# Patient Record
Sex: FEMALE | Race: OTHER | HISPANIC OR LATINO | Employment: UNEMPLOYED | URBAN - METROPOLITAN AREA
[De-identification: names, ages, dates, MRNs, and addresses within clinical notes are randomized per-mention and may not be internally consistent; named-entity substitution may affect disease eponyms.]

---

## 2017-03-29 ENCOUNTER — ALLSCRIPTS OFFICE VISIT (OUTPATIENT)
Dept: OTHER | Facility: OTHER | Age: 22
End: 2017-03-29

## 2017-03-29 DIAGNOSIS — N91.2 AMENORRHEA: ICD-10-CM

## 2017-03-29 LAB — HCG, QUALITATIVE (HISTORICAL): NEGATIVE

## 2017-09-06 ENCOUNTER — ALLSCRIPTS OFFICE VISIT (OUTPATIENT)
Dept: OTHER | Facility: OTHER | Age: 22
End: 2017-09-06

## 2017-09-06 DIAGNOSIS — R10.12 LEFT UPPER QUADRANT PAIN: ICD-10-CM

## 2017-09-06 DIAGNOSIS — R42 DIZZINESS AND GIDDINESS: ICD-10-CM

## 2017-09-22 ENCOUNTER — APPOINTMENT (OUTPATIENT)
Dept: LAB | Facility: CLINIC | Age: 22
End: 2017-09-22

## 2017-09-22 DIAGNOSIS — R42 DIZZINESS AND GIDDINESS: ICD-10-CM

## 2017-09-22 DIAGNOSIS — R10.12 LEFT UPPER QUADRANT PAIN: ICD-10-CM

## 2017-09-22 LAB
ALBUMIN SERPL BCP-MCNC: 3.8 G/DL (ref 3.5–5)
ALP SERPL-CCNC: 104 U/L (ref 46–116)
ALT SERPL W P-5'-P-CCNC: 21 U/L (ref 12–78)
ANION GAP SERPL CALCULATED.3IONS-SCNC: 6 MMOL/L (ref 4–13)
AST SERPL W P-5'-P-CCNC: 17 U/L (ref 5–45)
BILIRUB SERPL-MCNC: 0.42 MG/DL (ref 0.2–1)
BUN SERPL-MCNC: 12 MG/DL (ref 5–25)
CALCIUM SERPL-MCNC: 8.9 MG/DL (ref 8.3–10.1)
CHLORIDE SERPL-SCNC: 105 MMOL/L (ref 100–108)
CO2 SERPL-SCNC: 28 MMOL/L (ref 21–32)
CREAT SERPL-MCNC: 0.63 MG/DL (ref 0.6–1.3)
ERYTHROCYTE [DISTWIDTH] IN BLOOD BY AUTOMATED COUNT: 13.7 % (ref 11.6–15.1)
GFR SERPL CREATININE-BSD FRML MDRD: 129 ML/MIN/1.73SQ M
GLUCOSE P FAST SERPL-MCNC: 81 MG/DL (ref 65–99)
HCT VFR BLD AUTO: 37.1 % (ref 34.8–46.1)
HGB BLD-MCNC: 12.8 G/DL (ref 11.5–15.4)
LIPASE SERPL-CCNC: 124 U/L (ref 73–393)
MCH RBC QN AUTO: 28.4 PG (ref 26.8–34.3)
MCHC RBC AUTO-ENTMCNC: 34.5 G/DL (ref 31.4–37.4)
MCV RBC AUTO: 82 FL (ref 82–98)
PLATELET # BLD AUTO: 381 THOUSANDS/UL (ref 149–390)
PMV BLD AUTO: 9.5 FL (ref 8.9–12.7)
POTASSIUM SERPL-SCNC: 4.1 MMOL/L (ref 3.5–5.3)
PROT SERPL-MCNC: 8 G/DL (ref 6.4–8.2)
RBC # BLD AUTO: 4.51 MILLION/UL (ref 3.81–5.12)
SODIUM SERPL-SCNC: 139 MMOL/L (ref 136–145)
WBC # BLD AUTO: 7.87 THOUSAND/UL (ref 4.31–10.16)

## 2017-09-22 PROCEDURE — 36415 COLL VENOUS BLD VENIPUNCTURE: CPT

## 2017-09-22 PROCEDURE — 85027 COMPLETE CBC AUTOMATED: CPT

## 2017-09-22 PROCEDURE — 80053 COMPREHEN METABOLIC PANEL: CPT

## 2017-09-22 PROCEDURE — 83690 ASSAY OF LIPASE: CPT

## 2017-09-25 ENCOUNTER — GENERIC CONVERSION - ENCOUNTER (OUTPATIENT)
Dept: OTHER | Facility: OTHER | Age: 22
End: 2017-09-25

## 2018-01-13 VITALS
DIASTOLIC BLOOD PRESSURE: 62 MMHG | WEIGHT: 108.47 LBS | HEIGHT: 55 IN | HEART RATE: 58 BPM | SYSTOLIC BLOOD PRESSURE: 82 MMHG | TEMPERATURE: 98.2 F | BODY MASS INDEX: 25.1 KG/M2

## 2018-01-13 VITALS
BODY MASS INDEX: 22.05 KG/M2 | SYSTOLIC BLOOD PRESSURE: 104 MMHG | HEIGHT: 61 IN | DIASTOLIC BLOOD PRESSURE: 70 MMHG | WEIGHT: 116.8 LBS

## 2018-01-13 NOTE — MISCELLANEOUS
Reason For Visit  Reason For Visit Free Text Note Form: SW returned call to pt via  ID # [id number] re concern she has over medical cost  SW has explained the [address]   pt is interested in same  SW has referred pt to [facility] our Financial Counselor to f/u and assist pt with same  Active Problems    1  Abdominal pain, acute, left upper quadrant (789 02,338 19) (R10 12)   2  Decreased vision (369 9) (H54 7)   3  Dizziness (780 4) (R42)    Current Meds   1  No Reported Medications Recorded    Allergies    1  No Known Drug Allergies    2  No Known Environmental Allergies   3   No Known Food Allergies    Signatures   Electronically signed by : Madyson Jaeger LCSW; Sep 28 2017  5:26PM EST                       (Author)

## 2018-06-30 ENCOUNTER — APPOINTMENT (EMERGENCY)
Dept: CT IMAGING | Facility: HOSPITAL | Age: 23
End: 2018-06-30

## 2018-06-30 ENCOUNTER — HOSPITAL ENCOUNTER (EMERGENCY)
Facility: HOSPITAL | Age: 23
Discharge: HOME | End: 2018-06-30
Attending: EMERGENCY MEDICINE | Admitting: EMERGENCY MEDICINE

## 2018-06-30 VITALS
DIASTOLIC BLOOD PRESSURE: 81 MMHG | WEIGHT: 108.47 LBS | SYSTOLIC BLOOD PRESSURE: 136 MMHG | BODY MASS INDEX: 25.21 KG/M2 | TEMPERATURE: 98.9 F | RESPIRATION RATE: 18 BRPM | HEART RATE: 59 BPM | OXYGEN SATURATION: 97 %

## 2018-06-30 DIAGNOSIS — N94.6 DYSMENORRHEA: Primary | ICD-10-CM

## 2018-06-30 LAB
ALBUMIN SERPL BCP-MCNC: 4.1 G/DL (ref 3.5–5)
ALP SERPL-CCNC: 93 U/L (ref 46–116)
ALT SERPL W P-5'-P-CCNC: 24 U/L (ref 12–78)
ANION GAP SERPL CALCULATED.3IONS-SCNC: 9 MMOL/L (ref 4–13)
AST SERPL W P-5'-P-CCNC: 18 U/L (ref 5–45)
BACTERIA UR QL AUTO: ABNORMAL /HPF
BASOPHILS # BLD AUTO: 0.08 THOUSANDS/ΜL (ref 0–0.1)
BASOPHILS NFR BLD AUTO: 1 % (ref 0–1)
BILIRUB SERPL-MCNC: 0.4 MG/DL (ref 0.2–1)
BILIRUB UR QL STRIP: NEGATIVE
BUN SERPL-MCNC: 13 MG/DL (ref 5–25)
CALCIUM SERPL-MCNC: 9 MG/DL (ref 8.3–10.1)
CHLORIDE SERPL-SCNC: 101 MMOL/L (ref 100–108)
CLARITY UR: CLEAR
CO2 SERPL-SCNC: 28 MMOL/L (ref 21–32)
COLOR UR: YELLOW
CREAT SERPL-MCNC: 0.74 MG/DL (ref 0.6–1.3)
EOSINOPHIL # BLD AUTO: 0.12 THOUSAND/ΜL (ref 0–0.61)
EOSINOPHIL NFR BLD AUTO: 1 % (ref 0–6)
ERYTHROCYTE [DISTWIDTH] IN BLOOD BY AUTOMATED COUNT: 13 % (ref 11.6–15.1)
EXT PREG TEST URINE: NEGATIVE
GFR SERPL CREATININE-BSD FRML MDRD: 115 ML/MIN/1.73SQ M
GLUCOSE SERPL-MCNC: 88 MG/DL (ref 65–140)
GLUCOSE UR STRIP-MCNC: NEGATIVE MG/DL
HCT VFR BLD AUTO: 38.2 % (ref 34.8–46.1)
HGB BLD-MCNC: 13 G/DL (ref 11.5–15.4)
HGB UR QL STRIP.AUTO: NEGATIVE
KETONES UR STRIP-MCNC: ABNORMAL MG/DL
LEUKOCYTE ESTERASE UR QL STRIP: ABNORMAL
LIPASE SERPL-CCNC: 76 U/L (ref 73–393)
LYMPHOCYTES # BLD AUTO: 4.51 THOUSANDS/ΜL (ref 0.6–4.47)
LYMPHOCYTES NFR BLD AUTO: 38 % (ref 14–44)
MCH RBC QN AUTO: 28.4 PG (ref 26.8–34.3)
MCHC RBC AUTO-ENTMCNC: 34 G/DL (ref 31.4–37.4)
MCV RBC AUTO: 83 FL (ref 82–98)
MONOCYTES # BLD AUTO: 0.61 THOUSAND/ΜL (ref 0.17–1.22)
MONOCYTES NFR BLD AUTO: 5 % (ref 4–12)
MUCOUS THREADS UR QL AUTO: ABNORMAL
NEUTROPHILS # BLD AUTO: 6.44 THOUSANDS/ΜL (ref 1.85–7.62)
NEUTS SEG NFR BLD AUTO: 55 % (ref 43–75)
NITRITE UR QL STRIP: NEGATIVE
NON-SQ EPI CELLS URNS QL MICRO: ABNORMAL /HPF
PH UR STRIP.AUTO: 6 [PH] (ref 4.5–8)
PLATELET # BLD AUTO: 360 THOUSANDS/UL (ref 149–390)
PMV BLD AUTO: 8.8 FL (ref 8.9–12.7)
POTASSIUM SERPL-SCNC: 3.6 MMOL/L (ref 3.5–5.3)
PROT SERPL-MCNC: 8.4 G/DL (ref 6.4–8.2)
PROT UR STRIP-MCNC: ABNORMAL MG/DL
RBC # BLD AUTO: 4.58 MILLION/UL (ref 3.81–5.12)
RBC #/AREA URNS AUTO: ABNORMAL /HPF
SODIUM SERPL-SCNC: 138 MMOL/L (ref 136–145)
SP GR UR STRIP.AUTO: >=1.03 (ref 1–1.03)
UROBILINOGEN UR QL STRIP.AUTO: 0.2 E.U./DL
WBC # BLD AUTO: 11.76 THOUSAND/UL (ref 4.31–10.16)
WBC #/AREA URNS AUTO: ABNORMAL /HPF

## 2018-06-30 PROCEDURE — 83690 ASSAY OF LIPASE: CPT | Performed by: PHYSICIAN ASSISTANT

## 2018-06-30 PROCEDURE — 81001 URINALYSIS AUTO W/SCOPE: CPT

## 2018-06-30 PROCEDURE — 85025 COMPLETE CBC W/AUTO DIFF WBC: CPT | Performed by: PHYSICIAN ASSISTANT

## 2018-06-30 PROCEDURE — 36415 COLL VENOUS BLD VENIPUNCTURE: CPT | Performed by: PHYSICIAN ASSISTANT

## 2018-06-30 PROCEDURE — 99284 EMERGENCY DEPT VISIT MOD MDM: CPT

## 2018-06-30 PROCEDURE — 80053 COMPREHEN METABOLIC PANEL: CPT | Performed by: PHYSICIAN ASSISTANT

## 2018-06-30 PROCEDURE — 74177 CT ABD & PELVIS W/CONTRAST: CPT

## 2018-06-30 PROCEDURE — 81025 URINE PREGNANCY TEST: CPT | Performed by: PHYSICIAN ASSISTANT

## 2018-06-30 PROCEDURE — 96374 THER/PROPH/DIAG INJ IV PUSH: CPT

## 2018-06-30 RX ORDER — ONDANSETRON 4 MG/1
4 TABLET, ORALLY DISINTEGRATING ORAL EVERY 8 HOURS PRN
Qty: 15 TABLET | Refills: 0 | Status: SHIPPED | OUTPATIENT
Start: 2018-06-30 | End: 2018-10-12 | Stop reason: ALTCHOICE

## 2018-06-30 RX ORDER — IBUPROFEN 600 MG/1
600 TABLET ORAL EVERY 6 HOURS PRN
Qty: 30 TABLET | Refills: 0 | Status: SHIPPED | OUTPATIENT
Start: 2018-06-30 | End: 2018-10-12

## 2018-06-30 RX ORDER — KETOROLAC TROMETHAMINE 30 MG/ML
15 INJECTION, SOLUTION INTRAMUSCULAR; INTRAVENOUS ONCE
Status: COMPLETED | OUTPATIENT
Start: 2018-06-30 | End: 2018-06-30

## 2018-06-30 RX ADMIN — KETOROLAC TROMETHAMINE 15 MG: 30 INJECTION, SOLUTION INTRAMUSCULAR at 20:48

## 2018-06-30 RX ADMIN — IOHEXOL 100 ML: 350 INJECTION, SOLUTION INTRAVENOUS at 21:40

## 2018-07-01 NOTE — ED NOTES
Nursing student assessments and treatments reviewed and approved by myself       Marichuy Valera RN  07/01/18 5347

## 2018-07-01 NOTE — DISCHARGE INSTRUCTIONS
Dismenorrea   LO QUE NECESITA SABER:   Kristin Livers son cólicos menstruales dolorosos que ocurren al momento de marshall período menstrual o alrededor de éste  INSTRUCCIONES SOBRE EL [facility]:   Medicamentos:  Es posible que usted necesite alguno de los siguientes:  · AINEs (Analgésicos antiinflamatorios no esteroides)  ayudan a disminuir la inflamación, el dolor y la fiebre  Janet medicamento esta disponible con o sin arnold receta médica  Los AINEs pueden causar sangrado estomacal o problemas renales en ciertas personas  Si usted tomas un medicamento anticoagulante, siempre pregúntele a marshall médico si los MARELY son seguros para usted  Siempre dulce la etiqueta de janet medicamento y [place] instrucciones  · Los medicamentos anticonceptivos  podrían ayudar a disminuir marshall dolor  Estos medicamentos podrían ser píldoras anticonceptivas o un dispositivo intrauterino (DIU) que no sea de cobre  · [place] cyrus medicamentos aristeo se le haya indicado  Consulte con marshall médico si usted janet que marshall medicamento no le está ayudando o si presenta efectos secundarios  Infórmele si es alérgico a cualquier medicamento  Mantenga arnold lista actualizada de los [place], las vitaminas y los productos herbales que tomas  Incluya los siguientes datos de los medicamentos: cantidad, frecuencia y motivo de administración  Traiga con usted la lista o los envases de la píldoras a cyrus citas de seguimiento  Lleve la lista de los medicamentos con usted en flakita de arnold emergencia  Consuma alimentos bajos en grasa:  Aumente la cantidad de vegetales y semillas crudas que consume  Pregunte a marshall médico si usted debería teri vitamina B o suplementos de magnesio  Estos le ayudarán a disminuir marshall dolor  No consuma productos lácteos o huevos  Aplique calor:  Aplique calor sobre la parte inferior de marshall abdomen por 20 a 30 minutos cada 2 horas por los [employer] indiquen  El calor ayuda a disminuir el dolor y los espasmos musculares    Controle marshall estrés: El estrés puede empeorar colette síntomas  Intente realizar ejercicios de relajamiento, aristeo respiración profunda  Gemini Ar[place] regularmente:  Pregunte a hurt médico acerca del mejor plan de ejercicio para usted  El ejercicio puede disminuir el dolor  Mantenga un registro de hurt dolor:  Escriba cuándo comienzan y terminan el dolor y los períodos [place]  Sukumar Papa registro con usted a colette citas de seguimiento  No fume:  Evite estar con otras personas que fumen  Si usted fuma, nunca es demasiado tarde para dejar de hacerlo  El fumar puede aumentar hurt riesgo de dismenorrea  Solicite información a hurt médico si usted necesita ayuda para dejar de fumar  Programe arnold tiarra con hurt médico o ginecólogo aristeo se le indique:  Anote colette preguntas para que se acuerde de hacerlas delores colette visitas  Comuníquese con hurt médico o tocoginecólogo si:   · Usted tiene ansiedad o se siente deprimido  · Colette períodos son antes o después de tiempo o más dolorosos que de costumbre  · Usted tiene preguntas o inquietudes acerca de hurt condición o cuidado  Regrese a la kiah de emergencias si:   · Usted tiene dolor intenso  · Usted tiene sangrado vaginal abundante y siente que se desmaya  · Usted tiene dolor en el pecho o dificultad para respirar de manera repentina  © 2017 [address] Information is for End User's use only and may not be sold, redistributed or otherwise used for commercial purposes  All illustrations and images included in CareNotes® are the copyrighted property of A D A M , Inc  or Rosas Stover  Esta información es sólo para uso en educación  Hurt intención no es darle un consejo médico sobre enfermedades o tratamientos  Colsulte con hurt Parshall Ken[place] farmacéutico antes de seguir cualquier régimen médico para saber si es seguro y efectivo para usted

## 2018-07-01 NOTE — ED PROVIDER NOTES
History  Chief Complaint   Patient presents with    Abdominal Pain     intermittent abdominal pain x 6 months that has become more frequents  reports it as lower mid abdominal pain  also reports HA and dizziness  [facility]  80-year-old female presents to the emergency department with complaints of lower abdominal cramping pain  States she has had symptoms over the past 2 days  Notes that she has had intermittent symptoms over the past several months as well  States that symptoms seem to be worse when she gets her menstrual cycle  She started the mentions cycle approximately 2 days ago  No fevers or chills  Does complain of some nausea vomiting  No diarrhea  States the bleeding has not been any heavier than usual         History provided by:  Patient and friend   used: No    Abdominal Pain   Pain location:  Suprapubic, RLQ and LLQ  Pain quality: cramping    Pain radiates to:  Does not radiate  Pain severity:  Moderate  Onset quality:  Gradual  Duration:  2 days  Timing:  Intermittent  Progression:  Waxing and waning  Chronicity:  New  Relieved by:  Nothing  Ineffective treatments:  None tried  Associated symptoms: nausea, vaginal bleeding and vomiting    Associated symptoms: no chest pain, no chills, no cough, no dysuria, no fever, no hematuria, no shortness of breath and no sore throat        None       History reviewed  No pertinent past medical history  History reviewed  No pertinent surgical history  History reviewed  No pertinent family history  I have reviewed and agree with the history as documented  Social History   Substance Use Topics    Smoking status: Never Smoker    Smokeless tobacco: Never Used    Alcohol use No        Review of Systems   Constitutional: Negative for activity change, chills and fever  HENT: Negative for congestion, ear pain and sore throat  Eyes: Negative for pain     Respiratory: Negative for cough, chest tightness, shortness of breath and wheezing  Cardiovascular: Negative for chest pain  Gastrointestinal: Positive for abdominal pain, nausea and vomiting  Endocrine: Negative for cold intolerance and heat intolerance  Genitourinary: Positive for vaginal bleeding  Negative for difficulty urinating, dysuria, flank pain, hematuria and urgency  Musculoskeletal: Negative for back pain and myalgias  Skin: Negative for color change and rash  Allergic/Immunologic: Negative for food allergies  Neurological: Negative for dizziness, syncope, weakness, numbness and headaches  Psychiatric/Behavioral: Negative for agitation and behavioral problems  All other systems reviewed and are negative  Physical Exam  Physical Exam   Constitutional: She is oriented to person, place, and time  She appears well-developed and well-nourished  No distress  HENT:   Head: Normocephalic and atraumatic  Right Ear: External ear normal    Left Ear: External ear normal    Mouth/Throat: Oropharynx is clear and moist  No oropharyngeal exudate  Eyes: Conjunctivae are normal    Neck: No JVD present  No tracheal deviation present  Cardiovascular: Normal rate, regular rhythm and normal heart sounds  Exam reveals no gallop and no friction rub  No murmur heard  Pulmonary/Chest: Effort normal and breath sounds normal  No respiratory distress  She has no wheezes  She has no rales  She exhibits no tenderness  Abdominal: Soft  Bowel sounds are normal  She exhibits no distension  There is tenderness in the right lower quadrant, suprapubic area and left lower quadrant  There is no guarding and no CVA tenderness  Musculoskeletal: Normal range of motion  She exhibits no edema, tenderness or deformity  Lymphadenopathy:     She has no cervical adenopathy  Neurological: She is alert and oriented to person, place, and time  Skin: Skin is warm and dry  No rash noted  She is not diaphoretic  No erythema     Psychiatric: She has a normal mood and affect  Her behavior is normal    Nursing note and vitals reviewed        Vital Signs  ED Triage Vitals [06/30/18 1921]   Temperature Pulse Respirations Blood Pressure SpO2   98 9 °F (37 2 °C) 59 18 136/81 97 %      Temp Source Heart Rate Source Patient Position - Orthostatic VS BP Location FiO2 (%)   Oral Monitor Sitting Right arm --      Pain Score       Worst Possible Pain           Vitals:    06/30/18 1921   BP: 136/81   Pulse: 59   Patient Position - Orthostatic VS: Sitting       Visual Acuity      ED Medications  Medications   ketorolac (TORADOL) injection 15 mg (15 mg Intravenous Given 6/30/18 2048)   iohexol (OMNIPAQUE) 350 MG/ML injection (MULTI-DOSE) 100 mL (100 mL Intravenous Given 6/30/18 2140)       Diagnostic Studies  Results Reviewed     Procedure Component Value Units Date/Time    Urine Microscopic [[id number]]  (Abnormal) Collected:  06/30/18 2038    Lab Status:  Final result Specimen:  Urine Updated:  06/30/18 2219     RBC, UA 0-1 (A) /hpf      WBC, UA 1-2 (A) /hpf      Epithelial Cells Occasional /hpf      Bacteria, UA Occasional /hpf      MUCOUS THREADS Moderate (A)    Lipase [[id number]]  (Normal) Collected:  06/30/18 2029    Lab Status:  Final result Specimen:  Blood from Arm, Right Updated:  06/30/18 2104     Lipase 76 u/L     Comprehensive metabolic panel [[id number]]  (Abnormal) Collected:  06/30/18 2029    Lab Status:  Final result Specimen:  Blood from Arm, Right Updated:  06/30/18 2054     Sodium 138 mmol/L      Potassium 3 6 mmol/L      Chloride 101 mmol/L      CO2 28 mmol/L      Anion Gap 9 mmol/L      BUN 13 mg/dL      Creatinine 0 74 mg/dL      Glucose 88 mg/dL      Calcium 9 0 mg/dL      AST 18 U/L      ALT 24 U/L      Alkaline Phosphatase 93 U/L      Total Protein 8 4 (H) g/dL      Albumin 4 1 g/dL      Total Bilirubin 0 40 mg/dL      eGFR 115 ml/min/1 73sq m     Narrative:         National Kidney Disease Education Program recommendations are as follows:  GFR calculation is accurate only with a steady state creatinine  Chronic Kidney disease less than 60 ml/min/1 73 sq  meters  Kidney failure less than 15 ml/min/1 73 sq  meters      CBC and differential [[id number]]  (Abnormal) Collected:  06/30/18 2029    Lab Status:  Final result Specimen:  Blood from Arm, Right Updated:  06/30/18 2037     WBC 11 76 (H) Thousand/uL      RBC 4 58 Million/uL      Hemoglobin 13 0 g/dL      Hematocrit 38 2 %      MCV 83 fL      MCH 28 4 pg      MCHC 34 0 g/dL      RDW 13 0 %      MPV 8 8 (L) fL      Platelets 314 Thousands/uL      Neutrophils Relative 55 %      Lymphocytes Relative 38 %      Monocytes Relative 5 %      Eosinophils Relative 1 %      Basophils Relative 1 %      Neutrophils Absolute 6 44 Thousands/µL      Lymphocytes Absolute 4 51 (H) Thousands/µL      Monocytes Absolute 0 61 Thousand/µL      Eosinophils Absolute 0 12 Thousand/µL      Basophils Absolute 0 08 Thousands/µL     POCT pregnancy, urine [[id number]]  (Normal) Resulted:  06/30/18 2032    Lab Status:  Final result Updated:  06/30/18 2032     EXT PREG TEST UR (Ref: Negative) negative    ED Urine Macroscopic [[id number]]  (Abnormal) Collected:  06/30/18 2038    Lab Status:  Final result Specimen:  Urine Updated:  06/30/18 2032     Color, UA Yellow     Clarity, UA Clear     pH, UA 6 0     Leukocytes, UA Trace (A)     Nitrite, UA Negative     Protein, UA Trace (A) mg/dl      Glucose, UA Negative mg/dl      Ketones, UA 15 (1+) (A) mg/dl      Urobilinogen, UA 0 2 E U /dl      Bilirubin, UA Negative     Blood, UA Negative     Specific Gravity, UA >=1 030    Narrative:       CLINITEK RESULT                 CT abdomen pelvis with contrast   Final Result by Darwin Truong MD (06/30 2202)         Unremarkable CT of the abdomen and pelvis with IV without oral contrast          Workstation performed: [id number]                    Procedures  Procedures       Phone Contacts  ED Phone Contact    ED Course                               MDM  Number of Diagnoses or Management Options  Dysmenorrhea:   Diagnosis management comments: Differential diagnosis includes but not limited to:  Dysmenorrhea, ovarian cyst, fibroid  Amount and/or Complexity of Data Reviewed  Clinical lab tests: ordered and reviewed  Tests in the radiology section of CPT®: ordered and reviewed      CritCare Time    Disposition  Final diagnoses:   Dysmenorrhea     Time reflects when diagnosis was documented in both MDM as applicable and the Disposition within this note     Time User Action Codes Description Comment    6/30/2018 10:19 PM Chris Davidson Add [N94 6] Dysmenorrhea       ED Disposition     ED Disposition Condition Comment    Discharge  [facility] discharge to home/self care  Condition at discharge: Stable        Follow-up Information     Follow up With Specialties Details Why [id number] Faria martina, DO Internal Medicine Schedule an appointment as soon as possible for a visit  [address]ina  [phone]            Discharge Medication List as of 6/30/2018 10:20 PM      START taking these medications    Details   ibuprofen (MOTRIN) 600 mg tablet Take 1 tablet (600 mg total) by mouth every 6 (six) hours as needed for mild pain, Starting Sat 6/30/2018, Normal      ondansetron (ZOFRAN ODT) 4 mg disintegrating tablet Take 1 tablet (4 mg total) by mouth every 8 (eight) hours as needed for nausea or vomiting for up to 15 doses, Starting Sat 6/30/2018, Normal           No discharge procedures on file      ED Provider  Electronically Signed by           Daylin Parker PA-C  07/01/18 6725

## 2018-07-01 NOTE — ED NOTES
Nursing student assessments and treatments reviewed and approved by myself        Mendel Cox, RN  06/30/18 2035

## 2018-10-12 ENCOUNTER — OFFICE VISIT (OUTPATIENT)
Dept: OBGYN CLINIC | Facility: CLINIC | Age: 23
End: 2018-10-12

## 2018-10-12 VITALS
BODY MASS INDEX: 25.33 KG/M2 | HEART RATE: 69 BPM | DIASTOLIC BLOOD PRESSURE: 72 MMHG | WEIGHT: 112.6 LBS | SYSTOLIC BLOOD PRESSURE: 111 MMHG | HEIGHT: 56 IN

## 2018-10-12 DIAGNOSIS — Z01.419 WOMEN'S ANNUAL ROUTINE GYNECOLOGICAL EXAMINATION: ICD-10-CM

## 2018-10-12 DIAGNOSIS — N91.2 AMENORRHEA: Primary | ICD-10-CM

## 2018-10-12 LAB — SL AMB POCT URINE HCG: POSITIVE

## 2018-10-12 PROCEDURE — 81025 URINE PREGNANCY TEST: CPT | Performed by: OBSTETRICS & GYNECOLOGY

## 2018-10-12 PROCEDURE — 99213 OFFICE O/P EST LOW 20 MIN: CPT | Performed by: OBSTETRICS & GYNECOLOGY

## 2018-10-12 NOTE — PROGRESS NOTES
Assessment/Plan:  Fantasma Walls is a 21 y o  female , here for evaluation of amenorrhea  LMP Late 2018, POCT urine preg test positive in office today  Viability U/S showed intrauterine sac ~1 5cm, fetus not visualized  Return to clinic in two weeks for:   -Repeat TVUS  -PAP   -GC/Chlamydia  -Flu      E[place]ian interpretor Veronica Dill: [id number]   Subjective   Fantasma Walls is a 21 y o  female  (has one daughter in [place] born via  at full term, no complications)  She is here for evaluation of amenorrhea  Last MP was "2018  Patient sexually active with boyfriend and does not use contraception  She denies any history of STD's  Is asymptomatic now, denies fevers, chills, vaginal discharge, vaginal bleeding, abdominal/pelvic pain  Does report occasional nausea, but able to tolerate PO well  Patient Active Problem List   Diagnosis    Amenorrhea     Gynecologic History  LMP "2018  Contraception: None    Past Medical History:   Diagnosis Date    Patient denies medical problems      Past Surgical History:   Procedure Laterality Date    NO PAST SURGERIES       Family History   Problem Relation Age of Onset    No Known Problems Mother     No Known Problems Father     No Known Problems Brother     No Known Problems Daughter     No Known Problems Brother     No Known Problems Brother      Social History     Social History    Marital status: /Civil Union     Spouse name: N/A    Number of children: N/A    Years of education: N/A     Occupational History    Not on file       Social History Main Topics    Smoking status: Never Smoker    Smokeless tobacco: Never Used    Alcohol use Yes      Comment: socially    Drug use: No    Sexual activity: Yes     Partners: Male     Birth control/ protection: None     Other Topics Concern    Not on file     Social History Narrative    No narrative on file     Patient has no known allergies  No current outpatient prescriptions on file  Review of Systems  See HPI  Denies fevers/chills, nausea, vomiting, abnormal vaginal bleeding, dysuria, urinary frequency, discharge, pelvic pain, diarrhea  Objective   /72   Pulse 69   Ht 4' 8" (1 422 m)   Wt 51 1 kg (112 lb 9 6 oz)   LMP 06/01/2018   Breastfeeding? No   BMI 25 24 kg/m²   GEN: The patient was alert and oriented x3, pleasant well-appearing female in no acute distress  RESP:  Normal respirations  ABD:  Soft, nontender, non-distended  PELVIC:  Normal appearing external female genitalia  See TVUS findings above

## 2018-10-17 ENCOUNTER — PATIENT OUTREACH (OUTPATIENT)
Dept: OBGYN CLINIC | Facility: HOSPITAL | Age: 23
End: 2018-10-17

## 2018-10-17 DIAGNOSIS — Z78.9 NEED FOR FOLLOW-UP BY SOCIAL WORKER: Primary | ICD-10-CM

## 2018-10-17 NOTE — PROGRESS NOTES
PC to [facility] from [facility]  Apparently pt had called Tarun Yo, Financial Counselor to ask about termination of pregnancy and to report that she was having abdominal pain  Bird He, unable to reach [facility] and not aware that pt lives in [place], called Aric Ivan for assistance  Pt with c/o severe abdominal pain  Asking for information about termination of pregnancy  SW questioned stage of pregnancy and whether pt had scheduled [facility] appointment  Pt is Guamanian-speaking only  Onelia called pt back and provided [facility] with requested information  [facility] initially told Onelia to have pt go to ED and told her that referral to [facility] for TOP at this point may be ill-advised  [facility] again recommended that Onelia  (or Bird He) advise pt to go to ED  [facility] phoned Aric Ivan again -- she had told Bird He to have pt go to ED if pain is severe  She then told me that Bird He had provided pt with name of [facility] for TOP  Pt told Bird He that she didn't think she could get to ED today and "might" go tomorrow  Pt does have appointment with [facility] in [place] next week and hopefully can be followed at that time

## 2019-03-26 ENCOUNTER — HOSPITAL ENCOUNTER (EMERGENCY)
Facility: HOSPITAL | Age: 24
Discharge: HOME | End: 2019-03-26
Attending: EMERGENCY MEDICINE | Admitting: EMERGENCY MEDICINE

## 2019-03-26 VITALS
HEART RATE: 65 BPM | SYSTOLIC BLOOD PRESSURE: 119 MMHG | BODY MASS INDEX: 28.07 KG/M2 | WEIGHT: 125.22 LBS | OXYGEN SATURATION: 99 % | RESPIRATION RATE: 18 BRPM | DIASTOLIC BLOOD PRESSURE: 82 MMHG | TEMPERATURE: 98.4 F

## 2019-03-26 DIAGNOSIS — A60.00 GENITAL HERPES: ICD-10-CM

## 2019-03-26 DIAGNOSIS — N39.0 UTI (URINARY TRACT INFECTION): Primary | ICD-10-CM

## 2019-03-26 LAB
AMORPH PHOS CRY URNS QL MICRO: ABNORMAL /HPF
BACTERIA UR QL AUTO: ABNORMAL /HPF
BILIRUB UR QL STRIP: NEGATIVE
CLARITY UR: ABNORMAL
COLOR UR: YELLOW
EXT PREG TEST URINE: NEGATIVE
GLUCOSE UR STRIP-MCNC: NEGATIVE MG/DL
HGB UR QL STRIP.AUTO: ABNORMAL
KETONES UR STRIP-MCNC: NEGATIVE MG/DL
LEUKOCYTE ESTERASE UR QL STRIP: ABNORMAL
NITRITE UR QL STRIP: NEGATIVE
NON-SQ EPI CELLS URNS QL MICRO: ABNORMAL /HPF
PH UR STRIP.AUTO: 7.5 [PH]
PROT UR STRIP-MCNC: NEGATIVE MG/DL
RBC #/AREA URNS AUTO: ABNORMAL /HPF
SP GR UR STRIP.AUTO: 1.01 (ref 1–1.03)
UROBILINOGEN UR QL STRIP.AUTO: 0.2 E.U./DL
WBC #/AREA URNS AUTO: ABNORMAL /HPF

## 2019-03-26 PROCEDURE — 81001 URINALYSIS AUTO W/SCOPE: CPT | Performed by: EMERGENCY MEDICINE

## 2019-03-26 PROCEDURE — 96372 THER/PROPH/DIAG INJ SC/IM: CPT

## 2019-03-26 PROCEDURE — 99283 EMERGENCY DEPT VISIT LOW MDM: CPT

## 2019-03-26 PROCEDURE — 81025 URINE PREGNANCY TEST: CPT | Performed by: EMERGENCY MEDICINE

## 2019-03-26 PROCEDURE — 87491 CHLMYD TRACH DNA AMP PROBE: CPT | Performed by: EMERGENCY MEDICINE

## 2019-03-26 PROCEDURE — 87591 N.GONORRHOEAE DNA AMP PROB: CPT | Performed by: EMERGENCY MEDICINE

## 2019-03-26 RX ORDER — METRONIDAZOLE 500 MG/1
2000 TABLET ORAL ONCE
Status: COMPLETED | OUTPATIENT
Start: 2019-03-26 | End: 2019-03-26

## 2019-03-26 RX ORDER — AZITHROMYCIN 250 MG/1
1000 TABLET, FILM COATED ORAL ONCE
Status: COMPLETED | OUTPATIENT
Start: 2019-03-26 | End: 2019-03-26

## 2019-03-26 RX ORDER — ACYCLOVIR 400 MG/1
400 TABLET ORAL 3 TIMES DAILY
Qty: 30 TABLET | Refills: 0 | Status: SHIPPED | OUTPATIENT
Start: 2019-03-26 | End: 2021-07-22 | Stop reason: ALTCHOICE

## 2019-03-26 RX ORDER — ACYCLOVIR 200 MG/1
400 CAPSULE ORAL ONCE
Status: COMPLETED | OUTPATIENT
Start: 2019-03-26 | End: 2019-03-26

## 2019-03-26 RX ORDER — CEPHALEXIN 250 MG/1
500 CAPSULE ORAL ONCE
Status: COMPLETED | OUTPATIENT
Start: 2019-03-26 | End: 2019-03-26

## 2019-03-26 RX ORDER — CEPHALEXIN 250 MG/1
500 CAPSULE ORAL 2 TIMES DAILY
Qty: 28 CAPSULE | Refills: 0 | Status: SHIPPED | OUTPATIENT
Start: 2019-03-26 | End: 2019-04-02

## 2019-03-26 RX ADMIN — METRONIDAZOLE 2000 MG: 500 TABLET, FILM COATED ORAL at 22:02

## 2019-03-26 RX ADMIN — ACYCLOVIR 400 MG: 200 CAPSULE ORAL at 22:03

## 2019-03-26 RX ADMIN — AZITHROMYCIN 1000 MG: 250 TABLET, FILM COATED ORAL at 22:03

## 2019-03-26 RX ADMIN — CEPHALEXIN 500 MG: 250 CAPSULE ORAL at 23:01

## 2019-03-26 RX ADMIN — LIDOCAINE HYDROCHLORIDE 250 MG: 10 INJECTION, SOLUTION EPIDURAL; INFILTRATION; INTRACAUDAL; PERINEURAL at 22:04

## 2019-03-27 LAB
C TRACH DNA SPEC QL NAA+PROBE: NEGATIVE
N GONORRHOEA DNA SPEC QL NAA+PROBE: NEGATIVE

## 2019-03-27 NOTE — ED PROVIDER NOTES
History  Chief Complaint   Patient presents with    Vaginal Pain     pt c o vaginal pain, discharge, and burning with urination  HPI     77-year-old previously healthy female presenting for evaluation of vaginal pain, white vaginal discharge, and burning with urination for the last 5 days  Also endorses some mild suprapubic abdominal pain  States that she noticed a rash on her labia 3 days ago  She has never had anything like this before  No prior history of STIs  She is sexually active with her  only, intermittently uses protection  She has had 1 prior pregnancy that ended in miscarriage last year  Last menstrual period was 1 week ago, described as normal   No history of prior gyn conditions  Denies nausea, vomiting, abdominal pain, fevers, or chills  No blood in her urine  None       Past Medical History:   Diagnosis Date    Patient denies medical problems        Past Surgical History:   Procedure Laterality Date    NO PAST SURGERIES         Family History   Problem Relation Age of Onset    No Known Problems Mother     No Known Problems Father     No Known Problems Brother     No Known Problems Daughter     No Known Problems Brother     No Known Problems Brother      I have reviewed and agree with the history as documented  Social History     Tobacco Use    Smoking status: Never Smoker    Smokeless tobacco: Never Used   Substance Use Topics    Alcohol use: Yes     Comment: socially    Drug use: No        Review of Systems   Constitutional: Negative for chills and fever  HENT: Negative for congestion  Eyes: Negative for visual disturbance  Respiratory: Negative for cough and shortness of breath  Cardiovascular: Negative for chest pain and leg swelling  Gastrointestinal: Positive for abdominal pain (suprapubic)  Negative for constipation, diarrhea, nausea and vomiting  Genitourinary: Positive for dysuria, vaginal discharge and vaginal pain   Negative for difficulty urinating, frequency and menstrual problem  Musculoskeletal: Negative for arthralgias, back pain, neck pain and neck stiffness  Skin: Negative for rash  Neurological: Negative for weakness, numbness and headaches  Psychiatric/Behavioral: Negative for agitation, behavioral problems and confusion  Physical Exam  Physical Exam   Constitutional: She is oriented to person, place, and time  She appears well-developed and well-nourished  No distress  HENT:   Head: Normocephalic and atraumatic  Right Ear: External ear normal    Left Ear: External ear normal    Nose: Nose normal    Mouth/Throat: Oropharynx is clear and moist    Eyes: Conjunctivae are normal    Neck: Normal range of motion  Neck supple  Cardiovascular: Normal rate, regular rhythm and normal heart sounds  Exam reveals no gallop and no friction rub  No murmur heard  Pulmonary/Chest: Effort normal and breath sounds normal  No respiratory distress  She has no wheezes  She has no rales  Abdominal: Soft  Bowel sounds are normal  She exhibits no distension  There is no tenderness  There is no guarding  Genitourinary: Uterus normal  There is rash (vesicular herpetic lesions) on the right labia  There is rash (vesicular herpetic lesions) on the left labia  Uterus is not enlarged and not tender  Cervix exhibits no motion tenderness, no discharge and no friability  Right adnexum displays no mass, no tenderness and no fullness  Left adnexum displays no mass, no tenderness and no fullness  There is bleeding (scant dark red blood) in the vagina  Musculoskeletal: Normal range of motion  She exhibits no edema or deformity  Neurological: She is alert and oriented to person, place, and time  She exhibits normal muscle tone  Skin: Skin is warm and dry  She is not diaphoretic         Vital Signs  ED Triage Vitals   Temperature Pulse Respirations Blood Pressure SpO2   03/26/19 2029 03/26/19 2029 03/26/19 2029 03/26/19 2029 03/26/19 2029 98 4 °F (36 9 °C) 58 20 133/78 98 %      Temp Source Heart Rate Source Patient Position - Orthostatic VS BP Location FiO2 (%)   03/26/19 2029 03/26/19 2029 03/26/19 2302 03/26/19 2302 --   Oral Monitor Sitting Right arm       Pain Score       03/26/19 2029       Worst Possible Pain           Vitals:    03/26/19 2029 03/26/19 2302   BP: 133/78 119/82   Pulse: 58 65   Patient Position - Orthostatic VS:  Sitting         Visual Acuity      ED Medications  Medications   metroNIDAZOLE (FLAGYL) tablet 2,000 mg (2,000 mg Oral Given 3/26/19 2202)   cefTRIAXone (ROCEPHIN) 250 mg in lidocaine (PF) (XYLOCAINE-MPF) 1 % IM only syringe (250 mg Intramuscular Given 3/26/19 2204)   azithromycin (ZITHROMAX) tablet 1,000 mg (1,000 mg Oral Given 3/26/19 2203)   acyclovir (ZOVIRAX) capsule 400 mg (400 mg Oral Given 3/26/19 2203)   cephalexin (KEFLEX) capsule 500 mg (500 mg Oral Given 3/26/19 2301)       Diagnostic Studies  Results Reviewed     Procedure Component Value Units Date/Time    Urinalysis with microscopic [[id number]]  (Abnormal) Collected:  03/26/19 2148    Lab Status:  Final result Specimen:  Urine, Other Updated:  03/26/19 2219     Clarity, UA Cloudy     Color, UA Yellow     Specific Gravity, UA 1 015     pH, UA 7 5     Glucose, UA Negative mg/dl      Ketones, UA Negative mg/dl      Blood, UA Moderate     Protein, UA Negative mg/dl      Nitrite, UA Negative     Bilirubin, UA Negative     Urobilinogen, UA 0 2 E U /dl      Leukocytes, UA Small     WBC, UA 4-10 /hpf      RBC, UA 4-10 /hpf      Bacteria, UA Innumerable /hpf      AMORPH PHOSPATES Moderate /hpf      Epithelial Cells Occasional /hpf     POCT pregnancy, urine [[id number]]  (Normal) Resulted:  03/26/19 2147    Lab Status:  Final result Updated:  03/26/19 2200     EXT PREG TEST UR (Ref: Negative) Negative    Chlamydia/GC amplified DNA by PCR [[id number]] Collected:  03/26/19 2148    Lab Status:   In process Specimen:  Urine, Other Updated:  03/26/19 2152 No orders to display              Procedures  Procedures       Phone Contacts  ED Phone Contact    ED Course                               MDM  Number of Diagnoses or Management Options  Genital herpes:   UTI (urinary tract infection): new and requires workup  Diagnosis management comments: Generally well appearing  Nontoxic  Afebrile and hemodynamically stable  Patient on pelvic exam has some scant dark red blood in the vaginal vault consistent with a light menstrual period  No visible discharge  No cervical motion tenderness or adnexal tenderness  Abdominal exam benign  She has herpetic lesions visible to the labia majora and labia minora  Cerocom Yakut  used to discuss my concern for general herpes with the patient  Will test for GC chlamydia, but also empirically treat for gonorrhea, chlamydia, and Trichomonas  Will also start on 10 day course of acyclovir  Patient has seen at the [facility] in [place] with her prior pregnancy  Discussed that she will need maintenance therapy going forward, and recommending that she follow up with [facility] in [place] to accomplish this  I have also told her that she may want to consider testing for syphilis and HIV as well as hepatitis  We discussed that her sexual partners including her  should be tested, she should refrain from sexual intercourse during an active outbreak, and use condoms with all sexual activity  No evidence of PID or systemic infection  Patient discharged in good condition         Amount and/or Complexity of Data Reviewed  Clinical lab tests: ordered and reviewed    Patient Progress  Patient progress: stable           Disposition  Final diagnoses:   UTI (urinary tract infection)   Genital herpes     Time reflects when diagnosis was documented in both MDM as applicable and the Disposition within this note     Time User Action Codes Description Comment    3/26/2019 10:51 PM Hellen Blanton Add [N39 0] UTI (urinary tract infection)     3/26/2019 10:52 PM Dacia Rishabh Add [A60 00] Genital herpes       ED Disposition     ED Disposition Condition Date/Time Comment    Discharge Stable Tue Mar 26, 2019 10:51 PM Fany Pascual discharge to home/self care  Follow-up Information     Follow up With Specialties Details Why Contact Info    Carla Villafuerte MD Reproductive Endocrinology and Infertility, Obstetrics and Gynecology, Obstetrics, Gynecology In 1 week For further management of your genital herpes  [address] [facility]  [phone]            Discharge Medication List as of 3/26/2019 10:56 PM      START taking these medications    Details   acyclovir (ZOVIRAX) 400 MG tablet Take 1 tablet (400 mg total) by mouth 3 (three) times a day for 10 days, Starting Tue 3/26/2019, Until Fri 4/5/2019, Print      cephalexin (KEFLEX) 250 mg capsule Take 2 capsules (500 mg total) by mouth 2 (two) times a day for 7 days, Starting Tue 3/26/2019, Until Tue 4/2/2019, Print           No discharge procedures on file      ED Provider  Electronically Signed by           Jeremías Pino MD  03/27/19 0425

## 2021-02-24 ENCOUNTER — TELEPHONE (OUTPATIENT)
Dept: OBGYN CLINIC | Facility: CLINIC | Age: 26
End: 2021-02-24

## 2021-02-25 ENCOUNTER — OFFICE VISIT (OUTPATIENT)
Dept: OBGYN CLINIC | Facility: CLINIC | Age: 26
End: 2021-02-25

## 2021-02-25 VITALS
DIASTOLIC BLOOD PRESSURE: 74 MMHG | BODY MASS INDEX: 25.56 KG/M2 | WEIGHT: 114 LBS | SYSTOLIC BLOOD PRESSURE: 115 MMHG | HEART RATE: 56 BPM

## 2021-02-25 DIAGNOSIS — Z34.90 EARLY STAGE OF PREGNANCY: Primary | ICD-10-CM

## 2021-02-25 PROCEDURE — 99213 OFFICE O/P EST LOW 20 MIN: CPT | Performed by: OBSTETRICS & GYNECOLOGY

## 2021-02-25 RX ORDER — FAMOTIDINE 20 MG
1 TABLET ORAL DAILY
Qty: 30 EACH | Refills: 10 | Status: SHIPPED | OUTPATIENT
Start: 2021-02-25 | End: 2021-08-23 | Stop reason: HOSPADM

## 2021-02-25 RX ORDER — PYRIDOXINE HCL (VITAMIN B6) 25 MG
25 TABLET ORAL DAILY
Qty: 30 TABLET | Refills: 3 | Status: SHIPPED | OUTPATIENT
Start: 2021-02-25 | End: 2021-08-23 | Stop reason: HOSPADM

## 2021-02-25 NOTE — PROGRESS NOTES
Assessment  22 y o  Kayode Jacobsen at Unknown gestational age presenting for amenorrhea  Pregnancy confirmed  MALIA 10/13/21  Plan  Problem List Items Addressed This Visit     Amenorrhea       Visit Diagnoses     Early stage of pregnancy    -  Primary    Relevant Medications    Prenatal Vit-Fe Fumarate-FA (Prenatal Complete) 14-0 4 MG TABS    doxylamine (UNISON) 25 MG tablet    pyridoxine (B-6) 25 MG tablet        - Prenatal vitamin  - Prenatal panel (slips given today)  - Prenatal Nursing Intake in 2 weeks  - Prenatal H&P in 4 weeks   - B6 & Unisom prescribed for nausea     ____________________________________________________________      Subjective   Fany Bonilla is a 22 y o  [id number] with an unknown LMP who presents for amenorrhea  She notes she took a home pregnancy test and it was positive  She is having nausea and vomiting  Patient notes that this pregnancy was unplanned  She was not using contraception at the time  She reports she is uncertain of her LMP and that she has regular menses  She has has no vaginal bleeding since her LMP  Patient's prior pregnancies were uncomplicated        Objective  /74 (BP Location: Left arm, Patient Position: Sitting, Cuff Size: Adult)   Pulse 56   Wt 51 7 kg (114 lb)   BMI 25 56 kg/m²     Physical Exam:  Physical Exam    Transvaginal Pelvic Ultrasound  Lea IUP  CRL 1 07, consistent with EGA 7w1d   +yolk sac  +cardiac activity    No adnexal masses appreciated

## 2021-03-04 ENCOUNTER — TELEPHONE (OUTPATIENT)
Dept: OBGYN CLINIC | Facility: CLINIC | Age: 26
End: 2021-03-04

## 2021-03-05 ENCOUNTER — INITIAL PRENATAL (OUTPATIENT)
Dept: OBGYN CLINIC | Facility: CLINIC | Age: 26
End: 2021-03-05

## 2021-03-05 VITALS
HEIGHT: 58 IN | DIASTOLIC BLOOD PRESSURE: 61 MMHG | WEIGHT: 115.4 LBS | HEART RATE: 72 BPM | SYSTOLIC BLOOD PRESSURE: 104 MMHG | BODY MASS INDEX: 24.22 KG/M2

## 2021-03-05 DIAGNOSIS — Z23 NEED FOR INFLUENZA VACCINATION: ICD-10-CM

## 2021-03-05 DIAGNOSIS — Z3A.08 8 WEEKS GESTATION OF PREGNANCY: Primary | ICD-10-CM

## 2021-03-05 PROCEDURE — 90686 IIV4 VACC NO PRSV 0.5 ML IM: CPT

## 2021-03-05 PROCEDURE — 90471 IMMUNIZATION ADMIN: CPT

## 2021-03-05 PROCEDURE — 99211 OFF/OP EST MAY X REQ PHY/QHP: CPT

## 2021-03-05 NOTE — LETTER
Dentist Letter    Fany Thomason  1995  [address] [address]          03/05/21    We have had several requests from local dentist requesting permission to perform procedures on our patients who are pregnant  We wish to respond with this letter regarding some of the more routine procedures that we have been asked about  The following procedures may be performed on our obstetric patients:   1  Administration of local anesthesia   2  Administration of antibiotics such as PCN, Ampicillin, and Erythromycin  3  Administration of pain medications such as Tylenol, Tylenol with codeine, and if needed Percocet  4  Shielded X-rays    Should you have any questions, please do not hesitate to contact at [phone]          Sincerely,    [facility]   [phone]

## 2021-03-05 NOTE — PATIENT INSTRUCTIONS
[place] 1923 ¡Felicitaciones por Minnie Cook! Nos complace que nos haya elegido para ser hurt proveedor de cyrus servicios de izabela médica delores hurt Sameul Andrew  Hurt izabela, la izabela de hurt hijo por nacer (niños) y hurt shira son importante para nosotros  Ahora, más que [place], hurt izabela será lo más importante para usted  El crecimiento y el progreso de hurt bebé pueden verse afectados por la forma en que usted se cuide  Es [facility] buena idea planificar con anticipación  Es un hecho conocido que las mujeres que reciben atención mèdica desde temprano en  [place] y delores todo el embarazo tienen bebés más saludables  Se programarán visitas para usted delores todo Qian Pratt  Es hurt deber cumplir con cyrus citas y seguir las instrucciones para hurt cuidado  El [place] de visitas será decidido por hurt médico  No tengas miedo de hacer preguntas  Hable con cyrus enfermeras y proveedores sobre cyrus planes de parto y cualquier inquietud que pueda tener  Aaron Nahid de Verificación  ; Medicina Materno Fetal (MFM) al [phone] para programar hurt ruma   o Ruma de 1 hora, solo se permite 1 persona de apoyo, NO niños    ; Análisis de moses: complete antes de hurt ruma de H&P   NO tiene que ayunar para ningún análisis de moses a menos que se lo indiquen  - CBC, Tipo de [place] y [address] [place] de anticuerpos, Análisis de Josef Ellis de glucosa al red, VIH, sífilis, hepatitis B    ; Pj Obrien y física: ruma de H&P   examen físico   Examen pélvico: Aurther Mariner y Clamidia   Si es necesario: Papanicolaou    Plan:  ; Visitas prenatales de rutina cada 4 semanas hasta las 28 semanas   En cyrus visitas prenatales:  - Monitoreo de los el latidos del corazón del bebé y medir hurt guanako en crecimiento, Recolecte arnold Radha Deshpande, y se tomara hurt peso y presión arterial      Señales de [place] en el embarazo: Malick Starks  [phone]    1  Sangrado vaginal  2   Dolor abdominal keara que no desaparece  3  Fiebre (más de 100 4 y no se shell con Tylenol)  4  Vómitos persistentes que calderon más de 24 horas  5  dolor de pecho  6  Dolor o ardor al orinar  7  Dolor de jigna severo que no se resuelve con Tylenol  8  Visión borrosa o neelam puntos en marshall visión  9  Hinchazón repentina de marshall omer o andrés  10  Enrojecimiento, hinchazón o dolor en arnold pierna  11  Un aumento de peso repentino en pocos días  12  Contar los movimientos fetales del bebé  (después de 28 semanas o el sexto mes de embarazo)  15  Arnold pérdida de líquido acuoso de la vagina: puede ser un chorro, un goteo o arnold humedad continua  14  Después de 20 semanas de embarazo, calambres rítmicos (más de 4 por hora) o menstruales aristeo dolor bajo / pélvico      Manténgase saludable delores marshall embarazo:   · Consuma alimentos saludables y variados  Alimentos saludables incluyen frutas, verduras, panes de wanda integral, alimentos lácteos bajos en grasa, frijoles, donnie magras y pescado  [place] líquidos aristeo se le haya indicado  Pregunte cuánto líquido debe teri cada día y cuáles líquidos son los más adecuados para usted  o Cafeína: no está sharan cómo la cafeína afecta el embarazo  Limite marshall consumo de cafeína para evitar posibles problemas de izabela   - Limite la cafeína a menos de 200 miligramos por día  - La cafeína se puede encontrar en el café, el té, la cola, las bebidas deportivas y el chocolate  o  Alimentos que contienen ryne: el ryne se encuentra naturalmente en viet todos los tipos de pescados y mariscos  Algunos tipos de peces absorben niveles más altos de ryne que pueden ser dañinos para un bebé ashlee  Coma solo pescado y mariscos bajos en ryne  - Limite marshall consumo de pescado a 2 porciones por semana    - Elija pescado con bajo contenido de ryne, aristeo atún sharan enlatado, camarones, cangrejo, salmón, bacalao o tilapia   o No coma pescado con alto contenido de [employer] pez alyson, el pez Smith peraza, la caballa real y el tiburón  - Cada semana, puede comer hasta 12 onzas de pescado o mariscos que tienen bajos niveles de [employer]  Estos incluyen camarones, atún sharan enlatado, salmón, abadejo y [place]  o Coma solo 6 onzas de atún carrion (carrion) por semana  El atún carrion tiene más ryne que el atún [place]  · [address]  Marshall necesidad de ciertas vitaminas y [facility], aristeo el ácido fólico, aumenta delores el [facility]  Las vitaminas prenatales proporcionan algunas de las vitaminas y minerales adicionales que usted necesita  Las vitaminas prenatales también podrían ayudar a disminuir el riesgo de ciertos defectos de nacimiento  · Pregunte cuánto peso usted debe aumentar delores marshall embarazo  Demasiado aumento de peso o muy poco puede ser poco saludable para usted y marshall bebé  · Ejercicio: hable con marshall proveedor de [employer] ejercicio  El ejercicio moderado puede ayudarlo a mantenerse en forma  Marshall proveedor de [employer] ayudará a planificar un programa de ejercicios que sea seguro para usted delores el [facility]  · Brianna muchos líquidos mientras hace ejercicio para mantenerse hidratado  Tenga cuidado para evitar el sobrecalentamiento  o EVITE los ejercicios que pueden hacer que pierda el equilibrio   o Actividades que pueden poner a marshall bebé en riesgo, es decir, montar a marisa, bucear, esquiar o hacer snowboard  o Después de marshall primer trimestre, evite los ejercicios que requieren que se acueste boca arriba   o EVITE exceder arnold frecuencia cardíaca mayor de 140 latidos por minuto  Siempre que pueda mantener arnold conversación mientras hace ejercicio, es probable que marshall ritmo cardíaco sea aceptable   ; Siempre use el cinturón de seguridad   El cinturón de hombro debe estar sobre el pecho (entre los senos) y lejos del ky     Asegure el cinturón de regazo debajo de marshall vientre para que se ajuste cómodamente en cyrus caderas y Alex pélvico   ; Realizar el ejercicio de [place]   Imagínese deteniendo el flujo de orina, contrayendo los músculos de marshall piso pélvico  Mantenga patric contracción delores 10 segundos y suelte   Se pueden repetir 10-20 veces por día  · No fume  Si usted fuma, nunca es demasiado tarde para dejar de hacerlo  Fumar aumenta el riesgo de aborto espontáneo y otros problemas de izabela delores marshall Jilda Daily  Fumar puede causar que marshall bebé nazca antes de tiempo o que pese menos al nacer  Solicite información a marshall médico si usted necesita ayuda para dejar de fumar  · No consuma alcohol  El alcohol pasa de marshall cuerpo al bebé a través de la placenta  Puede afectar el desarrollo del cerebro de marshall bebé y provocar el síndrome de alcoholismo fetal (SAF)  SAF es un nils de condiciones que causan [address], de comportamiento y de crecimiento  · Consulte con marshall médico antes de teri cualquier medicamento  Muchos medicamentos pueden perjudicar a marshall bebé si usted los tomas [address]  No tome ningún medicamento, vitaminas, hierbas o suplementos sin kina consultar con marshall médico    · Nunca  use drogas ilegales o de la sandhu (aristeo marihuana o cocaína) mientras está embarazada  Consejos de seguridad:   · Evite jacuzzis y saunas  No use un jacuzzi o un sauna mientras usted está embarazada, especialmente delores el primer trimestre  Los [facility] [place] y los saunas aumentan la temperatura de marshall bebé y el riesgo de defectos de nacimiento  · Evite la toxoplasmosis  [place] es arnold infección causada por comer carne cruda o estar cerca del excremento de un soraya infectado  [place] puede causar malformaciones congénitas, aborto espontáneo y Sanjeev Schein  Lávese las andrés después de tocar carne cruda  Asegúrese de que la carne esté gema cocida antes de comerla  Evite los huevos crudos y la Lonni Hoist  Use guantes o pida que alguien la ayude a limpiar la caja de arena del soraya mientras amanda [place]     · Consulte con marshall médico acerca de viajar  El [address] cómodo para viajar es delores el sara trimestre  Pregunte a marshall médico si usted puede viajar después de las 36 semanas  Es posible que no pueda viajar en avión después de las [address]  También le puede recomendar que evite largos viajes por carretera  Programe un control con marshall médico u obstetra según lo indicado:  Vaya a todas colette citas prenatales delores marshall embarazo  Anote colette preguntas para que se acuerde de hacerlas delores colette visitas  [place] y vómito en el embarazo       CUIDADO AMBULATORIO:   La náusea y el vómito en el embarazo  pueden suceder a cualquier hora del día  Estos síntomas usualmente comienzan antes de la semana 9 del embarazo y terminan para la semana 14 (sara trimestre)  Algunas mujeres pueden tener náusea o vómito por un tiempo prolongado  Estos síntomas pueden afectar a algunas mujeres delores el embarazo  La náusea y el vómito no dañan a marshall bebé  Estos síntomas pueden dificultarle colette actividades diarias  Pregúntele a marshall Babita Slate vitaminas y minerales son adecuados para usted  · Usted vomita más de 4 veces en 1 día  · Usted no ha podido retener líquidos en el estómago por más de 1 día  · Usted pierde más de 2 libras  · Usted tiene fiebre  · Colette náuseas y vómito continúan por más de 14 semanas  · Usted tiene preguntas o inquietudes acerca de marshall condición o cuidado  El tratamiento  para la náusea y el vómito en el embarazo generalmente no es necesario  Usted puede [employer] alimentos que come y en colette actividades para ayudar a controlar colette síntomas  Es posible que usted necesite probar varias cosas para determinar qué funciona mejor para usted  Hable con marshall médico si colette síntomas no mejoran con los cambios que se recomiendan a continuación  Es posible que usted necesite vitamina B6 y medicamento si estos cambios no ayudan o si colette síntomas se vuelven graves     Cambios de nutrición que usted puede realizar para controlar la náusea y el vómito:   · Coma porciones pequeñas delores el día en vez de 3 comidas con porciones grandes  Es más probable que usted tenga náusea y vómito cuando marshall estómago está vacío  Consuma alimentos bajos en grasa y ricos en proteínas  Ejemplos son Maurie Earing, frijoles, pavo y moy sin keyshawn Mayo, aristeo galletas saladas, cereal seco o un sandwich chico antes de [place]  · Coma galletas saladas o pan carlos a antes de levantarse de marshall cama por la mañana  Levántese de la cama lentamente  Los movimientos repentinos podrían provocarle mareos y [place]  · Consuma alimentos blandos cuando se sienta con náuseas  Ejemplos de alimentos blandos son el pan carlos a, cereal seco, pasta sin Daphene Borer y pan  Otros alimentos blandos incluyen a las [employer], plátanos, gelatina y pretzels  Evite los alimentos condimentados, grasosos y fritos  Evite otros alimentos que le provoquen náuseas  · [place] líquidos que contengan gengibre  [place] refresco de gengibre hecho con gengibre real o té de gengibre hecho con gengibre fresco rallado  Las [employer] o dulces de gengibre también podrían ayudar a aliviar la náusea y el vómito  · [place] líquidos entre alimentos en vez de tomarlos con los alimentos  [place]spe[place] al menos 30 minutos después de comer para teri líquidos  [place] cantidades pequeñas de líquidos con frecuencia delores el día para evitar la deshidratación  Consulte cuál es la cantidad de líquido que usted debería consumir al día  Otros cambios que usted puede realizar para controlar la náusea y el vómito:   · Evite los olores que la [place]  Los olores travis podrían provocar que [employer] náuseas y el vómito, o podrían empeorarlo  Camine un poco, prenda un ventilador o trate de dormir con la ventana abierta para respirar aire fresco  Cuando esté cocinando, mukesh las ventanas para eliminar el olor que podría provocarle náuseas    · No se cepille cyrus dientes inmediatamente después de comer  si eso le provoca náuseas  · Descanse cuando lo necesite  Comience arnold actividad lentamente y vuelva a marshall rutina normal conforme se empiece a sentir mejor  · Hable con marshall médico acerca de las vitaminas prenatales  Las vitaminas prenatales pueden provocar náuseas a algunas mujeres  Trate de tomárselas por la noche o con un bocadillo  Si hemalatha cambio no le [place], marshall médico podría recomendarle un tipo de vitamina diferente  · No use ningún medicamento, vitamina o suplemento para controlar cyrus síntomas sin antes consultarlo con marshall médico   Varios medicamentos pueden dañar a marshall bebé que no ha nacido  · El ejercicio de ligero a moderado  podría ayudar a aliviar cyrus síntomas  También podría ayudarla a dormir mejor por la noche  Pregunte a marshall médico acerca del mejor plan de ejercicio para usted  Beneficios de la lactancia materna   son menos propensos a desarrollar alergias   Tienen arnold mayor protección contra la meningitis bacteriana   Tienen menos infecciones del oído medio   Tienen menos dificultades de aprendizaje    Tienen menos dificultades de comportamiento   Tienen un coeficiente intelectual más alto   Tienen tasas más bajas de enfermedades respiratorias   Tienen stanley obesidad    Son menos propensos a desarrollar diabetes juvenil y algunos cánceres infantiles   Tienen menos probabilidades de morir por Síndrome de erte [facility]   Un bebé más saludable significa menos visitas al médico y a la farmacia   La lactancia materna es ecológica  No hay nada que tirar   La lactancia materna es gratis; La fórmula puede costar más de $ 1000 delores el primer año de katherine   Hay menos sangrado vaginal inmediatamente después del parto y un retorno más rápido a marshall tamaño anterior al Rick Seip  Las [place] que amamantan delores un total de 2 años tienen  ;  Arnold disminución del 40% en el riesgo de cáncer de seno  ; Disminución del riesgo de cáncer de ovario   ; Tasas más bajas de osteoporosis, diabetes y enfermedades del corazón  Importancia de la lactancia materna exclusiva   Al proporcionar el alimento ideal para el crecimiento y desarrollo saludable de los bebés, solo se les alimenta con [place], esto es amamantamiento exclusivo   La lactancia materna [facility] primeros 6 meses es muy importante para mejorar la izabela de un bebé y reducir las enfermedades infantiles  Lactancia materna exclusiva delores los primeros 6 meses  [facility] Estadounid[facility] de Pediatría recomienda la lactancia materna exclusiva delores los primeros seis meses y la lactancia materna continua con suplementos de sólidos delores los próximos 6 meses  Inicio temprano de la lactancia materna   Las mujeres que alimentan a cyrus bebés dentro de la primera hora de nacimiento se conocen aristeo inicio temprano de la lactancia materna y aseguran que el recién nacido reciba calostro   El calostro es rico en anticuerpos y nutrientes esenciales  Compartiendo la habitación   Puede estabilizar la respiración y la frecuencia cardíaca del recién nacido   Reduce el llanto   Mejorar la capacidad del recién nacido para mantener la temperatura y los niveles de azúcar en la moses   Estimulación temprana del sistema inmunitario del bebé   efectos calmantes   Enlace más fácil y rápido   El compartir la habitación promueve conocer a marshall recién nacido   El alojamiento conjunto facilita la lactancia materna   Las mujeres que se alojan con cyrus recién nacidos producen [place] y producen un buen suministro de [place]   Se hace más fácil reconocer las señales de alimentación del bebé   Los bebés tienen sesiones de lactancia más largas   Las mujeres que comparten habitación con cyrus recién nacidos tienen más probabilidades de amamantar exclusivamente en comparación con las mujeres que están separadas de cyrus recién nacidos    Piel con piel   El contacto piel con piel debe ocurrir independientemente de la preferencia de alimentación de arnold radha o el modo de [place]   Después del parto de marshall bebé, es importante que arnold madre freddy y marshall bebé tengan un contacto ininterrumpido de piel a piel   El contacto piel con piel debe ocurrir inmediatamente después del nacimiento delores al menos arnold o dos horas y [place] después de la primera alimentación para las madres que [place]   El contacto piel con piel significa colocar recién nacidos secos y sin ropa sobre el pecho desnudo de marshall [place], con mantas calientes cubriendo la espalda del bebé   Todos los procedimientos de rutina, aristeo las evaluaciones de [place] y recién nacidos, pueden realizarse delores el contacto piel con piel o pueden retrasarse hasta después del período sensible inmediatamente después del nacimiento   Estamos orgullosos de ofrecer amplios servicios educativos y de apoyo para alentar a las madres a amamantar   Janet servicio ofrece información, educación y [de-identified] para mujeres que use amamantar  Las [place] pueden dejar un mensaje o arnold pregunta sobre la lactancia en línea en cualquier momento del día  Las enfermeras responderán dentro de las 72 horas   La línea de respuesta de lactancia materna es [phone] ([phone])  NO deje mensajes urgentes o emergentes en esta línea de mensaje  Comuníquese con marshall médico Petty Pong si tiene alguna pregunta o inquietud urgente   En flakita de sospecha de arnold afección médica de emergencia, [address] [facility]      Attaching your baby at the Breast (English): [email or web address] [email or web address]-[email or web address]-your-baby-[email or web address]-the-[email or web address]    Attaching your baby at the Breast (Senegalese):  [email or web address] [email or web address]-items/[email or web address]      Coronavirus (COVID-19) pregnancy FAQs: Medical experts answer your questions  From [employer] com cy  [id number]  bc (courtesy of [facility])  As of 3/18/20  By Asha Montoya 39  Medically reviewed by Society for Maternal-Fetal Medicine       We asked parents-to-be to send us their most pressing questions about coronavirus (COVID-19)  Among them: Is it still safe to deliver in a hospital? What if my ob-gyn has the virus? We sent those questions to the top docs at the Society for Maternal-Fetal Medicine  Here are their answers  The coronavirus (COVID-19) pandemic has been declared a national emergency in the [facility] by [employer]  Moms-to-be like you are concerned about everything from getting medicines to managing disruptions at work  But above and beyond any worry about lifestyle changes is a focus on your health and the impact of COVID-19 on your pregnancy  We asked obstetrics doctors who handle the most complicated pregnancy issues to answer your questions about the coronavirus  Here are their responses, provided by Dr Erin Belcher and her colleagues at the Society for [place] 250  Am I at more risk for COVID-19 if I'm pregnant? We don't know  Pregnancy does change your immune system in ways that might make you more susceptible to viral respiratory infections like COVID-19  If you become infected, you might also be at higher risk for more severe illness compared to the general population  Although this does not appear to be the case with COVID-19, based on limited data so far, a higher risk has been true for pregnant women with other coronavirus infections (SARS-CoV and MERS-CoV) and other viral respiratory infections, such as flu  It's important to take preventive actions to avoid infection, such as washing your hands often and avoiding people who are sick  How might coronavirus affect my pregnancy? Again, we don't know   Women with other coronavirus infections (such as SARS-CoV) did not have miscarriage or stillbirth at higher rates than the general population  We know that having other respiratory viral infections during pregnancy, such as flu, has been associated with problems like low birth weight and  birth  Also, having a high fever early in pregnancy may increase the risk of certain birth defects  Could I transmit coronavirus to my baby during pregnancy or delivery? We don't know whether you could transmit COVID-19 to your baby before or during delivery  However, among the few case studies of infants born to mothers with COVID-23 published in peer-reviewed literature, none of the infants tested positive for the virus  Also, there was no virus detected in samples of amniotic fluid or breast milk  There have been a few reports of newborns as young as a few days old with infection, suggesting that a mother can transmit the infection to her infant through close contact after delivery  There have been no reports of mother-to-baby transmission for other coronaviruses (MERS-CoV and SARS-CoV), although only limited information is available  Is it safe for me to deliver at a hospital where there have been COVID-19 cases? Yes  We know that COVID-19 is a very scary virus  The good news is that hospitals are taking great precautions to keep patients and healthcare providers safe  When a patient is even suspected to have COVID-19, they are placed in a negative pressure room  (Think of these rooms as vacuums that suck and filter the air so it's safe for the other people in the hospital)  This makes it possible for you to deliver at the hospital without putting you or your baby at risk  Hospitals are also implementing stricter visiting policies to keep patients safe  It's worth calling your hospital to check if there are any new regulations to be aware of      What plans should I make now in case the hospital system is overwhelmed when it's time for me to deliver? This is a great question to talk with your doctor or midwife about when you're 34 to 36 weeks pregnant  Every hospital is making different plans for dealing with this scenario  I work in healthcare  Should I ask my doctor to excuse me from work until the baby is born? What if I work in a school, the travel [employer], or some other high-risk setting? Healthcare facilities should take care to limit the exposure of pregnant employees to patients with confirmed or suspected COVID-19, just as they would with other infectious cases  If you continue working, be sure to follow the CDC's risk assessment and infection control guidelines  If you work in a school, travel [employer], or other high-risk setting, talk with your employer about what it's doing to protect employees and minimize infection risks  Wash your hands often  What if my OB gets COVID-19? If your doctor or midwife tests positive for COVID-19, they will need to be quarantined until they recover and are no longer at risk of transmitting the virus  In this case, you'll be assigned to another OB in your doctor's practice (or you may choose another practitioner yourself)  Ask your new OB or your doctor's office if you should self-quarantine or be tested for the virus  (It will depend on when you last saw your provider and when that person tested positive )    Should we hold off on trying to conceive because of COVID-19? At this time, there's no reason to hold off on trying to get pregnant, but the data we have is really limited  For example, we don't think the virus causes birth defects or increases your risk of miscarriage  But we don't know whether you could transmit COVID-19 to your baby before or during delivery  We also don't know if the virus lives in semen or can be sexually transmitted  We have a babymoon scheduled in the next few months - should we cancel?   If you're planning to travel internationally or on a cruise, you should strongly consider canceling  At this time, the virus has reached more than 140 countries, and there are travel bans to [place], most of [place], and [place]  Places where large numbers of people gather are at highest risk, especially airports and cruise ships  If you're planning travel in the [place] , note that any travel setting increases your risk of exposure, and there may be travel bans even in [place] by the time you're scheduled to go  Even if you're state is not blocked, you'll definitely want to avoid traveling to communities where the virus is spreading  To find out where these places are, check The [place] Times map based on CDC data  For the most current advice to help you avoid exposure, check the CDC's COVID-19 travel page  Will the hospital separate me from my  and keep the baby in quarantine? If you test positive for COVID-19 or have been exposed but have no symptoms, the CDC, [employer] of Obstetricians and Gynecologists, and the Society for [place] [employer] all recommend that you be  from your baby to decrease the risk of transmission to the baby  This would last until you are no longer at risk of transmitting the virus  If you do not have COVID-19 and have not been exposed to the virus, the hospital will not separate you from your   My hospital is restricting visitors and only allowing one support person  If my support person leaves after the delivery, will they be allowed to come back? Every hospital has different policies  Contact your hospital or labor and delivery unit a week or so before delivery to get the most up-to-date restrictions  In general, if your support person needs to leave, they would be allowed back unless they knew they were exposed to COVID-19 after leaving your company  [facility] understands that the coronavirus (COVID-19) pandemic is an evolving story and that your questions will change over time   We'll continue asking moms and dads in our Community what they want to know, and we'll get the answers from experts to keep them - and you - informed and supported  My mom was planning to fly here to help me care for my new baby after delivery  Should I tell her not to come? Yes  If your mom is over 61 or has any serious chronic medical conditions (such as heart disease, lung disease, or diabetes), she is at higher risk of serious illness from COVID-19 and should avoid air travel  And remember that any travel setting increases a person's risk of exposure  So, it may be risky to have her around the baby after she has been traveling  For the most current advice on traveling, check the [facility]'s COVID-19 travel page  [facility] understands that the coronavirus pandemic is an evolving story and that your questions will change over time  We'll continue asking moms and dads in our Community what they want to know, and we'll get the answers from experts to keep them - and you - informed and supported

## 2021-03-05 NOTE — LETTER
Proof of Pregnancy Letter    Fany Vermaun  1995  [address] [place] [address]        03/05/21      Fany Chang is a patient at our facility  Fany Loredo Estimated Date of Delivery: 10/13/2021  Any questions or concerns, please feel free to contact our office      Sincerely,    [facility]    [address]   [place], [address] [place] [address]   [phone]

## 2021-03-05 NOTE — PROGRESS NOTES
OB INTAKE INTERVIEW  Pt presents for OB intake via # [id number]    OB History    Para Term  AB Living   3 1 1     1   SAB TAB Ectopic Multiple Live Births           1      # Outcome Date GA Lbr Neal/2nd Weight Sex Delivery Anes PTL Lv   3 Current            2 Term 11 40w0d   F Vag-Spont None  KATH   1               Hx of  delivery prior to 36 weeks 6 days:  No   If yes, place a referral for cervical surveillance at 16 weeks  Last Menstrual Period:    No LMP recorded (lmp unknown)  Patient is pregnant  Ultrasound date: 2021  7 weeks 1 days     Estimated Date of Delivery: None noted  By US  H&P visit scheduled  3/12/2021 @ 1330       Last pap smear: unknown date      Current Issues:  Constipation :   No  Headaches :   No  Cramping:  No  Spotting :   No  PICA cravings :  No  FOB Involved:   Yes  Planned pregnancy:  Yes  I have these concerns about this prenatal patient:   Per pt history of Herpes - please discuss/assess at H&P  No vaccines on file - ordered Varicella titer  No records from previous deliveries in [place] - ordered hemoglobin electrophoresis  Please discuss and order quad screen at H&P       Interview education   [facility] Pregnancy Essentials reviewed and discussed   [facility] Handout   [facility] Handout   Discussed genetic testing   Prenatal lab work: Scripts printed and given to pt   Influenza vaccine given today: Yes   Discussed Tdap vaccine  Immunizations: There is no immunization history on file for this patient  Depression Screening Follow-up Plan: Patient's depression screening was negative with an [place] score of  1  Clinically patient does not have depression  No treatment is required     Nurse/Family Partnership- referral placed:  N/A   If yes, place referral for nurse family partnership  BMI Counseling: Body mass index is 24 54 kg/m²     Tobacco Cessation Counseling: non-smoker  Infection Screening: Does the pt have a hx of MRSA? No  If yes- please follow MRSA protocol and obtain a nasal swab for MRSA culture  The patient was oriented to our practice and all questions were answered    Interviewed by: Beatrice Powell RN 03/05/21

## 2021-03-05 NOTE — LETTER
Dentist Letter    Fany James  1995  [address] [address]          03/05/21    We have had several requests from local dentist requesting permission to perform procedures on our patients who are pregnant  We wish to respond with this letter regarding some of the more routine procedures that we have been asked about  The following procedures may be performed on our obstetric patients:   1  Administration of local anesthesia   2  Administration of antibiotics such as PCN, Ampicillin, and Erythromycin  3  Administration of pain medications such as Tylenol, Tylenol with codeine, and if needed Percocet  4  Shielded X-rays    Should you have any questions, please do not hesitate to contact at [phone]          Sincerely,    [facility]   [phone]

## 2021-03-05 NOTE — LETTER
[facility] Letter    Fany Loredo  1995  [address] [facility] [address]       03/05/21          Fany Miranda is a patient and under our care in our office  Primitivo Skiff Ajtun's Estimated Date of Delivery:10/13/2021  Any questions or concerns feel free to contact our office       Thank you,  [address]  [id number] [facility]/Mavis I[address]  [place]  [facility]  [facility]/[address]  [phone]

## 2021-03-09 ENCOUNTER — PATIENT OUTREACH (OUTPATIENT)
Dept: OBGYN CLINIC | Facility: CLINIC | Age: 26
End: 2021-03-09

## 2021-03-09 DIAGNOSIS — Z59.89 UNINSURED: Primary | ICD-10-CM

## 2021-03-09 SDOH — ECONOMIC STABILITY - INCOME SECURITY: OTHER PROBLEMS RELATED TO HOUSING AND ECONOMIC CIRCUMSTANCES: Z59.89

## 2021-03-09 NOTE — PROGRESS NOTES
ROSLYN received a referral in regard to a new pre douglas pt  I contacted pt using [employer] services,  # [id number]  BETTYCM explained role and asked if pt would complete prenatal psychosocial assessment via phone today, pt agreed  Pt reports this is her third pregnancy, she has two other children 6and 8years old that reside [place] with family  Pt currently resides with her brother, but is considering moving with her  who is FOB who lives in the same area  Pt and  FOB are both happy about the pregnancy  Pts mother reports no HX of MH conditions, and no depressions during this pregnancy  She reports feeling tired, but states her mood has been good  Pt is currently employed on the weekends cleaning houses  Pt walks to her doctors appointments  Pt reports she would like assistance with supplies for the baby  She will know more about what she needs as the birth gets closer  I spoke with her about [facility] and [facility] the [facility] [place] [facility] so she can schedule an intake  She is happy for the help with formula  I also referred pt to the [facility] counselors because pt is undocumented and uninsured and would like assistance for the  financial program and medicaid for the hospital    Pt is supported by her brother and FOB and describes the relationship with the FOB as a great relationship  Pt reports she does not smoke, drink alcohol, or use drugs and did not previous to pregnancy  Pt reports no domestic violence and no criminal HX  Pt will schedule with WIC and work with financial counselor in regard to insurance concerns  ROSLYN will continue to follow to assist pt

## 2021-03-12 ENCOUNTER — ROUTINE PRENATAL (OUTPATIENT)
Dept: OBGYN CLINIC | Facility: CLINIC | Age: 26
End: 2021-03-12

## 2021-03-12 VITALS
HEIGHT: 58 IN | DIASTOLIC BLOOD PRESSURE: 68 MMHG | SYSTOLIC BLOOD PRESSURE: 106 MMHG | HEART RATE: 71 BPM | WEIGHT: 113 LBS | BODY MASS INDEX: 23.72 KG/M2

## 2021-03-12 DIAGNOSIS — Z11.3 ROUTINE SCREENING FOR STI (SEXUALLY TRANSMITTED INFECTION): ICD-10-CM

## 2021-03-12 DIAGNOSIS — N89.8 VAGINAL DISCHARGE: ICD-10-CM

## 2021-03-12 DIAGNOSIS — O23.599 BACTERIAL VAGINOSIS IN PREGNANCY: ICD-10-CM

## 2021-03-12 DIAGNOSIS — Z3A.09 9 WEEKS GESTATION OF PREGNANCY: Primary | ICD-10-CM

## 2021-03-12 DIAGNOSIS — B96.89 BACTERIAL VAGINOSIS IN PREGNANCY: ICD-10-CM

## 2021-03-12 LAB
BV WHIFF TEST VAG QL: ABNORMAL
CLUE CELLS SPEC QL WET PREP: POSITIVE
PH SMN: 5 [PH]
SL AMB POCT WET MOUNT: POSITIVE
T VAGINALIS VAG QL WET PREP: NEGATIVE
YEAST VAG QL WET PREP: NEGATIVE

## 2021-03-12 PROCEDURE — 87491 CHLMYD TRACH DNA AMP PROBE: CPT | Performed by: FAMILY MEDICINE

## 2021-03-12 PROCEDURE — G0145 SCR C/V CYTO,THINLAYER,RESCR: HCPCS | Performed by: FAMILY MEDICINE

## 2021-03-12 PROCEDURE — 87591 N.GONORRHOEAE DNA AMP PROB: CPT | Performed by: FAMILY MEDICINE

## 2021-03-12 PROCEDURE — 99213 OFFICE O/P EST LOW 20 MIN: CPT | Performed by: OBSTETRICS & GYNECOLOGY

## 2021-03-12 PROCEDURE — 87210 SMEAR WET MOUNT SALINE/INK: CPT | Performed by: OBSTETRICS & GYNECOLOGY

## 2021-03-12 RX ORDER — METRONIDAZOLE 500 MG/1
500 TABLET ORAL EVERY 12 HOURS SCHEDULED
Qty: 14 TABLET | Refills: 0 | Status: SHIPPED | OUTPATIENT
Start: 2021-03-12 | End: 2021-03-19

## 2021-03-13 LAB
C TRACH DNA SPEC QL NAA+PROBE: NEGATIVE
N GONORRHOEA DNA SPEC QL NAA+PROBE: NEGATIVE

## 2021-03-18 ENCOUNTER — TELEPHONE (OUTPATIENT)
Dept: OBGYN CLINIC | Facility: CLINIC | Age: 26
End: 2021-03-18

## 2021-03-18 LAB
LAB AP GYN PRIMARY INTERPRETATION: NORMAL
Lab: NORMAL
PATH INTERP SPEC-IMP: NORMAL

## 2021-03-25 ENCOUNTER — APPOINTMENT (OUTPATIENT)
Dept: LAB | Facility: CLINIC | Age: 26
End: 2021-03-25

## 2021-03-25 DIAGNOSIS — Z3A.08 8 WEEKS GESTATION OF PREGNANCY: Primary | ICD-10-CM

## 2021-03-25 LAB
ABO GROUP BLD: NORMAL
BACTERIA UR QL AUTO: NORMAL /HPF
BASOPHILS # BLD AUTO: 0.05 THOUSANDS/ΜL (ref 0–0.1)
BASOPHILS NFR BLD AUTO: 0 % (ref 0–1)
BILIRUB UR QL STRIP: NEGATIVE
BLD GP AB SCN SERPL QL: NEGATIVE
CLARITY UR: CLEAR
COLOR UR: YELLOW
EOSINOPHIL # BLD AUTO: 0.12 THOUSAND/ΜL (ref 0–0.61)
EOSINOPHIL NFR BLD AUTO: 1 % (ref 0–6)
ERYTHROCYTE [DISTWIDTH] IN BLOOD BY AUTOMATED COUNT: 14.6 % (ref 11.6–15.1)
GLUCOSE UR STRIP-MCNC: NEGATIVE MG/DL
HBV SURFACE AG SER QL: NORMAL
HCT VFR BLD AUTO: 36.9 % (ref 34.8–46.1)
HGB BLD-MCNC: 12 G/DL (ref 11.5–15.4)
HGB UR QL STRIP.AUTO: NEGATIVE
IMM GRANULOCYTES # BLD AUTO: 0.13 THOUSAND/UL (ref 0–0.2)
IMM GRANULOCYTES NFR BLD AUTO: 1 % (ref 0–2)
KETONES UR STRIP-MCNC: NEGATIVE MG/DL
LEUKOCYTE ESTERASE UR QL STRIP: ABNORMAL
LYMPHOCYTES # BLD AUTO: 2.46 THOUSANDS/ΜL (ref 0.6–4.47)
LYMPHOCYTES NFR BLD AUTO: 22 % (ref 14–44)
MCH RBC QN AUTO: 28.4 PG (ref 26.8–34.3)
MCHC RBC AUTO-ENTMCNC: 32.5 G/DL (ref 31.4–37.4)
MCV RBC AUTO: 87 FL (ref 82–98)
MONOCYTES # BLD AUTO: 0.75 THOUSAND/ΜL (ref 0.17–1.22)
MONOCYTES NFR BLD AUTO: 7 % (ref 4–12)
NEUTROPHILS # BLD AUTO: 7.68 THOUSANDS/ΜL (ref 1.85–7.62)
NEUTS SEG NFR BLD AUTO: 69 % (ref 43–75)
NITRITE UR QL STRIP: NEGATIVE
NON-SQ EPI CELLS URNS QL MICRO: NORMAL /HPF
NRBC BLD AUTO-RTO: 0 /100 WBCS
PH UR STRIP.AUTO: 7 [PH]
PLATELET # BLD AUTO: 316 THOUSANDS/UL (ref 149–390)
PMV BLD AUTO: 8.9 FL (ref 8.9–12.7)
PROT UR STRIP-MCNC: NEGATIVE MG/DL
RBC # BLD AUTO: 4.23 MILLION/UL (ref 3.81–5.12)
RBC #/AREA URNS AUTO: NORMAL /HPF
RH BLD: POSITIVE
RUBV IGG SERPL IA-ACNC: 28.6 IU/ML
SP GR UR STRIP.AUTO: 1.01 (ref 1–1.03)
SPECIMEN EXPIRATION DATE: NORMAL
UROBILINOGEN UR QL STRIP.AUTO: 0.2 E.U./DL
WBC # BLD AUTO: 11.19 THOUSAND/UL (ref 4.31–10.16)
WBC #/AREA URNS AUTO: NORMAL /HPF

## 2021-03-25 PROCEDURE — 80081 OBSTETRIC PANEL INC HIV TSTG: CPT

## 2021-03-25 PROCEDURE — 86787 VARICELLA-ZOSTER ANTIBODY: CPT

## 2021-03-25 PROCEDURE — 83020 HEMOGLOBIN ELECTROPHORESIS: CPT

## 2021-03-25 PROCEDURE — 36415 COLL VENOUS BLD VENIPUNCTURE: CPT

## 2021-03-25 PROCEDURE — 87086 URINE CULTURE/COLONY COUNT: CPT

## 2021-03-25 PROCEDURE — 81001 URINALYSIS AUTO W/SCOPE: CPT

## 2021-03-26 LAB
BACTERIA UR CULT: NORMAL
HIV 1+2 AB+HIV1 P24 AG SERPL QL IA: NORMAL
RPR SER QL: NORMAL

## 2021-03-30 LAB
HGB A MFR BLD: 2.2 % (ref 1.8–3.2)
HGB A MFR BLD: 97.8 % (ref 96.4–98.8)
HGB F MFR BLD: 0 % (ref 0–2)
HGB FRACT BLD-IMP: NORMAL
HGB S MFR BLD: 0 %
VZV IGG SER IA-ACNC: NORMAL

## 2021-04-06 NOTE — PROGRESS NOTES
Fany Hernandez presents today for routine OB visit at [address] [facility] [address]  JP=191/67  Wt=50 8 kg (112 lb); Body mass index is 23 82 kg/m² ; TWG=Not found  Fetal Heart Rate: 152  Abdomen: soft, non-tender  She was treated for BV at last appointment but still has symptoms of odor and discharge  Wet mt/KOH  Numerous yeast hyphae,  Negative for BV, negative for trich    will treat with miconazole 2% cream 1 applicator in the vagina x7 nights  Review to please call if symptoms not resolve within 1 week,  Should see improvement in a few days  Denies vaginal bleeding or leaking of fluid  Pap/GC/CT  Negative for chlamydia gonorrhea,   Her Pap 03/12/2021 was negative but showed yeast and  Suggest BV   labs done 03/25/2021 her hemoglobin was 12 0, her blood type is O-positive, varicella immune ,  Normal hemoglobin electrophoresis  sequential screen is scheduled for today at [employer]  Reviewed common discomforts of pregnancy in first trimester and warning signs  Advised to continue medications and return in 4 weeks  Current Outpatient Medications   Medication Instructions    acyclovir (ZOVIRAX) 400 mg, Oral, 3 times daily    doxylamine (UNISON) 12 5 mg, Oral, Daily at bedtime PRN    miconazole (MONISTAT-7) 2 % vaginal cream 1 applicator, Vaginal, Daily at bedtime    Prenatal Vit-Fe Fumarate-FA (Prenatal Complete) 14-0 4 MG TABS 1 tablet, Oral, Daily    pyridoxine (B-6) 25 mg, Oral, Daily       Fany was seen today for routine prenatal visit      Diagnoses and all orders for this visit:    Vaginal yeast infection  -     miconazole (MONISTAT-7) 2 % vaginal cream; Insert 1 applicator into the vagina daily at bedtime for 7 days  -     POCT wet mount- numerous yeast hyphae   review to call if symptoms not resolved    First trimester pregnancy   reviewed precautions to call with   she has sequential screen today with [facility]  13 weeks gestation of pregnancy  -     miconazole (MONISTAT-7) 2 % vaginal cream; Insert 1 applicator into the vagina daily at bedtime for 7 days           MALIA - 10/13/2021 Problems (from 03/05/21 to present)     No problems associated with this episode

## 2021-04-07 ENCOUNTER — ROUTINE PRENATAL (OUTPATIENT)
Dept: OBGYN CLINIC | Facility: CLINIC | Age: 26
End: 2021-04-07

## 2021-04-07 ENCOUNTER — ROUTINE PRENATAL (OUTPATIENT)
Dept: PERINATAL CARE | Facility: OTHER | Age: 26
End: 2021-04-07

## 2021-04-07 VITALS
HEART RATE: 62 BPM | HEIGHT: 58 IN | DIASTOLIC BLOOD PRESSURE: 67 MMHG | SYSTOLIC BLOOD PRESSURE: 105 MMHG | WEIGHT: 112 LBS | BODY MASS INDEX: 23.51 KG/M2

## 2021-04-07 VITALS
DIASTOLIC BLOOD PRESSURE: 64 MMHG | WEIGHT: 112.43 LBS | BODY MASS INDEX: 23.6 KG/M2 | HEIGHT: 58 IN | HEART RATE: 62 BPM | SYSTOLIC BLOOD PRESSURE: 110 MMHG

## 2021-04-07 DIAGNOSIS — Z3A.13 13 WEEKS GESTATION OF PREGNANCY: ICD-10-CM

## 2021-04-07 DIAGNOSIS — B37.3 VAGINAL YEAST INFECTION: Primary | ICD-10-CM

## 2021-04-07 DIAGNOSIS — Z36.89 ENCOUNTER TO ESTABLISH GESTATIONAL AGE USING ULTRASOUND: Primary | ICD-10-CM

## 2021-04-07 DIAGNOSIS — Z34.91 FIRST TRIMESTER PREGNANCY: ICD-10-CM

## 2021-04-07 PROBLEM — B37.31 VAGINAL YEAST INFECTION: Status: ACTIVE | Noted: 2021-04-07

## 2021-04-07 LAB
BV WHIFF TEST VAG QL: POSITIVE
CLUE CELLS SPEC QL WET PREP: NEGATIVE
PH SMN: 4.5 [PH]
SL AMB POCT WET MOUNT: POSITIVE
T VAGINALIS VAG QL WET PREP: NEGATIVE
YEAST VAG QL WET PREP: ABNORMAL

## 2021-04-07 PROCEDURE — 99202 OFFICE O/P NEW SF 15 MIN: CPT | Performed by: OBSTETRICS & GYNECOLOGY

## 2021-04-07 PROCEDURE — 76801 OB US < 14 WKS SINGLE FETUS: CPT | Performed by: OBSTETRICS & GYNECOLOGY

## 2021-04-07 PROCEDURE — 87210 SMEAR WET MOUNT SALINE/INK: CPT | Performed by: NURSE PRACTITIONER

## 2021-04-07 PROCEDURE — 99213 OFFICE O/P EST LOW 20 MIN: CPT | Performed by: NURSE PRACTITIONER

## 2021-04-07 NOTE — PROGRESS NOTES
Please refer to the [facility] ultrasound report in Ob Procedures for additional information regarding today's visit

## 2021-04-07 NOTE — LETTER
April 7, 2021     [address]  PROVIDER    Patient: Yvette Plummer   YOB: 1995   Date of Visit: 4/7/2021       Dear   Provider: Thank you for referring Yvette Plummer to me for evaluation  Below are my notes for this consultation  If you have questions, please do not hesitate to call me  I look forward to following your patient along with you  Sincerely,        Ashwini Malave MD        CC: No Recipients  Ashwini Malave MD  4/7/2021  2:37 PM  Sign when Signing Visit    Please refer to the [facility] ultrasound report in Ob Procedures for additional information regarding today's visit

## 2021-04-07 NOTE — PATIENT INSTRUCTIONS
Candidiasis (infección por hongos)   LO QUE NECESITA SABER:   La infección por hongos o candidiasis vulvovaginal es arnold infección vaginal común  La candidiasis vulvovaginal es causada por un hongo o microorganismos levaduriformes  Los hongos se encuentran normalmente en la vagina  Muchos hongos pueden causar Pitney Helio  INSTRUCCIONES SOBRE EL [facility]ITAL[facility]:   Comuníquese con marshall médico si:  · Usted tiene fiebre y [address]  · Usted desarrolla un dolor abdominal o pélvico     · La secreción contiene moses y no es de la menstruación  · Colette signos y [address], [address]  · Usted tiene preguntas o inquietudes acerca de marshall condición o cuidado  Medicamentos:  · Los medicamentos ayudan a tratar la infección por el hongo de la cándida y a disminuir la inflamación  El [employer] puede venir en presentación de píldora, crema, ungüento, comprimido o supositorio para la vagina  · Maili colette medicamentos aristeo se le haya indicado  Consulte con marshall médico si usted janet que marshall medicamento no le está ayudando o si presenta efectos secundarios  Infórmele si es alérgico a algún medicamento  Mantenga arnold lista actualizada de los [employer], las vitaminas y los productos herbales que tomas  Incluya los siguientes datos de los medicamentos: cantidad, frecuencia y motivo de administración  Traiga con usted la lista o los envases de las píldoras a colette citas de seguimiento  Lleve la lista de los medicamentos con usted en flakita de arnold emergencia  Mantenga marshall vagina saludable:  · No tenga relaciones sexuales hasta que colette síntomas hayan desaparecido  [place] que marshall alexis use un preservativo hasta que usted complete el [place]  · Límpiese siempre de adelante hacia atrás después de usar el inodoro  Rosser zainab la diseminación de bacterias desde el área rectal hacia la vagina  · Limpie alrededor de la vulva con agua tibia y un jabón suave todos los días   Después de Bernie Mighty suavemente  No use jacuzzis  El calor y la humedad de los jacuzzis pueden aumentar el riesgo de otra candidiasis  · No use ropa ni lencería apretada delores largos períodos  Que use ropa interior de [employer]  El algodón ayuda a mantener el área genital seca y no mantiene el calor o la humedad  No use ninguna ropa interior por las noches  · Cambie el detergente o suavizante de telas si janet que irrita marshall piel  · No tome duchas vaginales ni use aerosoles de higiene femenina o tyler de burbujas  No utilice almohadillas o tampones que son perfumados o de colores ni papel higiénico perfumado  · Consulte con marshall médico acerca de las opciones de anticonceptivos, si es necesario  Los condones de látex y los diafragmas tienen un gel que [employer] espermatozoides  Ambos pueden irritar el área genital     Lisa Hermelindo a cyrus consultas de control con marshall médico según le indicaron  Anote cyrus preguntas para que se acuerde de hacerlas delores cyrus visitas  © Copyright [address] Information is for End User's use only and may not be sold, redistributed or otherwise used for commercial purposes  All illustrations and images included in CareNotes® are the copyrighted property of [facility]  or [address] [place] [address] es sólo para uso en educación  Marshall intención no es darle un consejo médico sobre enfermedades o tratamientos  Colsulte con marshall [employer] farmac[employer]ut[employer] antes de seguir cualquier régimen médico para saber si es seguro y efectivo para usted

## 2021-04-08 ENCOUNTER — PATIENT OUTREACH (OUTPATIENT)
Dept: OBGYN CLINIC | Facility: CLINIC | Age: 26
End: 2021-04-08

## 2021-04-08 NOTE — PROGRESS NOTES
BETTY GALICIA spoke with Pt for follow up  Pt is  from her  who remains in [place]  Pt reported she is doing well  Pt has [address]  Pt still residing with brother  MICHELLE is very involved and supportive  BETTY GALICIA introduce herself and encouraged to call at any time needed

## 2021-05-14 ENCOUNTER — PATIENT OUTREACH (OUTPATIENT)
Dept: OBGYN CLINIC | Facility: CLINIC | Age: 26
End: 2021-05-14

## 2021-05-14 NOTE — PROGRESS NOTES
BETTY GALICIA spoke with Pt for follow up  Pt reported she is doing well  Still working on week ends  Pt states everything is going well with pregnancy  Pt denies concerns or questions  Pt was reminded to call BETTY GALICIA at any time needed

## 2021-05-26 ENCOUNTER — ROUTINE PRENATAL (OUTPATIENT)
Dept: PERINATAL CARE | Facility: OTHER | Age: 26
End: 2021-05-26

## 2021-05-26 VITALS
WEIGHT: 117.73 LBS | DIASTOLIC BLOOD PRESSURE: 95 MMHG | SYSTOLIC BLOOD PRESSURE: 109 MMHG | HEART RATE: 62 BPM | HEIGHT: 58 IN | BODY MASS INDEX: 24.71 KG/M2

## 2021-05-26 DIAGNOSIS — Z36.3 ENCOUNTER FOR ANTENATAL SCREENING FOR MALFORMATIONS: Primary | ICD-10-CM

## 2021-05-26 DIAGNOSIS — Z3A.20 20 WEEKS GESTATION OF PREGNANCY: ICD-10-CM

## 2021-05-26 DIAGNOSIS — Z36.86 ENCOUNTER FOR ANTENATAL SCREENING FOR CERVICAL LENGTH: ICD-10-CM

## 2021-05-26 PROCEDURE — 76805 OB US >/= 14 WKS SNGL FETUS: CPT | Performed by: OBSTETRICS & GYNECOLOGY

## 2021-05-26 PROCEDURE — 76817 TRANSVAGINAL US OBSTETRIC: CPT | Performed by: OBSTETRICS & GYNECOLOGY

## 2021-05-26 PROCEDURE — 99213 OFFICE O/P EST LOW 20 MIN: CPT | Performed by: OBSTETRICS & GYNECOLOGY

## 2021-05-26 NOTE — PROGRESS NOTES
Ultrasound Probe Disinfection    A transvaginal ultrasound was performed  Prior to use, disinfection was performed with High Level Disinfection Process (Trophon)  Probe serial number A1: [id number] was used        Ashleyora Shivani  05/26/21  3:22 PM

## 2021-05-26 NOTE — PATIENT INSTRUCTIONS
Thank you for choosing us for your  care today  If you have any questions about your ultrasound or care, please do not hesitate to contact us or your primary obstetrician  Some general instructions for your pregnancy are:     Protect against coronavirus: Continue to practice social distancing, wear a mask, and wash your hands often  Pregnant women are increased risk of severe COVID  Notify your primary care doctor if you have any symptoms including cough, shortness of breath or difficulty breathing, fever, chills, muscle pain, sore throat, or loss of taste or smell  Pregnant women can receive the coronavirus vaccine   Exercise: Aim for 22 minutes per day (150 minutes per week) of regular exercise  Walking is great!  Nutrition: aim for calcium-rich and iron-rich foods as well as healthy sources of protein   Protect against the flu: get yourself and your entire household vaccinated against influenza  This will protect your baby   Learn about Preeclampsia: preeclampsia is a common, serious high blood pressure complication in pregnancy  A blood pressure of 927OCKR (systolic or top number) or 11BFBI (diastolic or bottom number) is not normal and needs evaluation by your doctor   If you smoke, try to reduce how many cigarettes you smoke or try to quit completely  Do not vape   Other warning signs to watch out for in pregnancy or postpartum: chest pain, obstructed breathing or shortness of breath, seizures, thoughts of hurting yourself or your baby, bleeding, a painful or swollen leg, fever, or headache (see AWHONN POST-BIRTH Warning Signs campaign)  If these happen call 911  Itching is also not normal in pregnancy and if you experience this, especially over your hands and feet, potentially worse at night, notify your doctors     Lastly, if you are contacted regarding participation in a survey about your experience in our office, please know that we take any feedback you provide seriously and use it to improve how we deliver care through our center

## 2021-06-28 ENCOUNTER — PATIENT OUTREACH (OUTPATIENT)
Dept: OBGYN CLINIC | Facility: CLINIC | Age: 26
End: 2021-06-28

## 2021-06-28 NOTE — PROGRESS NOTES
BETTY AGLICIA spoke with Pt for follow up  Pt reported she is doing well  Pt still working  Has [place]IC and [place]  Pt denies question or concerns  BETTY GALICIA noted that Pt was in need of pre  appointment and was transfer to schedule one  Pt will call BETTY GALICIA at any time needed

## 2021-07-01 ENCOUNTER — ROUTINE PRENATAL (OUTPATIENT)
Dept: OBGYN CLINIC | Facility: CLINIC | Age: 26
End: 2021-07-01

## 2021-07-01 VITALS
HEIGHT: 58 IN | DIASTOLIC BLOOD PRESSURE: 58 MMHG | WEIGHT: 120 LBS | BODY MASS INDEX: 25.19 KG/M2 | SYSTOLIC BLOOD PRESSURE: 95 MMHG | HEART RATE: 73 BPM

## 2021-07-01 DIAGNOSIS — Z3A.25 25 WEEKS GESTATION OF PREGNANCY: Primary | ICD-10-CM

## 2021-07-01 DIAGNOSIS — Z34.92 SECOND TRIMESTER PREGNANCY: ICD-10-CM

## 2021-07-01 PROCEDURE — 99213 OFFICE O/P EST LOW 20 MIN: CPT | Performed by: OBSTETRICS & GYNECOLOGY

## 2021-07-01 NOTE — ASSESSMENT & PLAN NOTE
28-w labs pre-ordered  Advised to complete them 1-2 days before next appointment  No further ultrasounds indicated at this time  Undecided about contraception   Continue to address   labor precautions reviewed  RTO in 3 weeks

## 2021-07-01 NOTE — PROGRESS NOTES
Marcos Lau presents today for routine OB visit at 25w1d  S:  She reports no complaints today  Denies uterine contractions or persistent cramping  Denies vaginal bleeding or leaking of fluid  Normal fetal movement  Has not yet spoke to her  about contraception  O:  Blood Pressure: 95/58  Wt=54 4 kg (120 lb); Body mass index is 25 52 kg/m² ;   Fetal Heart Rate: 151; Fundal Height (cm): 25 cm    Pulm: Non-labored breathing  Abdomen: gravid, soft, non-tender  Extremities: non-tender, no edema  A/P:  Problem List Items Addressed This Visit        Other    Second trimester pregnancy    25 weeks gestation of pregnancy - Primary     28-w labs pre-ordered  Advised to complete them 1-2 days before next appointment  No further ultrasounds indicated at this time  Undecided about contraception  Continue to address   labor precautions reviewed  RTO in 3 weeks          Relevant Orders    CBC and Platelet    Comprehensive metabolic panel    RPR        Patient discussed with Dr Mary Musa MD  PGY II, OB/GYN  2021, 3:50 PM

## 2021-07-12 ENCOUNTER — APPOINTMENT (OUTPATIENT)
Dept: LAB | Facility: CLINIC | Age: 26
End: 2021-07-12

## 2021-07-12 DIAGNOSIS — Z3A.25 25 WEEKS GESTATION OF PREGNANCY: ICD-10-CM

## 2021-07-12 LAB
ALBUMIN SERPL BCP-MCNC: 2.5 G/DL (ref 3.5–5)
ALP SERPL-CCNC: 148 U/L (ref 46–116)
ALT SERPL W P-5'-P-CCNC: 16 U/L (ref 12–78)
ANION GAP SERPL CALCULATED.3IONS-SCNC: 10 MMOL/L (ref 4–13)
AST SERPL W P-5'-P-CCNC: 20 U/L (ref 5–45)
BILIRUB SERPL-MCNC: 0.21 MG/DL (ref 0.2–1)
BUN SERPL-MCNC: 6 MG/DL (ref 5–25)
CALCIUM ALBUM COR SERPL-MCNC: 9.6 MG/DL (ref 8.3–10.1)
CALCIUM SERPL-MCNC: 8.4 MG/DL (ref 8.3–10.1)
CHLORIDE SERPL-SCNC: 103 MMOL/L (ref 100–108)
CO2 SERPL-SCNC: 25 MMOL/L (ref 21–32)
CREAT SERPL-MCNC: 0.49 MG/DL (ref 0.6–1.3)
ERYTHROCYTE [DISTWIDTH] IN BLOOD BY AUTOMATED COUNT: 13.1 % (ref 11.6–15.1)
GFR SERPL CREATININE-BSD FRML MDRD: 136 ML/MIN/1.73SQ M
GLUCOSE SERPL-MCNC: 79 MG/DL (ref 65–140)
HCT VFR BLD AUTO: 31.9 % (ref 34.8–46.1)
HGB BLD-MCNC: 9.9 G/DL (ref 11.5–15.4)
MCH RBC QN AUTO: 27 PG (ref 26.8–34.3)
MCHC RBC AUTO-ENTMCNC: 31 G/DL (ref 31.4–37.4)
MCV RBC AUTO: 87 FL (ref 82–98)
PLATELET # BLD AUTO: 371 THOUSANDS/UL (ref 149–390)
PMV BLD AUTO: 9.1 FL (ref 8.9–12.7)
POTASSIUM SERPL-SCNC: 4 MMOL/L (ref 3.5–5.3)
PROT SERPL-MCNC: 7.3 G/DL (ref 6.4–8.2)
RBC # BLD AUTO: 3.66 MILLION/UL (ref 3.81–5.12)
SODIUM SERPL-SCNC: 138 MMOL/L (ref 136–145)
WBC # BLD AUTO: 8.79 THOUSAND/UL (ref 4.31–10.16)

## 2021-07-12 PROCEDURE — 85027 COMPLETE CBC AUTOMATED: CPT

## 2021-07-12 PROCEDURE — 86592 SYPHILIS TEST NON-TREP QUAL: CPT

## 2021-07-12 PROCEDURE — 36415 COLL VENOUS BLD VENIPUNCTURE: CPT

## 2021-07-12 PROCEDURE — 80053 COMPREHEN METABOLIC PANEL: CPT

## 2021-07-13 LAB — RPR SER QL: NORMAL

## 2021-07-14 DIAGNOSIS — Z3A.27 27 WEEKS GESTATION OF PREGNANCY: Primary | ICD-10-CM

## 2021-07-14 DIAGNOSIS — O99.012 ANEMIA AFFECTING PREGNANCY IN SECOND TRIMESTER: ICD-10-CM

## 2021-07-14 RX ORDER — FERROUS SULFATE TAB EC 324 MG (65 MG FE EQUIVALENT) 324 (65 FE) MG
324 TABLET DELAYED RESPONSE ORAL
Qty: 60 TABLET | Refills: 2 | Status: SHIPPED | OUTPATIENT
Start: 2021-07-14

## 2021-07-15 ENCOUNTER — TELEPHONE (OUTPATIENT)
Dept: OBGYN CLINIC | Facility: CLINIC | Age: 26
End: 2021-07-15

## 2021-07-15 NOTE — TELEPHONE ENCOUNTER
----- Message from Marsha Cash MD sent at 7/14/2021  3:19 PM EDT -----  Can you please let Fany know I have ordered a 1-hr GTT for her to be completed before her next appointment  I have also prescribed her iron supplement since she now has anemia with hemoglobin of 9 9      Thanks,  [employer]

## 2021-07-17 ENCOUNTER — LAB (OUTPATIENT)
Dept: LAB | Facility: CLINIC | Age: 26
End: 2021-07-17

## 2021-07-17 DIAGNOSIS — Z3A.27 27 WEEKS GESTATION OF PREGNANCY: ICD-10-CM

## 2021-07-17 LAB — GLUCOSE 1H P 50 G GLC PO SERPL-MCNC: 116 MG/DL (ref 40–134)

## 2021-07-17 PROCEDURE — 36415 COLL VENOUS BLD VENIPUNCTURE: CPT

## 2021-07-17 PROCEDURE — 82950 GLUCOSE TEST: CPT

## 2021-07-19 ENCOUNTER — TELEPHONE (OUTPATIENT)
Dept: OBGYN CLINIC | Facility: CLINIC | Age: 26
End: 2021-07-19

## 2021-07-21 NOTE — PROGRESS NOTES
OB/GYN prenatal visit    S: 22 y o  [id number] 28w0d here for PN visit  She has no obstetric complaints, including pelvic pain, contractions, vaginal bleeding, loss of fluid, or decreased fetal movement  She continues to complain of vaginal discharge and odor without irritation  Work up was negative and patient was counseled regarding normal discharge of pregnancy and hygiene  Is considering  Delivery Consent signed      Conducted with [employer]  [id number]    O:  Vitals:    21 0819   BP: 94/59   Pulse: 78       Gen: no acute distress, nonlabored breathing  Fundal Height (cm): 29 cm  Fetal Heart Rate: 145    A/P:      IUP at 28w0d  No obstetric complaints today  Flu vaccine March, TDAP vaccine desires but left before administration  Contraception: considering IUD  Breastfeeding: yes  Discussed  labor precautions and fetal kick counts    Switched HSV suppression and counseled on continuation through and after delivery  Return to clinic in 2 weeks      COVID 19 precautions were discussed with patient at length, reviewed symptoms, hygiene, social distancing, patient to call office  with Cedric Balderas MD  2021  9:31 AM

## 2021-07-22 ENCOUNTER — ROUTINE PRENATAL (OUTPATIENT)
Dept: OBGYN CLINIC | Facility: CLINIC | Age: 26
End: 2021-07-22

## 2021-07-22 VITALS
HEART RATE: 78 BPM | HEIGHT: 58 IN | SYSTOLIC BLOOD PRESSURE: 94 MMHG | BODY MASS INDEX: 25.61 KG/M2 | WEIGHT: 122 LBS | DIASTOLIC BLOOD PRESSURE: 59 MMHG

## 2021-07-22 DIAGNOSIS — Z34.93 PRENATAL CARE IN THIRD TRIMESTER: ICD-10-CM

## 2021-07-22 DIAGNOSIS — O99.012 ANEMIA AFFECTING PREGNANCY IN SECOND TRIMESTER: ICD-10-CM

## 2021-07-22 DIAGNOSIS — Z23 NEED FOR TDAP VACCINATION: ICD-10-CM

## 2021-07-22 DIAGNOSIS — A60.00 GENITAL HERPES SIMPLEX, UNSPECIFIED SITE: Primary | ICD-10-CM

## 2021-07-22 DIAGNOSIS — Z3A.28 28 WEEKS GESTATION OF PREGNANCY: ICD-10-CM

## 2021-07-22 PROBLEM — B37.3 VAGINAL YEAST INFECTION: Status: RESOLVED | Noted: 2021-04-07 | Resolved: 2021-07-22

## 2021-07-22 PROBLEM — N91.2 AMENORRHEA: Status: RESOLVED | Noted: 2018-10-12 | Resolved: 2021-07-22

## 2021-07-22 PROBLEM — B37.31 VAGINAL YEAST INFECTION: Status: RESOLVED | Noted: 2021-04-07 | Resolved: 2021-07-22

## 2021-07-22 PROCEDURE — 90471 IMMUNIZATION ADMIN: CPT

## 2021-07-22 PROCEDURE — 99213 OFFICE O/P EST LOW 20 MIN: CPT | Performed by: OBSTETRICS & GYNECOLOGY

## 2021-07-22 PROCEDURE — 90715 TDAP VACCINE 7 YRS/> IM: CPT

## 2021-07-22 RX ORDER — VALACYCLOVIR HYDROCHLORIDE 500 MG/1
500 TABLET, FILM COATED ORAL 2 TIMES DAILY
Qty: 60 TABLET | Refills: 11 | Status: SHIPPED | OUTPATIENT
Start: 2021-07-22 | End: 2022-07-22

## 2021-07-23 NOTE — PROGRESS NOTES
28 week education packet provided to patient on 07/22/21  Included in packet:   Third Trimester paperwork  Delivery consent   Birthing room support person rules and acknowledgment  Birth Plan   Welcome information  Birth certificate worksheet   Consent for Photographers  Perineal/ Vaginal massage   Pediatric practices and locations

## 2021-07-27 ENCOUNTER — TELEPHONE (OUTPATIENT)
Dept: OBGYN CLINIC | Facility: CLINIC | Age: 26
End: 2021-07-27

## 2021-07-27 NOTE — TELEPHONE ENCOUNTER
Pt called c/o no feeling baby move since Sunday  Pt was advised to go to [address]   Pt verbalized understanding

## 2021-08-04 PROBLEM — Z3A.30 30 WEEKS GESTATION OF PREGNANCY: Status: ACTIVE | Noted: 2021-04-07

## 2021-08-04 PROBLEM — O99.013 ANEMIA AFFECTING PREGNANCY IN THIRD TRIMESTER: Status: ACTIVE | Noted: 2021-07-14

## 2021-08-04 NOTE — PATIENT INSTRUCTIONS
El embarazo de la semana 31 a la 34   CUIDADO AMBULATORIO:   Qué cambios están ocurriendo en marshall cuerpo: Es posible que usted continúe teniendo síntomas tales aristeo falta de [place], [place], contracciones o inflamación en cyrus tobillos y pies  Usted podría estar aumentando 1 nicolas por semana ahora  Busque atención médica de inmediato si:  · Usted presenta un michael dolor de jgina que no desaparece  · Usted tiene cambios en la visión nuevos o en aumento, aristeo visión borrosa o con manchas  · Usted tiene inflamación nueva o creciente en marshall omer o andrés  · Usted tiene manchado o sangrado vaginal     · Usted rompe lovely o siente un chorro o gotas de agua tibia que le está bajando por marshall vagina  Comuníquese con marshall médico si:  · Usted tiene más de 5 contracciones en 1 hora  · Usted nota algún cambio en los movimientos de marshall bebé  · Usted tiene calambres, presión o tensión abdominal     · Usted tiene un cambio en la secreción vaginal     · Usted tiene escalofríos o fiebre  · Usted tiene comezón, ardor o dolor vaginal     · Usted tiene arnold secreción vaginal amarillenta, verdosa, sylvester o de [employer]  · Usted tiene dolor o ardor al Rosemaire Gull, orina menos de lo habitual o tiene [place] rosada o sanguinolenta  · Usted tiene preguntas o inquietudes acerca de marshall condición o cuidado  Cómo cuidarse en esta etapa de marshall embarazo:  · Consuma alimentos saludables y variados  Alimentos saludables incluyen frutas, verduras, panes de wanda integral, alimentos lácteos bajos en grasa, frijoles, donnie magras y pescado  [place] líquidos aristeo se le haya indicado  Pregunte cuánto líquido debe teri cada día y cuáles líquidos son los más adecuados para usted  Limite el consumo de cafeína a menos de [place]  Limite el consumo de pescado a 2 porciones cada semana  Escoja pescado con concentraciones bajas de ryne aristeo atún al natural enlatado, camarón, salmón, bacalao o tilapia   No coma pescado con concentraciones altas de ryne aristeo pez alyson, caballa gigante, pargo rayado y tiburón  · Controle la acidez comiendo 4 o 5 comidas pequeñas cada día en vez de comidas grandes  Evite los alimentos picantes  · Controle la inflamación al [facility] y poner los pies en alto o elevados sobre [place]  · [address] [place]  Marshall necesidad de ciertas vitaminas y [facility], aristeo el ácido fólico, aumenta delores el [facility]  Las vitaminas prenatales proporcionan algunas de las vitaminas y minerales adicionales que usted necesita  Las vitaminas prenatales también podrían ayudar a disminuir el riesgo de ciertos defectos de nacimiento  · Consulte con marshall médico acerca de hacer ejercicio  El ejercicio moderado puede ayudarla a mantenerse en forma  Marshall médico la ayudará a planear un programa de ejercicios que sea seguro para usted delores marshall [facility]  · No fume  Fumar aumenta el riesgo de aborto espontáneo y otros problemas de izbaela delores marshall [facility]  Fumar puede causar que marshall bebé nazca antes de tiempo o que pese menos al nacer  Solicite información a marshall médico si usted necesita ayuda para dejar de fumar  · No consuma alcohol  El alcohol pasa de marshall cuerpo al bebé a través de la placenta  Puede afectar el desarrollo del cerebro de marshall bebé y provocar el síndrome de alcoholismo fetal (SAF)  El SAF es un nils de condiciones que causan problemas [place], de comportamiento y de crecimiento  · Consulte con marshall médico antes de teri cualquier medicamento  Muchos medicamentos pueden perjudicar a marshall bebé si usted los tomas [address] [place] [address]  No tome ningún medicamento, vitaminas, hierbas o suplementos sin ikna consultar con marshall Mook Calender  use drogas ilegales o de la sandhu (aristeo marihuana o cocaína) mientras está embarazada  Consejos de seguridad delores el embarazo:  · Evite jacuzzis y saunas   No use un jacuzzi o un sauna mientras usted está [place], especialmente delores el primer trimestre  Los [employer] y los saunas aumentan la temperatura de hurt bebé y el riesgo de defectos de nacimiento  · Evite la toxoplasmosis  [place] es arnold infección causada por comer carne cruda o estar cerca del excremento de un soraya infectado  [place] puede causar malformaciones congénitas, aborto espontáneo y Sanjeev Schein  Lávese las andrés después de tocar carne cruda  Asegúrese de que la carne esté gema cocida antes de comerla  Evite los huevos crudos y la Brandy Span  Use guantes o pida que alguien la ayude a limpiar la caja de arena del soraya mientras usted Merla Ledger  Cambios que están ocurriendo con hurt bebé: Para las 34 semanas, hurt bebé podría pesar más de [address]  Hurt bebé medirá alrededor de 12 ½ pulgadas de jackson desde el jessie de la jigna hasta la rabadilla (parte inferior del bebé)  Hurt bebé está aumentando alrededor de ½ nicolas por semana  Ahora hurt bebé puede abrir y cerrar cyrus ojos  Las patadas y movimientos de hurt bebé son más travis en hemalatha momento  Lo que necesita saber acerca del cuidado prenatal: Hurt médico le revisará hurt presión arterial y [place]  Es posible que también necesite lo siguiente:  · Un examen de orina también podría realizarse para revisarle el azúcar y la proteína  Estas son señales de diabetes gestacional o de infección  La proteína en hurt orina también podría ser arnold señal de preeclampsia  La preeclampsia es arnold condición que puede desarrollarse delores la semana 21 o después en hurt embarazo  Esta provoca presión arterial alan y [employer] con cyrus riñones y [place]  · La vacuna Tdap podría ser recomendado por hurt médico     · La altura uterina es arnold medición del útero para controlar el desarrollo de hurt bebé  [employer] por lo general es igual al número de [address] que usted tiene de embarazo  Hurt médico también podría revisar la posición de hurt bebé  · El ritmo cardíaco de hurt bebé será revisado      © Copyright [employer] 2021 Information is for [employer] use only and may not be sold, redistributed or otherwise used for commercial purposes  All illustrations and images included in CareNotes® are the copyrighted property of A D A M , Inc  or [address] [place] [address] es sólo para uso en educación  Marshall intención no es darle un consejo médico sobre enfermedades o tratamientos  Colsulte con marshall Rosette Kumar farmacéutico antes de seguir cualquier régimen médico para saber si es seguro y efectivo para usted  Movimiento fetal   LO QUE NECESITA SABER:   Los movimientos fetales son las patadas, vueltas e hipo del bebé en el [place]  Es posible que usted empiece a sentir estos movimientos aproximadamente a las [address]  A medida que marshall bebé crezca, los movimientos se sienten más travis y son Lorenzo Best frecuentes  Los movimientos del feto comprueban que marshall bebé en el útero está recibiendo el oxígeno y los nutrientes necesarios antes de nacer  La reducción de movimientos fetales podría señalar un problema de la izabela de marshall bebé  INSTRUCCIONES SOBRE EL [facility] HOSPITAL[facility]IA:   Programe un control con marshall médico u obstetra según lo indicado: Anote cyrus preguntas para que se acuerde de hacerlas delores cyrus visitas  Movimientos normales del feto: La actividad del jesusita en el útero puede describirse en 4 estados, del menos activo al [place] na [facility]  Delores el estado de sueño [facility] Hospital[facility], es probable que marshall agata por nacer permanezca quieto un erich de 2 horas  Delores el estado de sueño [place], marshall bebé patalea, da vueltas y se mueve con frecuencia  Delores el estado vigilia tranquila, marshall agata podría solamente  cyrus ojos  El estado despierto activo incluye patadas travis y vueltas  Lo que afecta el movimiento fetal: Es posible que sienta a marshall bebé moverse más después de que usted haya comido o bebido cafeína   Es probable que usted sienta menos movimientos de marshall bebé en el útero cuando está más [place], aristeo cuando hace ejercicio  También podría sentir menos movimientos del feto si usted es Matt Loach  Ciertos medicamentos pueden afectar los movimientos de marshall bebé  Infórmele a marshall médico los medicamentos que tomas  Monitoreo de los movimientos del bebé en la casa: La mayoría de los movimientos de marshall bebé en el útero se notan cuando usted se acuesta silenciosamente de lado  Es probable que marshall médico le pida que cuente los movimientos del feto delores 2 horas  Podría pedirle que registre el tiempo que marshall bebé dura para realizar 10 movimientos  Mantenga un registro de los movimientos de marshall bebé  Comuníquese con marshall médico u obstetra si:  · Tarda más de lo usual para sentir 10 movimientos de marshall bebé en el útero  · Usted no siente a marshall bebé moverse en el útero por lo menos 10 veces cada 2 horas  · La piel de cyrus andrés, pies y alrededor de cyrus ojos se encuentra más inflamada de lo normal     · Usted tiene dolor de jigna delores por lo menos 24 horas  · Le aparecen puntos rojos pequeñitos en la piel  · Marshall estómago se siente sensible al aplicarle presión  · Usted tiene preguntas o inquietudes acerca de marshall condición o cuidado  Regrese a la kiah de emergencias si:  · Usted no siente a marshall bebé en el útero moverse por 12 horas  · Usted siente calambres o dolor rose mary en el abdomen  · Usted tiene sangrado vaginal abundante  · Usted tiene dolor de [place] severo y no puede neelam con claridad  · Usted tiene dificultad para respirar o está vomitando  · Usted sufre arnold convulsión  © [facility] 2021 Information is for End User's use only and may not be sold, redistributed or otherwise used for commercial purposes  All illustrations and images included in CareNotes® are the copyrighted property of A D A [employer] , Inc  or [address] [place] [address] es sólo para uso en educación  Marshall intención no es darle un consejo médico sobre enfermedades o tratamientos   Colsulte con marshall Mily Custard antes de seguir cualquier régimen médico para saber si es seguro y efectivo para usted  Candidiasis (infección por hongos)   LO QUE NECESITA SABER:   La infección por hongos, o candidiasis vaginal, arnold infección vaginal común  La infección por hongos es causada por un hongo o microorganismo levaduriforme  Los hongos se encuentran normalmente en la vagina  Muchos hongos pueden causar Pitney Helio  INSTRUCCIONES SOBRE EL JOSEPH HOSPITALARIA:   Llame a marshall médico o ginecólogo si:  · Usted tiene fiebre y [address]  · Usted desarrolla un dolor abdominal o pélvico     · La secreción contiene moses y no es de la menstruación  · [place] signos y [address], [address]  · Usted tiene preguntas o inquietudes acerca de marshall condición o cuidado  Medicamentos:  · Los medicamentos ayudan a tratar la infección por el hongo de la cándida y a disminuir la inflamación  El [place] puede venir en presentación de píldora, crema, ungüento, comprimido o supositorio para la vagina  · [place] colette medicamentos aristeo se le haya indicado  Consulte con marshall médico si usted janet que marshall medicamento no le está ayudando o si presenta efectos secundarios  Infórmele si es alérgico a algún medicamento  Mantenga arnold lista actualizada de los [place], las vitaminas y los productos herbales que tomas  Incluya los siguientes datos de los medicamentos: cantidad, frecuencia y motivo de administración  Traiga con usted la lista o los envases de las píldoras a colette citas de seguimiento  Lleve la lista de los medicamentos con usted en flakita de arnold emergencia  Mantenga marshall vagina saludable:  · Limpie alrededor del área genital con agua tibia y un jabón suave todos los GRASSE  No coloque jabón dentro de la vagina  Después de lavada, séquela suavemente  No use jacuzzis  El calor y la humedad de los jacuzzis pueden aumentar el riesgo de otra candidiasis      · Límpiese siempre de adelante hacia atrás después de usar el inodoro  [place] zainab la diseminación de bacterias desde el área rectal hacia la vagina  · No use ropa ni lencería apretada delores largos períodos  Que use ropa interior de [employer]  El algodón ayuda a mantener el área genital seca y no mantiene el calor o la humedad  No use ninguna ropa interior por las noches  · No tome duchas vaginales ni use aerosoles de higiene femenina o tyler de burbujas  No utilice almohadillas o tampones que son perfumados o de colores ni papel higiénico perfumado  · No tenga relaciones sexuales hasta que cyrus síntomas hayan desaparecido  [place] que marshall alexis use un preservativo hasta que usted complete el [place]  · Consulte con marshall médico acerca de las opciones de anticonceptivos, si es necesario  Los condones de látex y los diafragmas tienen un gel que [employer] espermatozoides  Ambos pueden irritar el área genital     [place] a cyrus consultas de control con marshall médico o ginecólogo según le indicaron: Anote cyrus preguntas para que se acuerde de hacerlas delores cyrus visitas  © Copyright [employer] 2021 Information is for End User's use only and may not be sold, redistributed or otherwise used for commercial purposes  All illustrations and images included in CareNotes® are the copyrighted property of A D A [employer]  or [address] [place] [address] es sólo para uso en educación  Marshall intención no es darle un consejo médico sobre enfermedades o tratamientos  Colsulte con marshall [employer] farmacéutico antes de seguir cualquier régimen médico para saber si es seguro y efectivo para usted

## 2021-08-04 NOTE — PROGRESS NOTES
Fany Whelan presents today for routine OB visit at 30w0d  Blood Pressure: 96/61  Wt=55 3 kg (122 lb); Body mass index is 25 94 kg/m² ; TWG=Not found  Fetal Heart Rate: 137; Fundal Height (cm): 30 cm  Abdomen: gravid, soft, non-tender  She reports vaginal itching and discharge, history of used in pregnancy last treated for/2021  Thick white curdy discharge on speculum exam  Wet mount KOH positive for yeast, negative for BV will treat with miconazole 1 applicator at night times 10 days  Denies uterine contractions  Denies vaginal bleeding or leaking of fluid  Reports adequate fetal movement of at least 10 movements in 2 hours once daily  Last ultrasound was 05/26/2021  Scheduled for ultrasound located  Reviewed premature labor precautions and fetal kick counts  Tdap - has recweived  Contraception--considering IUD  Plans on breastfeeding  HSV, on daily 500 mg Valtrex b i d  until delivery  Her 1 hour GTT was 116, her hemoglobin was 9 9-on iron b i d  and Colace  Her CMP was within normal  Advised to continue medications and return in 2 weeks    COVID 19 precautions were discussed with patient, reviewed symptoms, masking, hygiene, social distancing, avoid high risk gatherings, patient to call office with questions or concerns    Current Outpatient Medications   Medication Instructions    doxylamine (UNISON) 12 5 mg, Oral, Daily at bedtime PRN    ferrous sulfate 324 mg, Oral, 2 times daily before meals    miconazole (MONISTAT-7) 2 % vaginal cream 1 applicator, Vaginal, Daily at bedtime    Prenatal Vit-Fe Fumarate-FA (Prenatal Complete) 14-0 4 MG TABS 1 tablet, Oral, Daily    pyridoxine (B-6) 25 mg, Oral, Daily    valACYclovir (VALTREX) 500 mg, Oral, 2 times daily         MALIA - 10/13/2021 Problems (from 03/05/21 to present)     Problem Noted Resolved    28 weeks gestation of pregnancy 4/7/2021 by SUSIE Lara No    Overview Signed 7/22/2021  9:29 AM by Loreto Elliott MD HSV on suppression, acyclovir switched to valacyclovir, to be continued after pregnancy    Anemia with iron twice daily

## 2021-08-05 ENCOUNTER — ROUTINE PRENATAL (OUTPATIENT)
Dept: OBGYN CLINIC | Facility: CLINIC | Age: 26
End: 2021-08-05

## 2021-08-05 VITALS
HEART RATE: 73 BPM | SYSTOLIC BLOOD PRESSURE: 96 MMHG | WEIGHT: 122 LBS | HEIGHT: 58 IN | BODY MASS INDEX: 25.61 KG/M2 | DIASTOLIC BLOOD PRESSURE: 61 MMHG

## 2021-08-05 DIAGNOSIS — Z3A.30 30 WEEKS GESTATION OF PREGNANCY: ICD-10-CM

## 2021-08-05 DIAGNOSIS — O99.013 ANEMIA AFFECTING PREGNANCY IN THIRD TRIMESTER: Primary | ICD-10-CM

## 2021-08-05 DIAGNOSIS — B37.3 VAGINAL YEAST INFECTION: ICD-10-CM

## 2021-08-05 PROBLEM — B37.31 VAGINAL YEAST INFECTION: Status: ACTIVE | Noted: 2021-08-05

## 2021-08-05 LAB
BV WHIFF TEST VAG QL: NEGATIVE
CLUE CELLS SPEC QL WET PREP: NEGATIVE
PH SMN: 4 [PH]
SL AMB POCT WET MOUNT: POSITIVE
T VAGINALIS VAG QL WET PREP: NEGATIVE
YEAST VAG QL WET PREP: POSITIVE

## 2021-08-05 PROCEDURE — 99213 OFFICE O/P EST LOW 20 MIN: CPT | Performed by: NURSE PRACTITIONER

## 2021-08-05 PROCEDURE — 87210 SMEAR WET MOUNT SALINE/INK: CPT | Performed by: NURSE PRACTITIONER

## 2021-08-19 ENCOUNTER — ROUTINE PRENATAL (OUTPATIENT)
Dept: OBGYN CLINIC | Facility: CLINIC | Age: 26
End: 2021-08-19

## 2021-08-19 VITALS
HEART RATE: 64 BPM | HEIGHT: 58 IN | WEIGHT: 125.8 LBS | SYSTOLIC BLOOD PRESSURE: 103 MMHG | DIASTOLIC BLOOD PRESSURE: 63 MMHG | BODY MASS INDEX: 26.41 KG/M2

## 2021-08-19 DIAGNOSIS — Z3A.32 32 WEEKS GESTATION OF PREGNANCY: Primary | ICD-10-CM

## 2021-08-19 DIAGNOSIS — A60.00 HERPES SIMPLEX INFECTION OF GENITOURINARY SYSTEM: ICD-10-CM

## 2021-08-19 PROCEDURE — 99214 OFFICE O/P EST MOD 30 MIN: CPT | Performed by: OBSTETRICS & GYNECOLOGY

## 2021-08-19 NOTE — PROGRESS NOTES
OB/GYN prenatal visit    S: 22 y o  [id number] 32w1d here for PN visit  She has no obstetric complaints, including pelvic pain, contractions, vaginal bleeding, loss of fluid, or decreased fetal movement  O:  Vitals:    21 1501   BP: 103/63   Pulse: 64       Gen: no acute distress, nonlabored breathing  Fundal Height (cm): 32 cm  Fetal Heart Rate: 140    A/P:  Herpes simplex infection of genitourinary system  - Currently on Valtrex for latent HSV    32 weeks gestation of pregnancy  - 28 week labs reviewed, Hgb 9 9 and 1 h Glucola normal    - Anemia recommend Iron PO BID every other day  - TDAP and flu given  - Encourage to receive COVID 19 vaccine  - Contraception:DEpo, LARC and OCP discussed, Will consider her options, Nexplanon vs OCP      IUP at 32w1d  No obstetric complaints today  TWG: + not charted (recommended weight gain 11-20lbs)  Flu vaccine no, TDAP vaccine yes  Contraception: OCP, if insurance cover she may consider Nexplanon  Breastfeeding: yes  Birth plan: yes about epidural  Discussed  labor precautions and fetal kick counts    Return to clinic in 2 weeks    COVID vaccine: I encourage the patient to receive COVIC vaccine as supported by ACOG and [facility]  Discussed  That pregnant patients are at increased risk for severe illness and complications from Conception Kisha compared to general population including hospitalizations, ICU care, intubation, ECMO and even death  Although vaccine studies did not include the pregnant women, to date there are no suggestions that vaccines should be contraindicated in pregnant women and date from [facility] SAVITA V safe registration does not show increase rate of adverse outcomes; including pregnancy loss, GDM, preeclampsia, FGR  Patient stated understanding and all questions were answered to her satisfaction      COVID 19 precautions were discussed with patient at length, reviewed symptoms, hygiene, social distancing, patient to call office  with questions/concerns    36 weeks: collect GBS/PCN allergy, check fetal position, contraception and birth plan, Francisco Hendrickson MD  3/13/4795  0:61 PM

## 2021-08-19 NOTE — ASSESSMENT & PLAN NOTE
- 28 week labs reviewed, Hgb 9 9 and 1 h Glucola normal    - Anemia recommend Iron PO BID every other day  - TDAP and flu given  - Encourage to receive COVID 19 vaccine  - Contraception:DEpo, LARC and OCP discussed, Will consider her options, Nexplanon vs OCP

## 2021-08-20 ENCOUNTER — APPOINTMENT (OUTPATIENT)
Dept: LAB | Facility: CLINIC | Age: 26
End: 2021-08-20

## 2021-08-20 ENCOUNTER — APPOINTMENT (EMERGENCY)
Dept: RADIOLOGY | Facility: HOSPITAL | Age: 26
End: 2021-08-20
Payer: COMMERCIAL

## 2021-08-20 ENCOUNTER — APPOINTMENT (EMERGENCY)
Dept: CT IMAGING | Facility: HOSPITAL | Age: 26
End: 2021-08-20
Payer: COMMERCIAL

## 2021-08-20 ENCOUNTER — HOSPITAL ENCOUNTER (EMERGENCY)
Facility: HOSPITAL | Age: 26
Discharge: HOME | End: 2021-08-20
Attending: EMERGENCY MEDICINE
Payer: COMMERCIAL

## 2021-08-20 VITALS
BODY MASS INDEX: 26.77 KG/M2 | OXYGEN SATURATION: 97 % | HEART RATE: 60 BPM | RESPIRATION RATE: 18 BRPM | WEIGHT: 125.88 LBS | TEMPERATURE: 98.3 F | DIASTOLIC BLOOD PRESSURE: 63 MMHG | SYSTOLIC BLOOD PRESSURE: 100 MMHG

## 2021-08-20 DIAGNOSIS — A60.00 HERPES SIMPLEX INFECTION OF GENITOURINARY SYSTEM: ICD-10-CM

## 2021-08-20 DIAGNOSIS — M54.50 LOW BACK PAIN: ICD-10-CM

## 2021-08-20 DIAGNOSIS — Z3A.32 32 WEEKS GESTATION OF PREGNANCY: ICD-10-CM

## 2021-08-20 DIAGNOSIS — R51.9 HEADACHE: Primary | ICD-10-CM

## 2021-08-20 DIAGNOSIS — R07.9 CHEST PAIN, UNSPECIFIED TYPE: ICD-10-CM

## 2021-08-20 LAB
ALBUMIN SERPL BCP-MCNC: 2.1 G/DL (ref 3.5–5)
ALP SERPL-CCNC: 404 U/L (ref 46–116)
ALT SERPL W P-5'-P-CCNC: 35 U/L (ref 12–78)
ANION GAP SERPL CALCULATED.3IONS-SCNC: 10 MMOL/L (ref 4–13)
AST SERPL W P-5'-P-CCNC: 45 U/L (ref 5–45)
BACTERIA UR QL AUTO: ABNORMAL /HPF
BASOPHILS # BLD AUTO: 0.05 THOUSANDS/ΜL (ref 0–0.1)
BASOPHILS NFR BLD AUTO: 1 % (ref 0–1)
BILIRUB SERPL-MCNC: 0.56 MG/DL (ref 0.2–1)
BILIRUB UR QL STRIP: ABNORMAL
BUN SERPL-MCNC: 6 MG/DL (ref 5–25)
CALCIUM ALBUM COR SERPL-MCNC: 10.1 MG/DL (ref 8.3–10.1)
CALCIUM SERPL-MCNC: 8.6 MG/DL (ref 8.3–10.1)
CHLORIDE SERPL-SCNC: 101 MMOL/L (ref 100–108)
CLARITY UR: ABNORMAL
CO2 SERPL-SCNC: 23 MMOL/L (ref 21–32)
COLOR UR: ABNORMAL
CREAT SERPL-MCNC: 0.51 MG/DL (ref 0.6–1.3)
D DIMER PPP FEU-MCNC: 3.77 UG/ML FEU
EOSINOPHIL # BLD AUTO: 0.05 THOUSAND/ΜL (ref 0–0.61)
EOSINOPHIL NFR BLD AUTO: 1 % (ref 0–6)
ERYTHROCYTE [DISTWIDTH] IN BLOOD BY AUTOMATED COUNT: 14.4 % (ref 11.6–15.1)
GFR SERPL CREATININE-BSD FRML MDRD: 134 ML/MIN/1.73SQ M
GLUCOSE SERPL-MCNC: 85 MG/DL (ref 65–140)
GLUCOSE UR STRIP-MCNC: NEGATIVE MG/DL
HCT VFR BLD AUTO: 34.1 % (ref 34.8–46.1)
HGB BLD-MCNC: 10.7 G/DL (ref 11.5–15.4)
HGB UR QL STRIP.AUTO: NEGATIVE
IMM GRANULOCYTES # BLD AUTO: 0.12 THOUSAND/UL (ref 0–0.2)
IMM GRANULOCYTES NFR BLD AUTO: 1 % (ref 0–2)
KETONES UR STRIP-MCNC: NEGATIVE MG/DL
LEUKOCYTE ESTERASE UR QL STRIP: NEGATIVE
LYMPHOCYTES # BLD AUTO: 1.87 THOUSANDS/ΜL (ref 0.6–4.47)
LYMPHOCYTES NFR BLD AUTO: 20 % (ref 14–44)
MCH RBC QN AUTO: 24.9 PG (ref 26.8–34.3)
MCHC RBC AUTO-ENTMCNC: 31.4 G/DL (ref 31.4–37.4)
MCV RBC AUTO: 79 FL (ref 82–98)
MONOCYTES # BLD AUTO: 0.54 THOUSAND/ΜL (ref 0.17–1.22)
MONOCYTES NFR BLD AUTO: 6 % (ref 4–12)
MUCOUS THREADS UR QL AUTO: ABNORMAL
NEUTROPHILS # BLD AUTO: 6.65 THOUSANDS/ΜL (ref 1.85–7.62)
NEUTS SEG NFR BLD AUTO: 71 % (ref 43–75)
NITRITE UR QL STRIP: NEGATIVE
NON-SQ EPI CELLS URNS QL MICRO: ABNORMAL /HPF
NRBC BLD AUTO-RTO: 0 /100 WBCS
PH UR STRIP.AUTO: 7 [PH] (ref 4.5–8)
PLATELET # BLD AUTO: 320 THOUSANDS/UL (ref 149–390)
PMV BLD AUTO: 9.3 FL (ref 8.9–12.7)
POTASSIUM SERPL-SCNC: 4 MMOL/L (ref 3.5–5.3)
PROT SERPL-MCNC: 7.4 G/DL (ref 6.4–8.2)
PROT UR STRIP-MCNC: ABNORMAL MG/DL
RBC # BLD AUTO: 4.3 MILLION/UL (ref 3.81–5.12)
RBC #/AREA URNS AUTO: ABNORMAL /HPF
SODIUM SERPL-SCNC: 134 MMOL/L (ref 136–145)
SP GR UR STRIP.AUTO: 1.02 (ref 1–1.03)
TROPONIN I SERPL-MCNC: <0.02 NG/ML
UROBILINOGEN UR QL STRIP.AUTO: 1 E.U./DL
WBC # BLD AUTO: 9.28 THOUSAND/UL (ref 4.31–10.16)
WBC #/AREA URNS AUTO: ABNORMAL /HPF

## 2021-08-20 PROCEDURE — 84484 ASSAY OF TROPONIN QUANT: CPT

## 2021-08-20 PROCEDURE — 87086 URINE CULTURE/COLONY COUNT: CPT

## 2021-08-20 PROCEDURE — 99285 EMERGENCY DEPT VISIT HI MDM: CPT

## 2021-08-20 PROCEDURE — G1004 CDSM NDSC: HCPCS

## 2021-08-20 PROCEDURE — 36415 COLL VENOUS BLD VENIPUNCTURE: CPT

## 2021-08-20 PROCEDURE — 80053 COMPREHEN METABOLIC PANEL: CPT

## 2021-08-20 PROCEDURE — 82240 BILE ACIDS CHOLYLGLYCINE: CPT

## 2021-08-20 PROCEDURE — 71275 CT ANGIOGRAPHY CHEST: CPT

## 2021-08-20 PROCEDURE — 85025 COMPLETE CBC W/AUTO DIFF WBC: CPT

## 2021-08-20 PROCEDURE — 99285 EMERGENCY DEPT VISIT HI MDM: CPT | Performed by: EMERGENCY MEDICINE

## 2021-08-20 PROCEDURE — 81001 URINALYSIS AUTO W/SCOPE: CPT

## 2021-08-20 PROCEDURE — 71045 X-RAY EXAM CHEST 1 VIEW: CPT

## 2021-08-20 PROCEDURE — 85379 FIBRIN DEGRADATION QUANT: CPT

## 2021-08-20 PROCEDURE — 87147 CULTURE TYPE IMMUNOLOGIC: CPT

## 2021-08-20 PROCEDURE — 93005 ELECTROCARDIOGRAM TRACING: CPT

## 2021-08-20 RX ORDER — ACETAMINOPHEN 325 MG/1
650 TABLET ORAL ONCE
Status: COMPLETED | OUTPATIENT
Start: 2021-08-20 | End: 2021-08-20

## 2021-08-20 RX ADMIN — ACETAMINOPHEN 650 MG: 325 TABLET, FILM COATED ORAL at 13:51

## 2021-08-20 RX ADMIN — IOHEXOL 85 ML: 350 INJECTION, SOLUTION INTRAVENOUS at 12:56

## 2021-08-20 NOTE — ED PROVIDER NOTES
History  Chief Complaint   Patient presents with    Shortness of Breath     PT presents to ED 28 weeks pregnant with c/o "respiratory problem" symptoms include "SOB when I go to sleep and pain in back with breathing for the last 2 days " (L&D notified and requested eval by ED first)     21 y/o female no PMH, A1 at 32w3d p/w 2 days of substernal CP and back pain w/ intermittent SOB  7/10 headache since yesterday evening  Pt states she started to have sharp pain in the middle of her chest that started at rest associated w/ SOB and pain in her middle of her back thoracic/upper lumbar spine  Pain comes and goes, worse when she is having the SOB  No cough  Did have some nausea w/o vomiting last night  Last night started to have 7/10 generalized headache that kept her up at night  Has generalized itchiness x1 week  No cough, vomiting, recent travel, recent sickness, covid exposures  Not vaccinated against covid  Pregnancy thus far has been uneventful w/ normal prenatal visits   used: Yes        Prior to Admission Medications   Prescriptions Last Dose Informant Patient Reported? Taking?    Prenatal Vit-Fe Fumarate-FA (Prenatal Complete) 14-0 4 MG TABS  Self No No   Sig: Take 1 tablet by mouth daily   doxylamine (UNISON) 25 MG tablet  Self No No   Sig: Take 0 5 tablets (12 5 mg total) by mouth daily at bedtime as needed for nausea   ferrous sulfate 324 (65 Fe) mg   No No   Sig: Take 1 tablet (324 mg total) by mouth 2 (two) times a day before meals   pyridoxine (B-6) 25 MG tablet  Self No No   Sig: Take 1 tablet (25 mg total) by mouth daily   valACYclovir (VALTREX) 500 mg tablet   No No   Sig: Take 1 tablet (500 mg total) by mouth 2 (two) times a day      Facility-Administered Medications: None       Past Medical History:   Diagnosis Date    Patient denies medical problems        Past Surgical History:   Procedure Laterality Date    NO PAST SURGERIES         Family History   Problem Relation Age of Onset    No Known Problems Mother     No Known Problems Father     No Known Problems Brother     No Known Problems Daughter     No Known Problems Brother     No Known Problems Brother     No Known Problems Maternal Grandmother     No Known Problems Maternal Grandfather     No Known Problems Daughter      I have reviewed and agree with the history as documented  E-Cigarette/Vaping    E-Cigarette Use Never User      E-Cigarette/Vaping Substances    Nicotine No     THC No     CBD No     Flavoring No     Other No     Unknown No      Social History     Tobacco Use    Smoking status: Never Smoker    Smokeless tobacco: Never Used   Vaping Use    Vaping Use: Never used   Substance Use Topics    Alcohol use: Not Currently     Comment: socially    Drug use: No        Review of Systems   Constitutional: Negative for activity change, chills, diaphoresis, fever and unexpected weight change  HENT: Negative for postnasal drip and rhinorrhea  Eyes: Negative for visual disturbance  Respiratory: Positive for shortness of breath  Negative for cough and chest tightness  Cardiovascular: Positive for chest pain  Negative for palpitations  Gastrointestinal: Positive for nausea  Negative for abdominal pain, blood in stool, constipation, diarrhea and vomiting  Endocrine: Negative for cold intolerance and heat intolerance  Genitourinary: Negative for difficulty urinating, dysuria and hematuria  Musculoskeletal: Positive for back pain  Skin: Negative for color change and wound  Neurological: Negative for dizziness and syncope  Psychiatric/Behavioral: Negative for dysphoric mood  The patient is not nervous/anxious  All other systems reviewed and are negative        Physical Exam  ED Triage Vitals [08/20/21 0923]   Temperature Pulse Respirations Blood Pressure SpO2   98 3 °F (36 8 °C) 76 18 114/53 97 %      Temp Source Heart Rate Source Patient Position - Orthostatic VS BP Location FiO2 (%)   Oral Monitor Sitting Left arm --      Pain Score       --             Orthostatic Vital Signs  Vitals:    08/20/21 0923 08/20/21 1211   BP: 114/53 100/63   Pulse: 76 60   Patient Position - Orthostatic VS: Sitting Lying       Physical Exam  Vitals and nursing note reviewed  Constitutional:       General: She is not in acute distress  Appearance: Normal appearance  She is well-developed  She is not ill-appearing, toxic-appearing or diaphoretic  HENT:      Head: Normocephalic and atraumatic  Right Ear: External ear normal       Left Ear: External ear normal       Nose: Nose normal       Mouth/Throat:      Mouth: Mucous membranes are moist    Eyes:      General: No scleral icterus  Extraocular Movements: Extraocular movements intact  Conjunctiva/sclera: Conjunctivae normal    Cardiovascular:      Rate and Rhythm: Normal rate and regular rhythm  Pulses: Normal pulses  Heart sounds: Normal heart sounds  No murmur heard  Pulmonary:      Effort: Pulmonary effort is normal  No respiratory distress  Breath sounds: Normal breath sounds  No stridor  No wheezing  Abdominal:      General: Abdomen is protuberant  Palpations: Abdomen is soft  Tenderness: There is no abdominal tenderness  Comments: 3rd trimester gravid uterus   Musculoskeletal:         General: Normal range of motion  Cervical back: Normal range of motion  Skin:     General: Skin is warm and dry  Coloration: Skin is not jaundiced  Neurological:      General: No focal deficit present  Mental Status: She is alert and oriented to person, place, and time     Psychiatric:         Mood and Affect: Mood normal          ED Medications  Medications   iohexol (OMNIPAQUE) 350 MG/ML injection (SINGLE-DOSE) 85 mL (85 mL Intravenous Given 8/20/21 1256)   acetaminophen (TYLENOL) tablet 650 mg (650 mg Oral Given 8/20/21 1351)       Diagnostic Studies  Results Reviewed     Procedure Component Value Units Date/Time    Urine Microscopic [[id number]]  (Abnormal) Collected: 08/20/21 1137    Lab Status: Final result Specimen: Urine, Clean Catch Updated: 08/20/21 1214     RBC, UA 0-1 /hpf      WBC, UA 0-1 /hpf      Epithelial Cells Moderate /hpf      Bacteria, UA Occasional /hpf      MUCUS THREADS Occasional    Troponin I [[id number]]  (Normal) Collected: 08/20/21 1125    Lab Status: Final result Specimen: Blood from Arm, Left Updated: 08/20/21 1206     Troponin I <0 02 ng/mL     Comprehensive metabolic panel [[id number]]  (Abnormal) Collected: 08/20/21 1125    Lab Status: Final result Specimen: Blood from Arm, Left Updated: 08/20/21 1205     Sodium 134 mmol/L      Potassium 4 0 mmol/L      Chloride 101 mmol/L      CO2 23 mmol/L      ANION GAP 10 mmol/L      BUN 6 mg/dL      Creatinine 0 51 mg/dL      Glucose 85 mg/dL      Calcium 8 6 mg/dL      Corrected Calcium 10 1 mg/dL      AST 45 U/L      ALT 35 U/L      Alkaline Phosphatase 404 U/L      Total Protein 7 4 g/dL      Albumin 2 1 g/dL      Total Bilirubin 0 56 mg/dL      eGFR 134 ml/min/1 73sq m     Narrative:      [facility] guidelines for Chronic Kidney Disease (CKD):     Stage 1 with normal or high GFR (GFR > 90 mL/min/1 73 square meters)    Stage 2 Mild CKD (GFR = 60-89 mL/min/1 73 square meters)    Stage 3A Moderate CKD (GFR = 45-59 mL/min/1 73 square meters)    Stage 3B Moderate CKD (GFR = 30-44 mL/min/1 73 square meters)    Stage 4 Severe CKD (GFR = 15-29 mL/min/1 73 square meters)    Stage 5 End Stage CKD (GFR <15 mL/min/1 73 square meters)  Note: GFR calculation is accurate only with a steady state creatinine    D-Dimer [[id number]]  (Abnormal) Collected: 08/20/21 1125    Lab Status: Final result Specimen: Blood from Arm, Left Updated: 08/20/21 1203     D-Dimer, Quant 3 77 ug/ml FEU     Urine culture [[id number]] Collected: 08/20/21 1137    Lab Status:  In process Specimen: Urine, Clean Catch Updated: 08/20/21 1154    Urine Macroscopic, POC [[id number]]  (Abnormal) Collected: 08/20/21 1137    Lab Status: Final result Specimen: Urine Updated: 08/20/21 1138     Color, UA Miriam     Clarity, UA Slightly Cloudy     pH, UA 7 0     Leukocytes, UA Negative     Nitrite, UA Negative     Protein, UA Trace mg/dl      Glucose, UA Negative mg/dl      Ketones, UA Negative mg/dl      Urobilinogen, UA 1 0 E U /dl      Bilirubin, UA Interference- unable to analyze     Blood, UA Negative     Specific Gravity, UA 1 025    Narrative:      CLINITEK RESULT    CBC and differential [[id number]]  (Abnormal) Collected: 08/20/21 1125    Lab Status: Final result Specimen: Blood from Arm, Left Updated: 08/20/21 1136     WBC 9 28 Thousand/uL      RBC 4 30 Million/uL      Hemoglobin 10 7 g/dL      Hematocrit 34 1 %      MCV 79 fL      MCH 24 9 pg      MCHC 31 4 g/dL      RDW 14 4 %      MPV 9 3 fL      Platelets 788 Thousands/uL      nRBC 0 /100 WBCs      Neutrophils Relative 71 %      Immat GRANS % 1 %      Lymphocytes Relative 20 %      Monocytes Relative 6 %      Eosinophils Relative 1 %      Basophils Relative 1 %      Neutrophils Absolute 6 65 Thousands/µL      Immature Grans Absolute 0 12 Thousand/uL      Lymphocytes Absolute 1 87 Thousands/µL      Monocytes Absolute 0 54 Thousand/µL      Eosinophils Absolute 0 05 Thousand/µL      Basophils Absolute 0 05 Thousands/µL                  CTA ED chest PE study   Final Result by Yaritza Linares MD (08/20 1319)      No pulmonary embolus      No acute pulmonary disease  5 mm nonobstructing calcification in the upper pole of the left kidney  Workstation performed: [id number]         XR chest 1 view portable   Final Result by Yaritza Linares MD (08/20 1124)      No acute cardiopulmonary disease                    Workstation performed: [id number]               Procedures  ECG 12 Lead Documentation Only    Date/Time: 8/20/2021 10:50 AM  Performed by: Mae Spivey MD  Authorized by: Mae Spivey MD     ECG reviewed by me, the ED Provider: yes    Patient location:  ED  Previous ECG:     Previous ECG:  Unavailable  Interpretation:     Interpretation: normal    Rate:     ECG rate:  68    ECG rate assessment: normal    Rhythm:     Rhythm: sinus rhythm    Ectopy:     Ectopy: none    QRS:     QRS axis:  Normal    QRS intervals:  Normal  Conduction:     Conduction: normal    ST segments:     ST segments:  Normal  T waves:     T waves: normal            ED Course  ED Course as of Aug 20 1407   Fri Aug 20, 2021   1129 Cxr no acute path   XR chest 1 view portable   1221 D-Dimer, Quant(!): 3 77   1235 Spoke w/ pt via  [id number] regarding the elevated ddimer and the need for chest CTA to further evaluate for PE  I explained the risks and benefits of getting a CT scan at this point in pregnancy  I answered her questions and pt signed a consent form using the       1324 No PE or other acute pathology   CTA ED chest PE study                                       MDM  Number of Diagnoses or Management Options  Chest pain, unspecified type  Headache  Low back pain  Diagnosis management comments: Initial Impression: ACS unlikely given age and that the pain is intermittent  PE is possible given pain associated w/ SOB, pregnancy can increase risk, however sxs are intermittent, vital signs normal, no cough  Preeclampsia consistent w/ sxs especially head pain, but normotensive  Cholecystitis possible given stage of pregnancy and complaints of itchiness    Initial Work Up: lab work including cmp, trop, ddimer  Cxr, CTA    Final Impression: Chest pain on unclear significance  ECG, CTA, all lab work, comes back negative   She's stable for d/c and will f/u w/ her OB        Disposition  Final diagnoses:   Headache   Chest pain, unspecified type   Low back pain     Time reflects when diagnosis was documented in both MDM as applicable and the Disposition within this note     Time User Action Codes Description Comment 8/20/2021  1:59 PM De Ba Add [R51 9] Headache     8/20/2021  1:59 PM Prerna SULLIVAN Add [R07 9] Chest pain, unspecified type     8/20/2021  2:00 PM De Ba Add [M54 5] Low back pain       ED Disposition     ED Disposition Condition Date/Time Comment    Discharge Stable Fri Aug 20, 2021  1:59 PM Fany Tolentinotun discharge to home/self care  Follow-up Information    None         Patient's Medications   Discharge Prescriptions    No medications on file     No discharge procedures on file  PDMP Review     None           ED Provider  Attending physically available and evaluated Rose HOLLIDAY managed the patient along with the ED Attending      Electronically Signed by         Debbie Pa MD  08/20/21 5551

## 2021-08-20 NOTE — ED NOTES
Provider at bedside,doing ABD 1864 Alcira López, RN  08/20/21 2405 [facility] India Aaron RN  08/20/21 1043

## 2021-08-21 ENCOUNTER — HOSPITAL ENCOUNTER (INPATIENT)
Facility: HOSPITAL | Age: 26
LOS: 2 days | Discharge: HOME | DRG: 560 | End: 2021-08-23
Attending: OBSTETRICS & GYNECOLOGY | Admitting: OBSTETRICS & GYNECOLOGY
Payer: COMMERCIAL

## 2021-08-21 LAB
ABO GROUP BLD: NORMAL
BACTERIA UR CULT: ABNORMAL
BACTERIA UR CULT: ABNORMAL
BASE EXCESS BLDCOA CALC-SCNC: -10.8 MMOL/L (ref 3–11)
BASE EXCESS BLDCOV CALC-SCNC: -9.8 MMOL/L (ref 1–9)
BILE AC SERPL-SCNC: 46.7 UMOL/L (ref 0–10)
BLD GP AB SCN SERPL QL: NEGATIVE
ERYTHROCYTE [DISTWIDTH] IN BLOOD BY AUTOMATED COUNT: 14.5 % (ref 11.6–15.1)
HCO3 BLDCOA-SCNC: 22.4 MMOL/L (ref 17.3–27.3)
HCO3 BLDCOV-SCNC: 22.6 MMOL/L (ref 12.2–28.6)
HCT VFR BLD AUTO: 35.8 % (ref 34.8–46.1)
HGB BLD-MCNC: 11.4 G/DL (ref 11.5–15.4)
HOLD SPECIMEN: NORMAL
MCH RBC QN AUTO: 25 PG (ref 26.8–34.3)
MCHC RBC AUTO-ENTMCNC: 31.8 G/DL (ref 31.4–37.4)
MCV RBC AUTO: 79 FL (ref 82–98)
O2 CT VFR BLDCOA CALC: 2.3 ML/DL
OXYHGB MFR BLDCOA: 10.5 %
OXYHGB MFR BLDCOV: 5.4 %
PCO2 BLDCOA: 89.1 MM[HG] (ref 30–60)
PCO2 BLDCOV: 83.7 MM HG (ref 27–43)
PH BLDCOA: 7.02 [PH] (ref 7.23–7.43)
PH BLDCOV: 7.05 [PH] (ref 7.19–7.49)
PLATELET # BLD AUTO: 323 THOUSANDS/UL (ref 149–390)
PMV BLD AUTO: 10.2 FL (ref 8.9–12.7)
PO2 BLDCOA: 10.9 MM HG (ref 5–25)
PO2 BLDCOV: <10 MM HG (ref 15–45)
RBC # BLD AUTO: 4.56 MILLION/UL (ref 3.81–5.12)
RH BLD: POSITIVE
SPECIMEN EXPIRATION DATE: NORMAL
WBC # BLD AUTO: 9.01 THOUSAND/UL (ref 4.31–10.16)

## 2021-08-21 PROCEDURE — 86592 SYPHILIS TEST NON-TREP QUAL: CPT | Performed by: STUDENT IN AN ORGANIZED HEALTH CARE EDUCATION/TRAINING PROGRAM

## 2021-08-21 PROCEDURE — 86900 BLOOD TYPING SEROLOGIC ABO: CPT | Performed by: STUDENT IN AN ORGANIZED HEALTH CARE EDUCATION/TRAINING PROGRAM

## 2021-08-21 PROCEDURE — 86901 BLOOD TYPING SEROLOGIC RH(D): CPT | Performed by: STUDENT IN AN ORGANIZED HEALTH CARE EDUCATION/TRAINING PROGRAM

## 2021-08-21 PROCEDURE — 99215 OFFICE O/P EST HI 40 MIN: CPT

## 2021-08-21 PROCEDURE — 4A1HXCZ MONITORING OF PRODUCTS OF CONCEPTION, CARDIAC RATE, EXTERNAL APPROACH: ICD-10-PCS | Performed by: OBSTETRICS & GYNECOLOGY

## 2021-08-21 PROCEDURE — NC001 PR NO CHARGE: Performed by: OBSTETRICS & GYNECOLOGY

## 2021-08-21 PROCEDURE — 88307 TISSUE EXAM BY PATHOLOGIST: CPT | Performed by: PATHOLOGY

## 2021-08-21 PROCEDURE — 82805 BLOOD GASES W/O2 SATURATION: CPT | Performed by: OBSTETRICS & GYNECOLOGY

## 2021-08-21 PROCEDURE — 86850 RBC ANTIBODY SCREEN: CPT | Performed by: STUDENT IN AN ORGANIZED HEALTH CARE EDUCATION/TRAINING PROGRAM

## 2021-08-21 PROCEDURE — 59409 OBSTETRICAL CARE: CPT | Performed by: OBSTETRICS & GYNECOLOGY

## 2021-08-21 PROCEDURE — 85027 COMPLETE CBC AUTOMATED: CPT | Performed by: STUDENT IN AN ORGANIZED HEALTH CARE EDUCATION/TRAINING PROGRAM

## 2021-08-21 RX ORDER — DIAPER,BRIEF,INFANT-TODD,DISP
1 EACH MISCELLANEOUS 4 TIMES DAILY PRN
Status: DISCONTINUED | OUTPATIENT
Start: 2021-08-21 | End: 2021-08-23 | Stop reason: HOSPADM

## 2021-08-21 RX ORDER — OXYTOCIN/RINGER'S LACTATE 30/500 ML
PLASTIC BAG, INJECTION (ML) INTRAVENOUS
Status: COMPLETED
Start: 2021-08-21 | End: 2021-08-21

## 2021-08-21 RX ORDER — KETOROLAC TROMETHAMINE 30 MG/ML
15 INJECTION, SOLUTION INTRAMUSCULAR; INTRAVENOUS EVERY 6 HOURS PRN
Status: DISPENSED | OUTPATIENT
Start: 2021-08-21 | End: 2021-08-22

## 2021-08-21 RX ORDER — DOCUSATE SODIUM 100 MG/1
100 CAPSULE, LIQUID FILLED ORAL 2 TIMES DAILY
Status: DISCONTINUED | OUTPATIENT
Start: 2021-08-21 | End: 2021-08-23 | Stop reason: HOSPADM

## 2021-08-21 RX ORDER — VALACYCLOVIR HYDROCHLORIDE 500 MG/1
500 TABLET, FILM COATED ORAL 2 TIMES DAILY
Status: DISCONTINUED | OUTPATIENT
Start: 2021-08-21 | End: 2021-08-23 | Stop reason: HOSPADM

## 2021-08-21 RX ORDER — OXYTOCIN/RINGER'S LACTATE 30/500 ML
1-30 PLASTIC BAG, INJECTION (ML) INTRAVENOUS ONCE
Status: DISCONTINUED | OUTPATIENT
Start: 2021-08-21 | End: 2021-08-23 | Stop reason: HOSPADM

## 2021-08-21 RX ORDER — SENNOSIDES 8.6 MG
1 TABLET ORAL
Status: DISCONTINUED | OUTPATIENT
Start: 2021-08-21 | End: 2021-08-23 | Stop reason: HOSPADM

## 2021-08-21 RX ORDER — IBUPROFEN 600 MG/1
600 TABLET ORAL EVERY 6 HOURS PRN
Status: DISCONTINUED | OUTPATIENT
Start: 2021-08-22 | End: 2021-08-23 | Stop reason: HOSPADM

## 2021-08-21 RX ORDER — SIMETHICONE 80 MG
80 TABLET,CHEWABLE ORAL 4 TIMES DAILY PRN
Status: DISCONTINUED | OUTPATIENT
Start: 2021-08-21 | End: 2021-08-23 | Stop reason: HOSPADM

## 2021-08-21 RX ORDER — ACETAMINOPHEN 325 MG/1
650 TABLET ORAL EVERY 4 HOURS PRN
Status: DISCONTINUED | OUTPATIENT
Start: 2021-08-21 | End: 2021-08-23 | Stop reason: HOSPADM

## 2021-08-21 RX ORDER — ONDANSETRON 2 MG/ML
4 INJECTION INTRAMUSCULAR; INTRAVENOUS EVERY 6 HOURS PRN
Status: DISCONTINUED | OUTPATIENT
Start: 2021-08-21 | End: 2021-08-23 | Stop reason: HOSPADM

## 2021-08-21 RX ORDER — CALCIUM CARBONATE 200(500)MG
1000 TABLET,CHEWABLE ORAL DAILY PRN
Status: DISCONTINUED | OUTPATIENT
Start: 2021-08-21 | End: 2021-08-23 | Stop reason: HOSPADM

## 2021-08-21 RX ADMIN — KETOROLAC TROMETHAMINE 15 MG: 30 INJECTION, SOLUTION INTRAMUSCULAR at 15:36

## 2021-08-21 RX ADMIN — VALACYCLOVIR HYDROCHLORIDE 500 MG: 500 TABLET, FILM COATED ORAL at 19:45

## 2021-08-21 RX ADMIN — Medication 30 UNITS: at 14:12

## 2021-08-21 RX ADMIN — DOCUSATE SODIUM 100 MG: 100 CAPSULE ORAL at 19:46

## 2021-08-21 RX ADMIN — ACETAMINOPHEN 650 MG: 325 TABLET, FILM COATED ORAL at 19:46

## 2021-08-21 NOTE — H&P
H&P Exam - Obstetrics   Fanygeovain Kwan Ajtun 22 y o  female MRN: [id number]  Unit/Bed#: LD TRIAGE  Encounter: [id number]      History of Present Illness     Chief Complaint: Active labor    HPI:  Gail Richardson is a 22 y o  [id number] female with an MALIA of 10/13/2021, by Ultrasound at 32w3d weeks gestation who is being admitted for delivery  She arrived with complaint of vaginal bleeding  She was found to be complete and breech with fetal heart tones in the 70s  STAT  was called, however patient delivered faster than the OR was ready  Please see the delivery note for more details  [facility]  was used for her entire delivery    PREGNANCY COMPLICATIONS:   1) Anemia  2) GBS positive status  3) Genital herpes    OB History    Para Term  AB Living   4 2 2   1 2   SAB TAB Ectopic Multiple Live Births   1       2      # Outcome Date GA Lbr Neal/2nd Weight Sex Delivery Anes PTL Lv   4 Current            3 SAB 10/2019           2 Term 11 40w0d   F Vag-Spont None  KATH   1 Term 03/28/10    F Vag-Spont None  KATH       Baby complications/comments: Lea IUP, breech    Review of Systems   Constitutional: Negative  HENT: Negative  Eyes: Negative  Respiratory: Negative  Cardiovascular: Negative  Gastrointestinal: Negative  Endocrine: Negative  Genitourinary: Positive for pelvic pain and vaginal bleeding  Musculoskeletal: Negative  Skin: Negative  Neurological: Negative  Psychiatric/Behavioral: Negative            Historical Information   Past Medical History:   Diagnosis Date    Patient denies medical problems      Past Surgical History:   Procedure Laterality Date    NO PAST SURGERIES       Social History   Social History     Substance and Sexual Activity   Alcohol Use Not Currently    Comment: socially     Social History     Substance and Sexual Activity   Drug Use No     Social History     Tobacco Use   Smoking Status Never Smoker Smokeless Tobacco Never Used     Family History: non-contributory    Meds/Allergies      Medications Prior to Admission   Medication    doxylamine (UNISON) 25 MG tablet    ferrous sulfate 324 (65 Fe) mg    Prenatal Vit-Fe Fumarate-FA (Prenatal Complete) 14-0 4 MG TABS    pyridoxine (B-6) 25 MG tablet    valACYclovir (VALTREX) 500 mg tablet      No Known Allergies    OBJECTIVE:    Vitals: unknown if currently breastfeeding  There is no height or weight on file to calculate BMI  Physical Exam  Constitutional:       General: She is not in acute distress  Appearance: She is well-developed  She is not diaphoretic  HENT:      Head: Normocephalic and atraumatic  Eyes:      Pupils: Pupils are equal, round, and reactive to light  Neck:      Trachea: No tracheal deviation  Pulmonary:      Effort: Pulmonary effort is normal  No respiratory distress  Breath sounds: Normal breath sounds  No stridor  Abdominal:      General: Bowel sounds are normal  There is no distension  Palpations: Abdomen is soft  There is no mass  Tenderness: There is abdominal tenderness  There is no guarding or rebound  Genitourinary:     General: Normal vulva  Comments: 10/100/+1 with intact membranes at first evaluation  Musculoskeletal:         General: No tenderness or deformity  Normal range of motion  Cervical back: Normal range of motion  Skin:     General: Skin is warm and dry  Coloration: Skin is not pale  Findings: No erythema or rash  Neurological:      Mental Status: She is alert and oriented to person, place, and time        Coordination: Coordination normal            Grossly ruptured for meconium    Cervix:   10/100/+1    Fetal heart rate:    Baseline 70s     [place]:    N/A    GBS: positive    Prenatal Labs:   Blood Type:   Lab Results   Component Value Date/Time    ABO Grouping O 03/25/2021 03:01 PM     , D (Rh type):   Lab Results   Component Value Date/Time    Rh Factor Positive 2021 03:01 PM     , Antibody Screen: No results found for: ANTIBODYSCR , HCT/HGB:   Lab Results   Component Value Date/Time    Hematocrit 34 1 (L) 2021 11:25 AM    Hemoglobin 10 7 (L) 2021 11:25 AM      , MCV:   Lab Results   Component Value Date/Time    MCV 79 (L) 2021 11:25 AM      , Platelets:   Lab Results   Component Value Date/Time    Platelets 139  11:25 AM      , 1 hour Glucola:   Lab Results   Component Value Date/Time    Glucose 116 2021 10:47 AM   , 3 hour GTT: No results found for: BZMGVAW4SA, Varicella:   Lab Results   Component Value Date/Time    Varicella IgG IMMUNE 2021 03:01 PM       , Rubella:   Lab Results   Component Value Date/Time    Rubella IgG Quant 28 6 2021 03:01 PM        , VDRL/RPR:   Lab Results   Component Value Date/Time    RPR Non-Reactive 2021 03:04 PM      , Urine Culture/Screen:   Lab Results   Component Value Date/Time    Urine Culture (A) 2021 11:37 AM     10,000-19,000 cfu/ml Beta Hemolytic Streptococcus Group B    Urine Culture <10,000 cfu/ml Lactobacillus species (A) 2021 11:37 AM       , Urine Drug Screen: No results found for: AMPHETUR, BARBTUR, BDZUR, THCUR, COCAINEUR, METHADONEUR, OPIATEUR, PCPUR, MTHAMUR, ECSTASYUR, TRICYCLICSUR, Hep B:   Lab Results   Component Value Date/Time    Hepatitis B Surface Ag Non-reactive 2021 03:01 PM     , Hep C: No components found for: HEPCSAG, EXTHEPCSAG   , HIV:   Lab Results   Component Value Date/Time    HIV-1/HIV-2 Ab Non-Reactive 2021 03:01 PM     , Chlamydia: No results found for: EXTCHLAMYDIA  , Gonorrhea:   Lab Results   Component Value Date/Time    N gonorrhoeae, DNA Probe Negative 2021 02:09 PM     , Group B Strep:  No results found for: STREPGRPB       Invasive Devices     None                   Assessment/Plan     ASSESSMENT:  26yo  at 32w3d weeks gestation who is being admitted for breech delivery in triage      PLAN:   1) Admit   2) CBC, RPR, Blood Type    This patient will be an INPATIENT  and I certify the anticipated length of stay is >2 Midnights      Angela Manning MD  OBGYN PGY-4  8/21/2021  2:19 PM

## 2021-08-21 NOTE — DISCHARGE INSTRUCTIONS
Parto vaginal   LO QUE USTED DEBE SABER:   El parto vaginal se produce cuando el bebé nace a través de la vagina (canal del parto)  DESPUÉS DE SER DADO DE JOSEPH:   Medicamentos:   · Los antiiflamatorios no esteroideos (AINEs) ayudan a reducir inflamación y dolor o fiebre  Janet medicamento esta disponible con o sin arnold receta médica  Los AINEs podrían causar sangrado estomacal o problemas en los riñones en [employer]  Si usted esta tomando un anticoágulante, siempre  pregunte a marshall proveedor de izabel[place] si los AINEs son seguros para usted  Antes de [employer], dulce siempre la etiqueta de información y siga cyrus indicaciones  · [place] cyrus medicamentos aristeo se le haya indicado  Llame a marshall proveedor de izabela si usted piensa que marshall medicamento no le está ayudando o si tiene efectos secundarios  Infórmele si es alérgico a cualquier medicamento  Mantenga arnold lista vigente de los medicamentos, vitaminas, y hierbas que tomas  [address] cantidades, la frecuencia con que los tomas y por qué los tomas  Traiga la lista o los envases de cyrus medicamentos a las citas de seguimiento  Mantenga la lista consigo en flakita de arnold emergencia  Bote las listas [place]  Programe arnold tiarra con marshall médico de cabecera:  Zoraida Estimable de las mujeres necesitan regresar donde marshall médico 6 semanas después de un parto vaginal  Pregunte sobre cómo cuidar de cyrus heridas o suturas  Anote cyrus preguntas para que recuerde hacerlas delores cyrus citas  Actividad:  Descanse siempre que le sea posible  Trate de minimizar cyrus actividades  Usted podría empezar a hacer algo de ejercicio poco tiempo después de tener a marshall bebé  Hable con marshall médico de cabecera antes de empezar a ejercitarse  Si usted trabaja fuera del hogar, pregunte también cuándo debe regresar a marshall trabajo  Ejercicios de Kegel:  Los ejercicios de Kegel podrían ayudar a que cyrus músculos vaginales y rectales sanen más rápido   Para hacer éstos ejercicios, flexione y relaje los músculos alrededor de marshall vagina  Los ejercicios de Kegel fortalecen los músculos  Cuidado de los senos:  Cuando le baja la [place], cyrus senos pueden sentirse llenos y duros  Pídale más información a marshall médico acerca de cómo cuidar cyrus senos, aunque no esté amamantando a marshall bebé  Estreñimiento: No dimple esfuerzo para realizar arnold evacuación intestinal si esta teniendo dificultad para hacerlo  Alimentos altos en [place], [facility] orleans líquido, y el ejercicio regular puede ayudar a prevenir estreñimiento  Ejemplos de estos alimentos incluyen, frutas y afrecho  El [facility] de [place] and [facility] y agua son Odessia Fetch buenos líquidos para teri  El ejercicio regular ayuda con la función de marshall sistema digestivo  Es posible que usted también tenga que usar fibra que se puede obtener sin [place] y laxantes  [employer] se le haya indicado  Hemorroides:  El embarazo puede causar hemorroides graves  Es posible que usted tenga dolor en el recto a causa de las hemorroides  Pregúntele acerca del cuidado de las hemorroides  Cuidado del perineo:  El perineo es el área entre la vagina y el ano  [place] y seca el área para ayudarla a sanar y prevenir arnold infección  Lave el área cuidadosamente con agua y [place] se [place]  Enjuague el perineo con agua tibia cuando [place] el servicio sanitario  Marshall médico podría sugerirle usar un baño de asiento de agua tibia para disminuir el dolor  Un baño de asiento es cuando se llena arnold yaron de baño o basenilla hasta el nivel de las caderas  Se recomienda sentarse en el baño de asiento de 20 a 30 minutos, o aristeo [employer]  Flujo vaginal:  Usted tendrá flujo vaginal, llamado loquios, después del parto  Los loquios son de color tavarez brillante el primer o sara día  Al cuarto día, la cantidad [place], y se torna de un color tavarez - marrón  Use arnold toalla sanitaria en vez de un tampón para prevenir infecciones vaginales  Es normal tener loquios hasta 8 semanas después de que nazca marshall bebé     Períodos menstruales: Marshall período puede empezar de ScaleIO 7 a 12 semanas después de que nazca marshall bebé  Si está amamantando, puede teri [employer] para que marshall período regrese  Usted puede quedar embarazada de nuevo aún si no tiene marshall periodo menstrual  Hable con marshall médico de cabecera acerca de un método anticonceptivo que sea morejon para usted, si no desea quedar embarazada  Cambios en el estado de ánimo:  Muchas madres tienen algún tipo de cambio en marshall estado de ánimo después de marie a benson a marshall bebé  Algunos de [employer] se producen debido a la falta de sueño, cambios hormonales, y el cuidado de un bebé recién nacido  Algunos cambios de ánimo pueden ser [address], aristeo la depresión posparto  Hable con marshall médico de cabecera si se siente incapaz de cuidarse a sí misma o a marshall bebé  Relaciones sexuales:  Probablemente tendrá que evitar tener relaciones sexuales por 6 a 7 semanas después de rosita dado a benson  Usted podría notar menos deseo sexual, o incluso podría sentir dolor al [employer]  Se recomienda el uso de un lubricante vaginal (gel) para ayudarle a sentirse más cómoda [employer]  Comuníquese con marshall médico de cabecera si:   · Usted presenta sangrado vaginal que empapa 1 toalla higiénica o más en 1 hora  · Tiene fiebre  · Marshall dolor no se le [place] o [place]  · La piel entre marshall vagina y recto está inflamada, caliente o Edith Pap  · Colette senos están inflamados, duros o dolorosos  · Se siente muy abraham o deprimida  · Se siente más cansada de lo usual      · Tiene preguntas o inquietudes acerca de marshall condición o cuidados  Busque ayuda inmediata o llame al 911 si:   · Tiene pus o flujo amarillo saliendo de la vagina o herida  · Está orinando muy poquito o no orina del todo  · Colette brazos o piernas se sienten calientes, sensibles y dolorosos  Podrían verse inflamados y rojos  · Siente desvanecimiento, tiene dolor de pecho repentino o peor o dificultad para respirar   [employer] podría sentir más dolor cuando respira profundo o tose o podría toser moses  © 2014 [address] is for End User's use only and may not be sold, redistributed or otherwise used for commercial purposes  All illustrations and images included in CareNotes® are the copyrighted property of A LAURIE A M , Inc  or Rosas Stover  Esta información es sólo para uso en educación  Marshall intención no es darle un consejo médico sobre enfermedades o tratamientos  Colsulte con marshall [employer] farmacéutico antes de seguir cualquier régimen médico para saber si es seguro y efectivo para usted

## 2021-08-21 NOTE — DISCHARGE SUMMARY
Discharge Summary - OB/GYN   Fany Kwan Ajtun 22 y o  female MRN: [id number]  Unit/Bed#: [facility]  Encounter: [id number]      Admission Date: 2021     Discharge Date: 21    Admitting Diagnosis:   1  Pregnancy at [address]  2  Anemia  3  HSV  4  GBS bactiuria  5   labor    Discharge Diagnosis:     Anemia  HSV      Procedures: spontaneous breech delivery    Attending: Darek Salamanca DO    Hospital Course:     Alfred Moctezuma is a 22 y o  [id number] at 32w3d wks who was initially admitted for  labor  She delivered a viable female  on 21 at 0  Weight 4lbs 3 4oz via spontaneous breech delivery  Apgars were 5 (1 min) and 9 (5 min)   was transferred to NICU  Patient tolerated the procedure well and was transferred to recovery in stable condition  Her post-partum course was uncomplicated  Her post-partum pain was well controlled with oral analgesics  On day of discharge, she was ambulating and able to reasonably perform all ADLs  She was voiding and passing flatus  Pain was well controlled  She was discharged home on post-partum day #2 without complications  Patient was instructed to follow up with her OB as an outpatient and was given appropriate warnings to call provider if she develops signs of infection or uncontrolled pain  Complications: none apparent    Condition at discharge: good     Discharge instructions/Information to patient and family:   See after visit summary for information provided to patient and family  Provisions for Follow-Up Care:  See after visit summary for information related to follow-up care and any pertinent home health orders  Disposition: Home    Planned Readmission: No    Discharge Medications: For a complete list of the patient's medications, please refer to her med rec

## 2021-08-21 NOTE — L&D DELIVERY NOTE
Vaginal Delivery Summary - OB/GYN   Fany Vermaun 22 y o  female MRN: [id number]  Unit/Bed#: LD TRIAGE  Encounter: [id number]          Predelivery Diagnosis:  1  Pregnancy at 32 weeks  2  GBS bactiuria  3  HSV  4  Anemia    Postdelivery Diagnosis:  1  Same as above  2  Delivery of      Procedure: Spontaneous Vaginal Delivery    Attending: Lee Miramontes DO    Assistant: Gena Smith MD    Anesthesia: None    EBL: [id number]    Complications: none apparent    Specimens: cord blood, arterial and venous cord blood gasses, placenta to pathology    Findings:   1  Viable female at 0, with APGARS of 5 and 9 at 1 and 5 minutes respectively,  2  Spontaneous delivery of intact placenta at 1415  3  Blood gases:   Arterial pH: 7 018   Arterial base excess: -10 8   Venous pH: 7 049   Venous base excess: -9 8    Disposition:  Patient tolerated the procedure well and was recovering in labor and delivery room     Brief history and labor course:  Ms Rose is a 22 y o  [id number] at [address] [place] [address]  She presented to labor and delivery for vaginal bleeding  Her pregnancy was complicated by HSV and anemia  On exam in triage she was noted to be complete, +1 station and ryan breech with terminal fetal bradycardia  She was admitted for  labor and a STS  was called  However patient was multiparous and had an overwhelming urge to push after spontaneous rupture of membranes for meconium stained fluid  Description of procedure    After pushing for 1 minutes, at 1405 patient delivered a viable female , wt pending, apgars of 5 (1 min) and 9 (5 min)  The fetal sacrum delivered spontaneously in a sacrum anterior position presentation  The fetal body delivered spontaneously up to the shoulders  Baby was checked for nuchal cord and found to have none  The fetal head was then flexed and expelled with maternal efforts along with the fetal feet        Upon delivery, delayed cord clamping was performed, and then the cord was clamped and cut  The infant was passed off to the neonatology team due to prematurity    Arterial and venous cord blood gases and cord blood was collected for analysis  These were promptly sent to the lab  In the immediate post-partum, 30 units of IV pitocin was administered, and the uterus was noted to contract down well with massage and pitocin  The placenta delivered spontaneously at 1415 and was noted to have a centrally inserted 3 vessel cord  The vagina, cervix, perineum, and rectum were inspected and noted to be intact  At the conclusion of the procedure, all needle, sponge, and instrument counts were noted to be correct  Patient tolerated the procedure well and was allowed to recover in labor and delivery room with family and  before being transferred to the post-partum floor  The attending, Dr Vanessa Eric, was present and participated in all key portions of the case          Garrett Talavera MD  2021  3:20 PM

## 2021-08-21 NOTE — PROGRESS NOTES
Pt arrived in triage  Ruel [address] bedside  Nursing applied monitors and started IV access  Pt was determined to be complete with a breech presentation  Healthcare team began to prepare for a stat c/s  Pt ruptured her membranes and pushed baby out quickly in triage

## 2021-08-21 NOTE — LACTATION NOTE
Fany is pumping for her baby girl in NICU  Was set up pumping and given pumping instructions in Maori

## 2021-08-21 NOTE — PLAN OF CARE
Problem: POSTPARTUM  Goal: Experiences normal postpartum course  Description: INTERVENTIONS:  - Monitor maternal vital signs  - Assess uterine involution and lochia  Outcome: Progressing  Goal: Appropriate maternal -  bonding  Description: INTERVENTIONS:  - Identify family support  - Assess for appropriate maternal/infant bonding   -Encourage maternal/infant bonding opportunities  - Referral to  or  as needed  Outcome: Progressing  Goal: Establishment of infant feeding pattern  Description: INTERVENTIONS:  - Assess breast/bottle feeding  - Refer to lactation as needed  Outcome: Progressing  Goal: Incision(s), wounds(s) or drain site(s) healing without S/S of infection  Description: INTERVENTIONS  - Assess and document dressing, incision, wound bed, drain sites and surrounding tissue  - Provide patient and family education  - Perform skin care/dressing changes   Outcome: Progressing

## 2021-08-22 PROCEDURE — 99024 POSTOP FOLLOW-UP VISIT: CPT | Performed by: STUDENT IN AN ORGANIZED HEALTH CARE EDUCATION/TRAINING PROGRAM

## 2021-08-22 RX ADMIN — ACETAMINOPHEN 650 MG: 325 TABLET, FILM COATED ORAL at 05:42

## 2021-08-22 RX ADMIN — VALACYCLOVIR HYDROCHLORIDE 500 MG: 500 TABLET, FILM COATED ORAL at 17:19

## 2021-08-22 RX ADMIN — DOCUSATE SODIUM 100 MG: 100 CAPSULE ORAL at 17:19

## 2021-08-22 RX ADMIN — ACETAMINOPHEN 650 MG: 325 TABLET, FILM COATED ORAL at 09:37

## 2021-08-22 RX ADMIN — VALACYCLOVIR HYDROCHLORIDE 500 MG: 500 TABLET, FILM COATED ORAL at 09:19

## 2021-08-22 RX ADMIN — DOCUSATE SODIUM 100 MG: 100 CAPSULE ORAL at 09:19

## 2021-08-22 NOTE — PLAN OF CARE
Problem: POSTPARTUM  Goal: Experiences normal postpartum course  Description: INTERVENTIONS:  - Monitor maternal vital signs  - Assess uterine involution and lochia  Outcome: Progressing  Goal: Appropriate maternal -  bonding  Description: INTERVENTIONS:  - Identify family support  - Assess for appropriate maternal/infant bonding   -Encourage maternal/infant bonding opportunities  - Referral to  or  as needed  Outcome: Progressing  Goal: Establishment of infant feeding pattern  Description: INTERVENTIONS:  - Assess breast/bottle feeding  - Refer to lactation as needed  Outcome: Progressing  Goal: Incision(s), wounds(s) or drain site(s) healing without S/S of infection  Description: INTERVENTIONS  - Assess and document dressing, incision, wound bed, drain sites and surrounding tissue  - Provide patient and family education  Outcome: Progressing

## 2021-08-22 NOTE — PROGRESS NOTES
Progress Note - OB/GYN   Fany Loredo 22 y o  female MRN: [id number]  Unit/Bed#:  322-01 Encounter: [id number]    Assessment:  Post partum Day #1 s/pSVD, stable, baby in NICU for pre-term delivery at [facility] 30:  1  Continue routine post partum care   Encourage ambulation   Encourage breastfeeding   Anticipate discharge PPD2  2  Future contraception   Desires Nexplanon  Does not have insurance  To be addressed at pp visit  Subjective/Objective   Chief Complaint:     Post delivery  Patient is doing well  Lochia WNL  Pain well controlled  Subjective:     Pain: yes, cramping, improved with meds  Tolerating PO: yes  Voiding: yes  Flatus: yes  BM: no  Ambulating: yes  Chest pain: no  Shortness of breath: no  Leg pain: no  Lochia: minimal    Objective:     Vitals: /56   Pulse 58   Temp 98 9 °F (37 2 °C) (Oral)   Resp 18   LMP  (LMP Unknown)   SpO2 97%   Breastfeeding Unknown       Intake/Output Summary (Last 24 hours) at 8/22/2021 1118  Last data filed at 8/22/2021 0100  Gross per 24 hour   Intake --   Output 1200 ml   Net -1200 ml       Lab Results   Component Value Date    WBC 9 01 08/21/2021    HGB 11 4 (L) 08/21/2021    HCT 35 8 08/21/2021    MCV 79 (L) 08/21/2021     08/21/2021       Physical Exam:     Gen: AAOx3, NAD  CV: RRR  Lungs: CTA b/l  Abd: Soft, non-tender, non-distended, no rebound or guarding  Uterine fundus firm and non-tender, 2 cm below the umbilicus     Ext: Non tender    Olivia Jaimes MD  8/22/2021  11:18 AM

## 2021-08-22 NOTE — ED ATTENDING ATTESTATION
8/20/2021  IHugh MD, saw and evaluated the patient  I have discussed the patient with the resident/non-physician practitioner and agree with the resident's/non-physician practitioner's findings, Plan of Care, and MDM as documented in the resident's/non-physician practitioner's note, except where noted  All available labs and Radiology studies were reviewed  I was present for key portions of any procedure(s) performed by the resident/non-physician practitioner and I was immediately available to provide assistance  At this point I agree with the current assessment done in the Emergency Department    I have conducted an independent evaluation of this patient a history and physical is as follows: see h and p above agree with resident tx/care Cheko Wolf    ED Course  ED Course as of Aug 22 1430   Fri Aug 20, 2021   1130 CXR PORTABLE- NO OLD CXR FOR COMPARISON -- NORMAL MEDIASTINUM/ NO FREE/SQ AIR- NO INFILTRATE/ PTX/ PULM EDEMA/ PLEURAL EFFUSIONS      1151 ER MD NOTE- EQUAL BUE PULSE OX PLETH 94-96 % ON RA       1226 -- ER MD NOTE- PT HAD ABD/CHEST U/S BY RESIDENT-  IMAGES REVIEWED BY ER MD      1415 ER MD NOTE- H AND P- AND ALL LABS/ IMAGING RESULTS AND D/C INSTRUCTIONS EXPLAINED TO PT AND PRESUMED FATHER  BY LANGUAGE IPAD-            Critical Care Time  Procedures

## 2021-08-23 VITALS
OXYGEN SATURATION: 98 % | DIASTOLIC BLOOD PRESSURE: 61 MMHG | SYSTOLIC BLOOD PRESSURE: 116 MMHG | RESPIRATION RATE: 20 BRPM | HEART RATE: 51 BPM | TEMPERATURE: 98.3 F

## 2021-08-23 PROBLEM — Z3A.32 32 WEEKS GESTATION OF PREGNANCY: Chronic | Status: RESOLVED | Noted: 2021-04-07 | Resolved: 2021-08-23

## 2021-08-23 PROBLEM — O99.013 ANEMIA AFFECTING PREGNANCY IN THIRD TRIMESTER: Status: RESOLVED | Noted: 2021-07-14 | Resolved: 2021-08-23

## 2021-08-23 PROBLEM — A60.00 HERPES SIMPLEX INFECTION OF GENITOURINARY SYSTEM: Status: RESOLVED | Noted: 2021-07-22 | Resolved: 2021-08-23

## 2021-08-23 LAB — RPR SER QL: NORMAL

## 2021-08-23 PROCEDURE — 99024 POSTOP FOLLOW-UP VISIT: CPT | Performed by: OBSTETRICS & GYNECOLOGY

## 2021-08-23 RX ORDER — CALCIUM CARBONATE 200(500)MG
1000 TABLET,CHEWABLE ORAL DAILY PRN
Refills: 0
Start: 2021-08-23

## 2021-08-23 RX ORDER — DOCUSATE SODIUM 100 MG/1
100 CAPSULE, LIQUID FILLED ORAL 2 TIMES DAILY
Qty: 10 CAPSULE | Refills: 0
Start: 2021-08-23

## 2021-08-23 RX ORDER — SIMETHICONE 80 MG
80 TABLET,CHEWABLE ORAL 4 TIMES DAILY PRN
Qty: 30 TABLET | Refills: 0
Start: 2021-08-23

## 2021-08-23 RX ORDER — ACETAMINOPHEN 325 MG/1
650 TABLET ORAL EVERY 6 HOURS PRN
Refills: 0
Start: 2021-08-23

## 2021-08-23 RX ORDER — IBUPROFEN 600 MG/1
600 TABLET ORAL EVERY 6 HOURS PRN
Qty: 30 TABLET | Refills: 0
Start: 2021-08-23

## 2021-08-23 RX ADMIN — VALACYCLOVIR HYDROCHLORIDE 500 MG: 500 TABLET, FILM COATED ORAL at 09:09

## 2021-08-23 RX ADMIN — DOCUSATE SODIUM 100 MG: 100 CAPSULE ORAL at 09:09

## 2021-08-23 NOTE — PROGRESS NOTES
Progress Note - OB/GYN   Fany Loredo 22 y o  female MRN: [id number]  Unit/Bed#:  322-01 Encounter: [id number]    Assessment:  Post partum Day #2 s/pSVD, stable, baby in NICU for pre-term delivery at [facility] 30:  1  Continue routine post partum care   Encourage ambulation   Encourage breastfeeding   Discharge home today  2  Future contraception   Desires Nexplanon  Does not have insurance  To be addressed at pp visit  Subjective/Objective   Chief Complaint:     Post delivery  Patient is doing well  Lochia WNL  Pain well controlled  Subjective:     Pain: yes, cramping, improved with meds  Tolerating PO: yes  Voiding: yes  Flatus: yes  BM: no  Ambulating: yes  Chest pain: no  Shortness of breath: no  Leg pain: no  Lochia: minimal    Objective:     Vitals: /62 (BP Location: Left arm)   Pulse 62   Temp 97 9 °F (36 6 °C) (Oral)   Resp 18   LMP  (LMP Unknown)   SpO2 97%   Breastfeeding Unknown     No intake or output data in the 24 hours ending 08/23/21 0642    Lab Results   Component Value Date    WBC 9 01 08/21/2021    HGB 11 4 (L) 08/21/2021    HCT 35 8 08/21/2021    MCV 79 (L) 08/21/2021     08/21/2021       Physical Exam:     Gen: AAOx3, NAD  CV: RRR  Lungs: CTA b/l  Abd: Soft, non-tender, non-distended, no rebound or guarding  Uterine fundus firm and non-tender, 2 cm below the umbilicus     Ext: Non tender    Cat Almonte MD  8/23/2021  6:42 AM

## 2021-08-24 LAB
ATRIAL RATE: 68 BPM
P AXIS: 34 DEGREES
PR INTERVAL: 128 MS
QRS AXIS: 90 DEGREES
QRSD INTERVAL: 82 MS
QT INTERVAL: 382 MS
QTC INTERVAL: 406 MS
T WAVE AXIS: 40 DEGREES
VENTRICULAR RATE: 68 BPM

## 2021-08-24 PROCEDURE — 93010 ELECTROCARDIOGRAM REPORT: CPT | Performed by: INTERNAL MEDICINE

## 2021-08-26 ENCOUNTER — TELEPHONE (OUTPATIENT)
Dept: OBGYN CLINIC | Facility: CLINIC | Age: 26
End: 2021-08-26

## 2021-08-26 ENCOUNTER — HOSPITAL ENCOUNTER (EMERGENCY)
Facility: HOSPITAL | Age: 26
Discharge: HOME | End: 2021-08-26
Attending: EMERGENCY MEDICINE | Admitting: EMERGENCY MEDICINE
Payer: COMMERCIAL

## 2021-08-26 VITALS
DIASTOLIC BLOOD PRESSURE: 51 MMHG | RESPIRATION RATE: 18 BRPM | HEART RATE: 66 BPM | OXYGEN SATURATION: 98 % | TEMPERATURE: 98.8 F | SYSTOLIC BLOOD PRESSURE: 104 MMHG

## 2021-08-26 DIAGNOSIS — D50.9 IRON DEFICIENCY ANEMIA: ICD-10-CM

## 2021-08-26 DIAGNOSIS — F50.89 PICA IN ADULTS: Primary | ICD-10-CM

## 2021-08-26 LAB
BASOPHILS # BLD AUTO: 0.05 THOUSANDS/ΜL (ref 0–0.1)
BASOPHILS NFR BLD AUTO: 1 % (ref 0–1)
EOSINOPHIL # BLD AUTO: 0.17 THOUSAND/ΜL (ref 0–0.61)
EOSINOPHIL NFR BLD AUTO: 3 % (ref 0–6)
ERYTHROCYTE [DISTWIDTH] IN BLOOD BY AUTOMATED COUNT: 15.3 % (ref 11.6–15.1)
FERRITIN SERPL-MCNC: 8 NG/ML (ref 8–388)
HCT VFR BLD AUTO: 36.6 % (ref 34.8–46.1)
HGB BLD-MCNC: 11.3 G/DL (ref 11.5–15.4)
IMM GRANULOCYTES # BLD AUTO: 0.04 THOUSAND/UL (ref 0–0.2)
IMM GRANULOCYTES NFR BLD AUTO: 1 % (ref 0–2)
IRON SATN MFR SERPL: 4 %
IRON SERPL-MCNC: 30 UG/DL (ref 50–170)
LYMPHOCYTES # BLD AUTO: 2.16 THOUSANDS/ΜL (ref 0.6–4.47)
LYMPHOCYTES NFR BLD AUTO: 36 % (ref 14–44)
MCH RBC QN AUTO: 25.1 PG (ref 26.8–34.3)
MCHC RBC AUTO-ENTMCNC: 30.9 G/DL (ref 31.4–37.4)
MCV RBC AUTO: 81 FL (ref 82–98)
MONOCYTES # BLD AUTO: 0.35 THOUSAND/ΜL (ref 0.17–1.22)
MONOCYTES NFR BLD AUTO: 6 % (ref 4–12)
NEUTROPHILS # BLD AUTO: 3.2 THOUSANDS/ΜL (ref 1.85–7.62)
NEUTS SEG NFR BLD AUTO: 53 % (ref 43–75)
NRBC BLD AUTO-RTO: 0 /100 WBCS
PLATELET # BLD AUTO: 506 THOUSANDS/UL (ref 149–390)
PMV BLD AUTO: 8.7 FL (ref 8.9–12.7)
RBC # BLD AUTO: 4.5 MILLION/UL (ref 3.81–5.12)
TIBC SERPL-MCNC: 727 UG/DL (ref 250–450)
WBC # BLD AUTO: 5.97 THOUSAND/UL (ref 4.31–10.16)

## 2021-08-26 PROCEDURE — 83550 IRON BINDING TEST: CPT | Performed by: PHYSICIAN ASSISTANT

## 2021-08-26 PROCEDURE — 36415 COLL VENOUS BLD VENIPUNCTURE: CPT | Performed by: PHYSICIAN ASSISTANT

## 2021-08-26 PROCEDURE — 82728 ASSAY OF FERRITIN: CPT | Performed by: PHYSICIAN ASSISTANT

## 2021-08-26 PROCEDURE — 99282 EMERGENCY DEPT VISIT SF MDM: CPT | Performed by: PHYSICIAN ASSISTANT

## 2021-08-26 PROCEDURE — 83540 ASSAY OF IRON: CPT | Performed by: PHYSICIAN ASSISTANT

## 2021-08-26 PROCEDURE — 99283 EMERGENCY DEPT VISIT LOW MDM: CPT

## 2021-08-26 PROCEDURE — 85025 COMPLETE CBC W/AUTO DIFF WBC: CPT | Performed by: PHYSICIAN ASSISTANT

## 2021-08-26 NOTE — ED PROVIDER NOTES
History  Chief Complaint   Patient presents with    Itching     pt reports she has been itch for 15 days     Patient presents emergency room complaining of itching  He recently gave birth to her daughter  She was told that she was anemic and was placed on iron  She took 15 days worth of the iron supplement but her itching has not subsided  She called the office today and was sent  in to have her levels checked  No lightheadedness or dizziness with change of position  No palpitations  No shortness of breath with activity  No chest pain  Patient states that she goes through 3 pads per day scant amounts and it is decreasing daily  She had a breech delivery vaginally 1 week ago  She was taking Iron but is out of the medicine  History provided by:  Patient   used: Yes ([id number])    Medical Problem  Location:  Itching  Severity:  Mild  Onset quality:  Gradual  Duration:  2 weeks  Chronicity:  New  Associated symptoms: no abdominal pain, no chest pain, no fatigue, no rash and no shortness of breath        Prior to Admission Medications   Prescriptions Last Dose Informant Patient Reported?  Taking?   acetaminophen (TYLENOL) 325 mg tablet   No No   Sig: Take 2 tablets (650 mg total) by mouth every 6 (six) hours as needed for mild pain   calcium carbonate (TUMS) 500 mg chewable tablet   No No   Sig: Chew 2 tablets (1,000 mg total) daily as needed for indigestion or heartburn   docusate sodium (COLACE) 100 mg capsule   No No   Sig: Take 1 capsule (100 mg total) by mouth 2 (two) times a day   ferrous sulfate 324 (65 Fe) mg   No No   Sig: Take 1 tablet (324 mg total) by mouth 2 (two) times a day before meals   ibuprofen (MOTRIN) 600 mg tablet   No No   Sig: Take 1 tablet (600 mg total) by mouth every 6 (six) hours as needed for moderate pain   simethicone (MYLICON) 80 mg chewable tablet   No No   Sig: Chew 1 tablet (80 mg total) 4 (four) times a day as needed for flatulence   valACYclovir (VALTREX) 500 mg tablet   No No   Sig: Take 1 tablet (500 mg total) by mouth 2 (two) times a day   witch hazel-glycerin (TUCKS) topical pad   No No   Sig: Apply 1 pad topically every 2 (two) hours as needed for irritation      Facility-Administered Medications: None       Past Medical History:   Diagnosis Date    Herpes     Latent    Patient denies medical problems        Past Surgical History:   Procedure Laterality Date    NO PAST SURGERIES         Family History   Problem Relation Age of Onset    No Known Problems Mother     No Known Problems Father     No Known Problems Brother     No Known Problems Daughter     No Known Problems Brother     No Known Problems Brother     No Known Problems Maternal Grandmother     No Known Problems Maternal Grandfather     No Known Problems Daughter      I have reviewed and agree with the history as documented  E-Cigarette/Vaping    E-Cigarette Use Never User      E-Cigarette/Vaping Substances    Nicotine No     THC No     CBD No     Flavoring No     Other No     Unknown No      Social History     Tobacco Use    Smoking status: Never Smoker    Smokeless tobacco: Never Used   Vaping Use    Vaping Use: Never used   Substance Use Topics    Alcohol use: Not Currently     Comment: socially    Drug use: No       Review of Systems   Constitutional: Negative for activity change, appetite change, chills, diaphoresis and fatigue  Respiratory: Negative for shortness of breath  Cardiovascular: Negative for chest pain  Gastrointestinal: Negative for abdominal pain  Genitourinary: Negative for vaginal bleeding  Skin: Negative for color change, pallor and rash  Psychiatric/Behavioral: Negative for confusion  All other systems reviewed and are negative  Physical Exam  Physical Exam  Vitals and nursing note reviewed  Constitutional:       General: She is not in acute distress  Appearance: She is normal weight   She is not ill-appearing, toxic-appearing or diaphoretic  HENT:      Head: Normocephalic and atraumatic  Right Ear: External ear normal       Left Ear: External ear normal    Eyes:      General:         Right eye: No discharge  Left eye: No discharge  Conjunctiva/sclera: Conjunctivae normal    Cardiovascular:      Rate and Rhythm: Normal rate and regular rhythm  Heart sounds: Normal heart sounds  No murmur heard  No friction rub  No gallop  Pulmonary:      Effort: Pulmonary effort is normal       Breath sounds: Normal breath sounds  Musculoskeletal:      Cervical back: Neck supple  No rigidity or tenderness  Lymphadenopathy:      Cervical: No cervical adenopathy  Skin:     General: Skin is warm  Capillary Refill: Capillary refill takes less than 2 seconds  Findings: No rash  Neurological:      Mental Status: She is alert and oriented to person, place, and time  Psychiatric:         Mood and Affect: Mood normal          Behavior: Behavior normal          Thought Content:  Thought content normal          Judgment: Judgment normal          Vital Signs  ED Triage Vitals [08/26/21 1334]   Temperature Pulse Respirations Blood Pressure SpO2   98 8 °F (37 1 °C) 66 18 104/51 98 %      Temp Source Heart Rate Source Patient Position - Orthostatic VS BP Location FiO2 (%)   Oral -- -- Left arm --      Pain Score       No Pain           Vitals:    08/26/21 1334   BP: 104/51   Pulse: 66         Visual Acuity      ED Medications  Medications - No data to display    Diagnostic Studies  Results Reviewed     Procedure Component Value Units Date/Time    Comprehensive metabolic panel [[id number]] Updated: 08/26/21 1621    Lab Status: No result Specimen: Blood from Arm, Right     CBC and differential [[id number]]  (Abnormal) Collected: 08/26/21 1448    Lab Status: Final result Specimen: Blood from Arm, Right Updated: 08/26/21 1455     WBC 5 97 Thousand/uL      RBC 4 50 Million/uL      Hemoglobin 11 3 g/dL Hematocrit 36 6 %      MCV 81 fL      MCH 25 1 pg      MCHC 30 9 g/dL      RDW 15 3 %      MPV 8 7 fL      Platelets 153 Thousands/uL      nRBC 0 /100 WBCs      Neutrophils Relative 53 %      Immat GRANS % 1 %      Lymphocytes Relative 36 %      Monocytes Relative 6 %      Eosinophils Relative 3 %      Basophils Relative 1 %      Neutrophils Absolute 3 20 Thousands/µL      Immature Grans Absolute 0 04 Thousand/uL      Lymphocytes Absolute 2 16 Thousands/µL      Monocytes Absolute 0 35 Thousand/µL      Eosinophils Absolute 0 17 Thousand/µL      Basophils Absolute 0 05 Thousands/µL     Iron Saturation % [[id number]] Collected: 08/26/21 1448    Lab Status: In process Specimen: Blood from Arm, Right Updated: 08/26/21 1449    Ferritin [[id number]] Collected: 08/26/21 1448    Lab Status: In process Specimen: Blood from Arm, Right Updated: 08/26/21 1449                 No orders to display              Procedures  Procedures         ED Course                             SBIRT 20yo+      Most Recent Value   SBIRT (25 yo +)   In order to provide better care to our patients, we are screening all of our patients for alcohol and drug use  Would it be okay to ask you these screening questions? Unable to answer at this time Filed at: 08/26/2021 1350                    MDM  Number of Diagnoses or Management Options  Pica in adults: new and requires workup     Amount and/or Complexity of Data Reviewed  Clinical lab tests: ordered and reviewed  Tests in the medicine section of CPT®: ordered and reviewed    Risk of Complications, Morbidity, and/or Mortality  Presenting problems: moderate  Diagnostic procedures: moderate  Management options: moderate  General comments: Patient presents emergency for evaluation of generalized itchiness  She was diagnosed with the postpartum anemia and was placed on iron for approximately 10 days  She complains of persistent itching    She was told to come to the emergency room to have repeat blood work   She was seen and examined  Her CBC was taking which shows her hemoglobin to be stable at 11 2  Her total iron binding capacity test is pending  She was discharged home was instructed that the anemia will take following another wanted to for the itching to resolve  She will follow up with her gynecologist       Patient Progress  Patient progress: stable      Disposition  Final diagnoses:   Pica in adults     Time reflects when diagnosis was documented in both MDM as applicable and the Disposition within this note     Time User Action Codes Description Comment    8/26/2021  4:15 PM Queen Safe Add [F50 89] Pica in adults       ED Disposition     ED Disposition Condition Date/Time Comment    Discharge Stable Thu Aug 26, 2021  4:15 PM Rose discharge to home/self care              Follow-up Information    None         Discharge Medication List as of 8/26/2021  4:16 PM      CONTINUE these medications which have NOT CHANGED    Details   acetaminophen (TYLENOL) 325 mg tablet Take 2 tablets (650 mg total) by mouth every 6 (six) hours as needed for mild pain, Starting Mon 8/23/2021, No Print      calcium carbonate (TUMS) 500 mg chewable tablet Chew 2 tablets (1,000 mg total) daily as needed for indigestion or heartburn, Starting Mon 8/23/2021, No Print      docusate sodium (COLACE) 100 mg capsule Take 1 capsule (100 mg total) by mouth 2 (two) times a day, Starting Mon 8/23/2021, No Print      ferrous sulfate 324 (65 Fe) mg Take 1 tablet (324 mg total) by mouth 2 (two) times a day before meals, Starting Wed 7/14/2021, Normal      ibuprofen (MOTRIN) 600 mg tablet Take 1 tablet (600 mg total) by mouth every 6 (six) hours as needed for moderate pain, Starting Mon 8/23/2021, No Print      simethicone (MYLICON) 80 mg chewable tablet Chew 1 tablet (80 mg total) 4 (four) times a day as needed for flatulence, Starting Mon 8/23/2021, No Print      valACYclovir (VALTREX) 500 mg tablet Take 1 tablet (500 mg total) by mouth 2 (two) times a day, Starting Thu 7/22/2021, Until Fri 7/22/2022, Normal      witch hazel-glycerin (TUCKS) topical pad Apply 1 pad topically every 2 (two) hours as needed for irritation, Starting Mon 8/23/2021, No Print           No discharge procedures on file      PDMP Review     None          ED Provider  Electronically Signed by           Bill Starr PA-C  08/26/21 6016

## 2021-08-26 NOTE — TELEPHONE ENCOUNTER
SOL JARQUIN attempted to complete transiiton of care post partum call  Pt reported "rash all over body" to SOL JARQUIN  Per pt no difficulty breathing  SOL JARQUIN consulted provider  Provider advised pt via  to go to urgent care or ED for further evaluation and to call back [address] [place] [address] for follow-up appt  Pt agreed to plan

## 2021-08-26 NOTE — TELEPHONE ENCOUNTER
Pp pt called c/o rash all over her body, pt was told to go to the nearest hospital  Pt verbalized understanding

## 2021-08-30 RX ORDER — FERROUS SULFATE 325(65) MG
325 TABLET ORAL DAILY
Qty: 30 TABLET | Refills: 0 | Status: SHIPPED | OUTPATIENT
Start: 2021-08-30

## 2021-08-30 NOTE — RESULT ENCOUNTER NOTE
I spoke w/ pt  8/30/2021 at 945 using language line  [id number]  I explained that despite having taken iron for a couple of weeks her iron levels were still low  She confirmed that the [facility] pharmacy on [address] was correct and I prescribed a 1 month supply of daily iron tablets  She indicates that she is no longer bleeding and I encouraged her to follow-up with her OBGYN as I did not yet see that she had a scheduled follow-up appointment  She indicated that she would do so and demonstrated understanding that her length iron supplement may be adjusted at this visit

## 2021-09-06 NOTE — PROGRESS NOTES
POSTPARTUM VISIT    Fany Ingram Parent presents today for postpartum visit  She had a vaginal delivery on 08/21/2021  No complications were reported  She is breast and bottle feeding her infant and reports no issues with  She desires nexplanon or for contraception  Review of Systems:   -Constitutional:  Negative   -Breasts: denies tenderness   -Gynecologic: lochia minimal   -Urinary: denies issues urinating   -GI: stools WNL, denies N/V/D    Physical Exam:   -Vitals:   Vitals:    09/07/21 0916   BP: 99/59   BP Location: Right arm   Patient Position: Sitting   Cuff Size: Adult   Pulse: 56   Weight: 53 5 kg (118 lb)   Height: 4' 9 5" (1 461 m)      -General: A&Ox3, no acute distress               -Chest:  CTA BL              -Heart: S1-S2   -Abdomen: soft, non-tender, +BS the   -Extremities: nontender, no edema noted   -Breasts: R: nontender, no masses, no  Drainage L: nontender, no masses, no  drainage   -Pelvic exam: Deferred,  After review of  delivery notes and with patient denies  any current concerns  Assessment/Plan:    Diagnoses and all orders for this visit:    Follow-up, postpartum, routine  1  Normal postpartum exam   2  Depression screening negative  3  Last pap smear was done 03/12/2021 and     result was negative  4 Contraception:  Patient desires nexplanon for contraception  However, patient currently has no insurance and was counseled on the likelihood of pain and a pocket  Patient was given other alternatives such as OCP, Depo-Provera and IUD    Patient states she was still prefer nexplanon and will consider paying out of pocket

## 2021-09-07 ENCOUNTER — POSTPARTUM VISIT (OUTPATIENT)
Dept: OBGYN CLINIC | Facility: CLINIC | Age: 26
End: 2021-09-07

## 2021-09-07 VITALS
HEIGHT: 58 IN | WEIGHT: 118 LBS | HEART RATE: 56 BPM | SYSTOLIC BLOOD PRESSURE: 99 MMHG | DIASTOLIC BLOOD PRESSURE: 59 MMHG | BODY MASS INDEX: 24.77 KG/M2

## 2021-09-07 PROCEDURE — 99213 OFFICE O/P EST LOW 20 MIN: CPT | Performed by: OBSTETRICS & GYNECOLOGY

## 2021-09-21 ENCOUNTER — PATIENT OUTREACH (OUTPATIENT)
Dept: OBGYN CLINIC | Facility: CLINIC | Age: 26
End: 2021-09-21

## 2021-09-21 NOTE — PROGRESS NOTES
BETTY GALICIA spoke with Pt for post partum follow up  Pt reported her and baby Ila Fabry are doing well  Baby is feeding by breast and formula  Pt denies any post partum depression signs  Pt reported is interested on Nexplanon for contraception  Pt only have SW financial Assistance and would like to know the price  Pt denies other concern at this time  Pt will be contacted with information regarding Nexplanon

## 2022-07-10 NOTE — TELEPHONE ENCOUNTER
----- Message from Yuan Vital DO sent at 3/18/2021  4:23 PM EDT -----  Please let patient know PAP smear was normal     Findings did suggest BV (bacterial vaginosis), which was have already started her on treatment for, so she should make sure to complete that       Thank you  ----- Message -----  From: Yuan Vital DO  Sent: 3/12/2021   3:03 PM EDT  To: Yuan Vital DO Statement Selected

## 2022-11-02 NOTE — PROGRESS NOTES
Prenatal H&P      Subjective:  Fany young a20 yo [id number] @9w2d here for initial prenatal H&P  This is an intended pregnancy  LMP unknown  Patient is currently doing well  Her previous pregnancies have been normal  She currently does housekeeping for work  She has other kids but they are in [facility]  Lives with her brother  She has a good support system at home  She does not have a known family history of inheritable conditions such as physical or intellectual disabilities, birth defects, blood disorders, heart or neural tube defects  She denies recent travel  She denies use of nicotine, EtOH or recreational drug use  Has some vaginal discharge  She says it's green with bad odor  Denies any dysuria or blood in urine  This started 1 5 months ago  Has not changed since it started  Never happened in the past  Last had sex 2 weeks ago  She is having sex with 1 other person, and they are monogomous  No h/o STI  Denies fevers, chills, genital sores  OB History    Para Term  AB Living   4 2 2   1 2   SAB TAB Ectopic Multiple Live Births   1       2      # Outcome Date GA Lbr Neal/2nd Weight Sex Delivery Anes PTL Lv   4 Current            3 SAB 10/2019           2 Term 11 40w0d   F Vag-Spont None  KATH   1 Term 03/28/10    F Vag-Spont None  KATH         Objective:   /68 (BP Location: Left arm, Patient Position: Sitting, Cuff Size: Adult)   Pulse 71   Ht 4' 9 5" (1 461 m)   Wt 51 3 kg (113 lb)   LMP  (LMP Unknown)   BMI 24 03 kg/m²     General:   alert and oriented, in no acute distress, appears stated age and cooperative   Heart: regular rate and rhythm, S1, S2 normal, no murmur, click, rub or gallop   Lungs: clear to auscultation bilaterally   Abdomen: soft, non-tender, without masses or organomegaly   Vulva: normal   Vagina: Copious yellow mucousy discharge, pH 5, wet prep done   Cervix: anteverted, no cervical motion tenderness and bleeding following pap  thick, yellow discharge  Pt advised orders in epic Uterus: normal size, non-tender   Adnexa: normal adnexa     Component 3/12/21 3:02 PM   WET MOUNT positive    Yeast, Wet Prep negative    pH 5    Whiff Test n/a    Clue Cells positive    Trich, Wet Prep negative        Assessment and Plan:  22year old female [id number] @9w2d; has BV, started flagyl  Financial concerns- discussed that hospital may be able to cover cost for prenatal labs  Follow up in 1 month      1    US on 2/25/21showed EGA 7weeks and 1d with an MALIA 10/13/2021  2  Pap smear performed  3  GC/CT collected  4  Prenatal labs ordered  5  Delivery consent form to be signed at 28 weeks  6  Likely has BV- prescribed 7 day course of flagyl 500mg BID  7  RTC in 4 weeks  Continue PNV QD      Signature: Ed Snell DO, [address], PGY-2  03/12/21

## 2023-04-27 ENCOUNTER — ULTRASOUND (OUTPATIENT)
Dept: OBGYN CLINIC | Facility: CLINIC | Age: 28
End: 2023-04-27

## 2023-04-27 VITALS
HEIGHT: 58 IN | DIASTOLIC BLOOD PRESSURE: 74 MMHG | BODY MASS INDEX: 23.63 KG/M2 | SYSTOLIC BLOOD PRESSURE: 118 MMHG | WEIGHT: 112.6 LBS

## 2023-04-27 DIAGNOSIS — Z3A.11 11 WEEKS GESTATION OF PREGNANCY: Primary | ICD-10-CM

## 2023-04-27 DIAGNOSIS — O20.0 THREATENED ABORTION: ICD-10-CM

## 2023-04-27 NOTE — PROGRESS NOTES
"Assessment      gestational sac at about 6 weks        Plan      hcg and repeat ultrasound in 2 weeks        Subjective   Fany Bunn is a 32 y o  female who presents for evaluation of amenorrhea  She believes she could be pregnant  Pregnancy is desired  Sexual Activity: single partner, contraception: none  Current symptoms also include: positive home pregnancy test  Last period was normal She has had no vaginal bleeding  Patient's last menstrual period was 01/28/2023 (approximate)  The following portions of the patient's history were reviewed and updated as appropriate: allergies, current medications, past family history, past medical history, past social history, past surgical history and problem list     Review of Systems  Pertinent items are noted in HPI         Objective   /74 (BP Location: Left arm, Patient Position: Sitting, Cuff Size: Adult)   Ht 4' 9 5\" (1 461 m)   Wt 51 1 kg (112 lb 9 6 oz)   LMP 01/28/2023 (Approximate)   BMI 23 94 kg/m²     General:   alert and oriented, in no acute distress   Heart: regular rate and rhythm, S1, S2 normal, no murmur, click, rub or gallop   Lungs: clear to auscultation bilaterally   Abdomen: soft, non-tender, without masses or organomegaly   Vulva: normal   Vagina: normal mucosa   Cervix: anteverted   Uterus: normal size   Adnexa: normal adnexa     Lab Review  Urine HCG: positive      "

## 2023-04-29 ENCOUNTER — APPOINTMENT (OUTPATIENT)
Dept: LAB | Facility: HOSPITAL | Age: 28
End: 2023-04-29
Attending: OBSTETRICS & GYNECOLOGY

## 2023-04-29 DIAGNOSIS — O20.0 THREATENED ABORTION, ANTEPARTUM: ICD-10-CM

## 2023-04-29 LAB — B-HCG SERPL-ACNC: ABNORMAL MIU/ML (ref 0–11.6)

## 2023-05-01 ENCOUNTER — TELEPHONE (OUTPATIENT)
Dept: OBGYN CLINIC | Facility: CLINIC | Age: 28
End: 2023-05-01

## 2023-05-04 ENCOUNTER — TELEPHONE (OUTPATIENT)
Dept: OBGYN CLINIC | Facility: CLINIC | Age: 28
End: 2023-05-04

## 2023-05-04 NOTE — TELEPHONE ENCOUNTER
hcg results reviewed via [facility]   Patient has [address] scheduled for 5/11

## 2023-05-08 NOTE — PROGRESS NOTES
"[address] 32 y o  SUBJECTIVE    CC:  \"I'm pregnant\"    HPI: Jeannette Moser is a 32 y o  [id number] female presenting today for evaluation of amenorrhea  She was seen 23 for evaluation of amenorrhea, with ultrasound showing gestational sac around 6 weeks  She reported at the time her LMP was 23  Her periods are irregular  Beta hCG since was 12719  She reports some nausea, but no vomiting, denies vaginal bleeding or cramping  She was using nothing for contraception prior to LMP  Her obstetric history is notable for two term vaginal deliveries in [place] and a spontaneous  breech vaginal delivery in 2021 at 32 weeks  She otherwise has a history of HSV and anemia  She had desired postpartum Nexplanon but was uninsured  She denies having had any miscarriages, and reports that chart documentation of SAB in 10/2019 is incorrect  OBJECTIVE  Vitals:    23 1538   BP: 106/62   BP Location: Left arm   Patient Position: Sitting   Cuff Size: Adult   Weight: 51 2 kg (112 lb 12 8 oz)   Height: 4' 9 5\" (1 461 m)       Physical Exam  Constitutional:       General: She is not in acute distress  Appearance: She is normal weight  She is not ill-appearing or toxic-appearing  Comments: petite   Genitourinary:      Vulva normal    HENT:      Head: Normocephalic and atraumatic  Eyes:      Conjunctiva/sclera: Conjunctivae normal    Cardiovascular:      Pulses: Normal pulses  Pulmonary:      Effort: Pulmonary effort is normal  No respiratory distress  Abdominal:      General: There is no distension  Palpations: Abdomen is soft  Tenderness: There is no abdominal tenderness  There is no guarding or rebound  Musculoskeletal:         General: Normal range of motion  Cervical back: Normal range of motion  Right lower leg: No edema  Left lower leg: No edema  Neurological:      General: No focal deficit present       " Mental Status: She is alert  Mental status is at baseline  Skin:     General: Skin is warm and dry  Psychiatric:         Mood and Affect: Mood normal          Behavior: Behavior normal    Exam conducted with a chaperone present  Lab Results:   No visits with results within 1 Day(s) from this visit  Latest known visit with results is:   Appointment on 2023   Component Date Value   • HCG, Quant 2023 23,036 (H)                IMPRESSION:     Single intrauterine pregnancy of 7 weeks 1 day gestational age  Fetal cardiac activity detected at 141bpm  No adnexal masses seen  Left corpus luteum      ASSESSMENT    Fany Spivey is a 32 y o   with LMP 23, with ultrasound 23 showing gestational sac around 6 weeks, with ultrasound today showing IUP at 7 weeks 1 days  Per ACOG guidelines, will date by ultrasound today  MALIA 23 by first trimester ultrasound, currently at 7 weeks 1 days    PLAN  1  Discussed expectations for pregnancy, including expectations for prenatal visits and ultrasounds  Reviewed that will have visits both with her Ob and with MFM  Discussed that obstetric services are centered at [facility], and that this is where she should expect to deliver  Reviewed briefly that there is a 24 help line for emergencies and that [facility] will be able to provide ancillary obstetric services for any other emergency care during this pregnancy  2  Return to care for nursing intake and H&P  Prenatal panel, Hep C, and MFM referral placed  3  B6 and unisom ordered for nausea and vomiting of pregnancy  4  Discussed history of HSV  She reports she is usually on daily suppression  Advised it is safe in pregnancy, advised can continue to take daily suppression    5  Will continue to review prior obstetric history given that patient denies history of miscarriage that was reported during her last pregnancy    All questions were answered & patient expressed understanding      Patient was seen and discussed with Dr Chauncey Bauer MD  OBGYN R-4  5/11/2023

## 2023-05-11 ENCOUNTER — ULTRASOUND (OUTPATIENT)
Dept: OBGYN CLINIC | Facility: CLINIC | Age: 28
End: 2023-05-11

## 2023-05-11 VITALS
HEIGHT: 58 IN | SYSTOLIC BLOOD PRESSURE: 106 MMHG | WEIGHT: 112.8 LBS | BODY MASS INDEX: 23.68 KG/M2 | DIASTOLIC BLOOD PRESSURE: 62 MMHG

## 2023-05-11 DIAGNOSIS — Z3A.01 LESS THAN 8 WEEKS GESTATION OF PREGNANCY: ICD-10-CM

## 2023-05-11 DIAGNOSIS — Z34.91 FIRST TRIMESTER PREGNANCY: ICD-10-CM

## 2023-05-11 DIAGNOSIS — N91.2 AMENORRHEA: Primary | ICD-10-CM

## 2023-05-11 DIAGNOSIS — O21.9 NAUSEA AND VOMITING DURING PREGNANCY: ICD-10-CM

## 2023-05-11 PROBLEM — B37.31 VAGINAL YEAST INFECTION: Status: RESOLVED | Noted: 2021-08-05 | Resolved: 2023-05-11

## 2023-05-11 RX ORDER — PYRIDOXINE HCL (VITAMIN B6) 25 MG
25 TABLET ORAL DAILY
Qty: 60 TABLET | Refills: 2 | Status: SHIPPED | OUTPATIENT
Start: 2023-05-11

## 2023-05-13 ENCOUNTER — APPOINTMENT (OUTPATIENT)
Dept: LAB | Facility: HOSPITAL | Age: 28
End: 2023-05-13
Payer: COMMERCIAL

## 2023-05-13 DIAGNOSIS — Z3A.01 LESS THAN 8 WEEKS GESTATION OF PREGNANCY: ICD-10-CM

## 2023-05-13 LAB
ABO GROUP BLD: NORMAL
AMORPH PHOS CRY URNS QL MICRO: ABNORMAL /HPF
BACTERIA UR QL AUTO: ABNORMAL /HPF
BASOPHILS # BLD AUTO: 0.04 THOUSANDS/ÂΜL (ref 0–0.1)
BASOPHILS NFR BLD AUTO: 1 % (ref 0–1)
BILIRUB UR QL STRIP: NEGATIVE
BLD GP AB SCN SERPL QL: NEGATIVE
CLARITY UR: ABNORMAL
COLOR UR: YELLOW
EOSINOPHIL # BLD AUTO: 0.07 THOUSAND/ÂΜL (ref 0–0.61)
EOSINOPHIL NFR BLD AUTO: 1 % (ref 0–6)
ERYTHROCYTE [DISTWIDTH] IN BLOOD BY AUTOMATED COUNT: 13.1 % (ref 11.6–15.1)
GLUCOSE UR STRIP-MCNC: NEGATIVE MG/DL
HCT VFR BLD AUTO: 36.1 % (ref 34.8–46.1)
HGB BLD-MCNC: 12.3 G/DL (ref 11.5–15.4)
HGB UR QL STRIP.AUTO: NEGATIVE
IMM GRANULOCYTES # BLD AUTO: 0.03 THOUSAND/UL (ref 0–0.2)
IMM GRANULOCYTES NFR BLD AUTO: 0 % (ref 0–2)
KETONES UR STRIP-MCNC: NEGATIVE MG/DL
LEUKOCYTE ESTERASE UR QL STRIP: ABNORMAL
LYMPHOCYTES # BLD AUTO: 2.34 THOUSANDS/ÂΜL (ref 0.6–4.47)
LYMPHOCYTES NFR BLD AUTO: 29 % (ref 14–44)
MCH RBC QN AUTO: 29.4 PG (ref 26.8–34.3)
MCHC RBC AUTO-ENTMCNC: 34.1 G/DL (ref 31.4–37.4)
MCV RBC AUTO: 86 FL (ref 82–98)
MONOCYTES # BLD AUTO: 0.45 THOUSAND/ÂΜL (ref 0.17–1.22)
MONOCYTES NFR BLD AUTO: 6 % (ref 4–12)
MUCOUS THREADS UR QL AUTO: ABNORMAL
NEUTROPHILS # BLD AUTO: 5.2 THOUSANDS/ÂΜL (ref 1.85–7.62)
NEUTS SEG NFR BLD AUTO: 63 % (ref 43–75)
NITRITE UR QL STRIP: NEGATIVE
NON-SQ EPI CELLS URNS QL MICRO: ABNORMAL /HPF
NRBC BLD AUTO-RTO: 0 /100 WBCS
PH UR STRIP.AUTO: 8.5 [PH]
PLATELET # BLD AUTO: 302 THOUSANDS/UL (ref 149–390)
PMV BLD AUTO: 8.5 FL (ref 8.9–12.7)
PROT UR STRIP-MCNC: NEGATIVE MG/DL
RBC # BLD AUTO: 4.19 MILLION/UL (ref 3.81–5.12)
RBC #/AREA URNS AUTO: ABNORMAL /HPF
RH BLD: POSITIVE
RUBV IGG SERPL IA-ACNC: 38.7 IU/ML
SP GR UR STRIP.AUTO: 1.01 (ref 1–1.03)
SPECIMEN EXPIRATION DATE: NORMAL
UROBILINOGEN UR QL STRIP.AUTO: 0.2 E.U./DL
WBC # BLD AUTO: 8.13 THOUSAND/UL (ref 4.31–10.16)
WBC #/AREA URNS AUTO: ABNORMAL /HPF

## 2023-05-13 PROCEDURE — 86762 RUBELLA ANTIBODY: CPT

## 2023-05-13 PROCEDURE — 86706 HEP B SURFACE ANTIBODY: CPT

## 2023-05-13 PROCEDURE — 87086 URINE CULTURE/COLONY COUNT: CPT

## 2023-05-13 PROCEDURE — 86803 HEPATITIS C AB TEST: CPT

## 2023-05-13 PROCEDURE — 86900 BLOOD TYPING SEROLOGIC ABO: CPT

## 2023-05-13 PROCEDURE — 86901 BLOOD TYPING SEROLOGIC RH(D): CPT

## 2023-05-13 PROCEDURE — 87340 HEPATITIS B SURFACE AG IA: CPT

## 2023-05-13 PROCEDURE — 86780 TREPONEMA PALLIDUM: CPT

## 2023-05-13 PROCEDURE — 85025 COMPLETE CBC W/AUTO DIFF WBC: CPT

## 2023-05-13 PROCEDURE — 36415 COLL VENOUS BLD VENIPUNCTURE: CPT

## 2023-05-13 PROCEDURE — 87389 HIV-1 AG W/HIV-1&-2 AB AG IA: CPT

## 2023-05-13 PROCEDURE — 86850 RBC ANTIBODY SCREEN: CPT

## 2023-05-14 LAB
BACTERIA UR CULT: NORMAL
HBV SURFACE AB SER-ACNC: 4.39 MIU/ML
HBV SURFACE AG SER QL: NORMAL
HCV AB SER QL: NORMAL
HIV 1+2 AB+HIV1 P24 AG SERPL QL IA: NORMAL
HIV 2 AB SERPL QL IA: NORMAL
HIV1 AB SERPL QL IA: NORMAL
HIV1 P24 AG SERPL QL IA: NORMAL
TREPONEMA PALLIDUM IGG+IGM AB [PRESENCE] IN SERUM OR PLASMA BY IMMUNOASSAY: NORMAL

## 2023-05-15 ENCOUNTER — TELEPHONE (OUTPATIENT)
Dept: OBGYN CLINIC | Facility: CLINIC | Age: 28
End: 2023-05-15

## 2023-05-15 PROBLEM — Z78.9 NONIMMUNE TO HEPATITIS B VIRUS: Status: ACTIVE | Noted: 2023-05-15

## 2023-05-15 NOTE — TELEPHONE ENCOUNTER
ID [id number]    Attempted to call, left a message asking for patient to call the office  Office number provided

## 2023-05-15 NOTE — TELEPHONE ENCOUNTER
----- Message from Yuridia Flaherty MD sent at 5/15/2023  9:17 AM EDT -----  Prenatal panel reviewed- normal, but Hep B non-immune  She can consider the vaccine series through her PCP during this pregnancy

## 2023-05-15 NOTE — TELEPHONE ENCOUNTER
Patient returned phone call and was informed of normal prenatal panel and was advised speaking with primary care doctor to consider Hep B vaccine, patient verbalized understanding

## 2023-05-30 ENCOUNTER — HOSPITAL ENCOUNTER (EMERGENCY)
Facility: HOSPITAL | Age: 28
Discharge: HOME | End: 2023-05-30
Attending: EMERGENCY MEDICINE | Admitting: EMERGENCY MEDICINE

## 2023-05-30 ENCOUNTER — APPOINTMENT (EMERGENCY)
Dept: ULTRASOUND IMAGING | Facility: HOSPITAL | Age: 28
End: 2023-05-30

## 2023-05-30 VITALS
RESPIRATION RATE: 16 BRPM | SYSTOLIC BLOOD PRESSURE: 94 MMHG | TEMPERATURE: 98.1 F | DIASTOLIC BLOOD PRESSURE: 56 MMHG | HEART RATE: 63 BPM | OXYGEN SATURATION: 99 %

## 2023-05-30 DIAGNOSIS — O20.9 VAGINAL BLEEDING IN PREGNANCY, FIRST TRIMESTER: Primary | ICD-10-CM

## 2023-05-30 LAB
ABO GROUP BLD: NORMAL
APTT PPP: 29 SECONDS (ref 23–37)
B-HCG SERPL-ACNC: ABNORMAL MIU/ML (ref 0–5)
BASOPHILS # BLD AUTO: 0.05 THOUSANDS/ÂΜL (ref 0–0.1)
BASOPHILS NFR BLD AUTO: 1 % (ref 0–1)
BILIRUB UR QL STRIP: NEGATIVE
BLD GP AB SCN SERPL QL: NEGATIVE
CLARITY UR: NORMAL
COLOR UR: YELLOW
EOSINOPHIL # BLD AUTO: 0.13 THOUSAND/ÂΜL (ref 0–0.61)
EOSINOPHIL NFR BLD AUTO: 2 % (ref 0–6)
ERYTHROCYTE [DISTWIDTH] IN BLOOD BY AUTOMATED COUNT: 13.6 % (ref 11.6–15.1)
EXT PREGNANCY TEST URINE: POSITIVE
EXT. CONTROL: ABNORMAL
GLUCOSE UR STRIP-MCNC: NEGATIVE MG/DL
HCT VFR BLD AUTO: 38.8 % (ref 34.8–46.1)
HGB BLD-MCNC: 12.7 G/DL (ref 11.5–15.4)
HGB UR QL STRIP.AUTO: NEGATIVE
IMM GRANULOCYTES # BLD AUTO: 0.03 THOUSAND/UL (ref 0–0.2)
IMM GRANULOCYTES NFR BLD AUTO: 0 % (ref 0–2)
INR PPP: 1.04 (ref 0.84–1.19)
KETONES UR STRIP-MCNC: NEGATIVE MG/DL
LEUKOCYTE ESTERASE UR QL STRIP: NEGATIVE
LYMPHOCYTES # BLD AUTO: 2.55 THOUSANDS/ÂΜL (ref 0.6–4.47)
LYMPHOCYTES NFR BLD AUTO: 32 % (ref 14–44)
MCH RBC QN AUTO: 29 PG (ref 26.8–34.3)
MCHC RBC AUTO-ENTMCNC: 32.7 G/DL (ref 31.4–37.4)
MCV RBC AUTO: 89 FL (ref 82–98)
MONOCYTES # BLD AUTO: 0.52 THOUSAND/ÂΜL (ref 0.17–1.22)
MONOCYTES NFR BLD AUTO: 7 % (ref 4–12)
NEUTROPHILS # BLD AUTO: 4.67 THOUSANDS/ÂΜL (ref 1.85–7.62)
NEUTS SEG NFR BLD AUTO: 58 % (ref 43–75)
NITRITE UR QL STRIP: NEGATIVE
NRBC BLD AUTO-RTO: 0 /100 WBCS
PH UR STRIP.AUTO: 8 [PH]
PLATELET # BLD AUTO: 289 THOUSANDS/UL (ref 149–390)
PMV BLD AUTO: 9 FL (ref 8.9–12.7)
PROT UR STRIP-MCNC: NEGATIVE MG/DL
PROTHROMBIN TIME: 13.8 SECONDS (ref 11.6–14.5)
RBC # BLD AUTO: 4.38 MILLION/UL (ref 3.81–5.12)
RH BLD: POSITIVE
SP GR UR STRIP.AUTO: 1.02 (ref 1–1.03)
SPECIMEN EXPIRATION DATE: NORMAL
UROBILINOGEN UR STRIP-ACNC: <2 MG/DL
WBC # BLD AUTO: 7.95 THOUSAND/UL (ref 4.31–10.16)

## 2023-05-30 RX ADMIN — SODIUM CHLORIDE 500 ML: 0.9 INJECTION, SOLUTION INTRAVENOUS at 08:00

## 2023-05-30 NOTE — ED ATTENDING ATTESTATION
2023  I, Scot Gramajo MD, saw and evaluated the patient  I have discussed the patient with the resident/non-physician practitioner and agree with the resident's/non-physician practitioner's findings, Plan of Care, and MDM as documented in the resident's/non-physician practitioner's note, except where noted  All available labs and Radiology studies were reviewed  I was present for key portions of any procedure(s) performed by the resident/non-physician practitioner and I was immediately available to provide assistance  At this point I agree with the current assessment done in the Emergency Department  I have conducted an independent evaluation of this patient a history and physical is as follows: Patient is a 32year old female with vaginal bleeding this AM with mild pain  No N/V  No fever  No urinary sx  Patient states she is 9 weeks and 5 days pregnant  Was last seen in this ED on 21 for PICA in adult  [facility] website checked on this patient and no Rx found  NCAT  Moist mucous membranes  Oropharynx clear  No conjunctival pallor  Lungs clear  Heart regular without murmur  Abdomen soft and nontender  Good bowel sounds  No edema  No rash noted  No pallor  DDx including but not limited to: ectopic pregnancy, threatened , missed , incomplete , anemia, coagulopathy, DUB, tumor, retained products of conception, PCOS, UTI; doubt ovarian torsion or ruptured ovarian cyst or acute surgical intraabdominal process  Will check labs and formal OB US   on POCUS as per ED resident, Dr Gato Dooley       ED Course         Critical Care Time  Procedures

## 2023-05-30 NOTE — ED PROVIDER NOTES
History  Chief Complaint   Patient presents with   • Vaginal Bleeding - Pregnant     Pt is 9 weeks and 5 days pregnant and started vaginally bleeding this morning  When pt stands, there is minimal bleeding, c/o cramping and nausea        used: Yes     The patient is a  who follows with [facility] at approximately 9w5d who presents to the ED for evaluation of light vaginal bleeding x1 day  Noticed this morning there was a small amount of bright red blood on the toilet paper when she wiped, and a few drops in the toilet  She is having some lower abdominal cramping that is mild and 'normal' for her  Denies loss of fluids  Denies dysuria, hematuria  No recent sexual activity  Denies abnormal vaginal discharge  She had an [address] on 4/27 that confirmed IUP  Denies any fevers  Has been having some daily nausea so her fluid intake has been decreased  She was given zofran for PRN use but doesn't feel that it helps  Prior to Admission Medications   Prescriptions Last Dose Informant Patient Reported?  Taking?   acetaminophen (TYLENOL) 325 mg tablet   No No   Sig: Take 2 tablets (650 mg total) by mouth every 6 (six) hours as needed for mild pain   calcium carbonate (TUMS) 500 mg chewable tablet   No No   Sig: Chew 2 tablets (1,000 mg total) daily as needed for indigestion or heartburn   docusate sodium (COLACE) 100 mg capsule   No No   Sig: Take 1 capsule (100 mg total) by mouth 2 (two) times a day   doxylamine (UNISOM) 25 MG tablet   No No   Sig: Take 0 5 tablets (12 5 mg total) by mouth daily at bedtime as needed for nausea   ferrous sulfate 324 (65 Fe) mg   No No   Sig: Take 1 tablet (324 mg total) by mouth 2 (two) times a day before meals   ferrous sulfate 325 (65 Fe) mg tablet   No No   Sig: Take 1 tablet (325 mg total) by mouth daily   ibuprofen (MOTRIN) 600 mg tablet   No No   Sig: Take 1 tablet (600 mg total) by mouth every 6 (six) hours as needed for moderate pain pyridoxine (B-6) 25 MG tablet   No No   Sig: Take 1 tablet (25 mg total) by mouth daily   simethicone (MYLICON) 80 mg chewable tablet   No No   Sig: Chew 1 tablet (80 mg total) 4 (four) times a day as needed for flatulence   valACYclovir (VALTREX) 500 mg tablet   No No   Sig: Take 1 tablet (500 mg total) by mouth 2 (two) times a day   witch hazel-glycerin (TUCKS) topical pad   No No   Sig: Apply 1 pad topically every 2 (two) hours as needed for irritation      Facility-Administered Medications: None       Past Medical History:   Diagnosis Date   • Herpes     Latent   • Patient denies medical problems        Past Surgical History:   Procedure Laterality Date   • NO PAST SURGERIES         Family History   Problem Relation Age of Onset   • No Known Problems Mother    • No Known Problems Father    • No Known Problems Brother    • No Known Problems Daughter    • No Known Problems Brother    • No Known Problems Brother    • No Known Problems Maternal Grandmother    • No Known Problems Maternal Grandfather    • No Known Problems Daughter      I have reviewed and agree with the history as documented  E-Cigarette/Vaping   • E-Cigarette Use Never User      E-Cigarette/Vaping Substances   • Nicotine No    • THC No    • CBD No    • Flavoring No    • Other No    • Unknown No      Social History     Tobacco Use   • Smoking status: Never   • Smokeless tobacco: Never   Vaping Use   • Vaping Use: Never used   Substance Use Topics   • Alcohol use: Not Currently     Comment: socially   • Drug use: No        Review of Systems   Constitutional: Negative for chills and fever  HENT: Negative for ear pain and sore throat  Eyes: Negative for visual disturbance  Respiratory: Negative for cough and shortness of breath  Cardiovascular: Negative for chest pain and leg swelling  Gastrointestinal: Negative for abdominal pain, blood in stool, constipation, diarrhea, nausea and vomiting     Genitourinary: Positive for pelvic pain (cramping) and vaginal bleeding  Negative for dysuria and hematuria  Musculoskeletal: Negative for neck pain and neck stiffness  Neurological: Negative for dizziness, syncope, weakness and light-headedness  All other systems reviewed and are negative  Physical Exam  ED Triage Vitals [05/30/23 0644]   Temperature Pulse Respirations Blood Pressure SpO2   98 1 °F (36 7 °C) 61 20 113/64 97 %      Temp Source Heart Rate Source Patient Position - Orthostatic VS BP Location FiO2 (%)   Oral Monitor Sitting Right arm --      Pain Score       --             Orthostatic Vital Signs  Vitals:    05/30/23 0644   BP: 113/64   Pulse: 61   Patient Position - Orthostatic VS: Sitting       Physical Exam  Vitals and nursing note reviewed  Constitutional:       General: She is not in acute distress  Appearance: She is well-developed  HENT:      Head: Normocephalic and atraumatic  Eyes:      Conjunctiva/sclera: Conjunctivae normal    Cardiovascular:      Rate and Rhythm: Normal rate and regular rhythm  Heart sounds: No murmur heard  Pulmonary:      Effort: Pulmonary effort is normal  No respiratory distress  Abdominal:      General: There is no distension  Palpations: Abdomen is soft  Tenderness: There is abdominal tenderness (mild lower abd/pelvis)  There is no guarding or rebound  Genitourinary:     Exam position: Supine  Labia:         Right: No lesion  Left: No lesion  Urethra: No prolapse or urethral swelling  Vagina: Normal  No vaginal discharge  Comments: Unable to visualize the os on speculum exam  There is a very scant amount of dark blood in the vaginal canal  No bright red blood, no signs of active bleeding  Musculoskeletal:         General: No swelling  Cervical back: Normal range of motion and neck supple  Right lower leg: No edema  Left lower leg: No edema  Skin:     General: Skin is warm and dry        Capillary Refill: Capillary refill takes less than 2 seconds  Findings: No rash  Neurological:      Mental Status: She is alert and oriented to person, place, and time     Psychiatric:         Mood and Affect: Mood normal          Behavior: Behavior normal          ED Medications  Medications   sodium chloride 0 9 % bolus 500 mL (500 mL Intravenous New Bag 5/30/23 0800)       Diagnostic Studies  Results Reviewed     Procedure Component Value Units Date/Time    Protime-INR [[id number]]  (Normal) Collected: 05/30/23 0759    Lab Status: Final result Specimen: Blood from Arm, Right Updated: 05/30/23 0842     Protime 13 8 seconds      INR 1 04    APTT [[id number]]  (Normal) Collected: 05/30/23 0759    Lab Status: Final result Specimen: Blood from Arm, Right Updated: 05/30/23 0842     PTT 29 seconds     CBC and differential [[id number]] Collected: 05/30/23 0759    Lab Status: Final result Specimen: Blood from Arm, Right Updated: 05/30/23 0826     WBC 7 95 Thousand/uL      RBC 4 38 Million/uL      Hemoglobin 12 7 g/dL      Hematocrit 38 8 %      MCV 89 fL      MCH 29 0 pg      MCHC 32 7 g/dL      RDW 13 6 %      MPV 9 0 fL      Platelets 414 Thousands/uL      nRBC 0 /100 WBCs      Neutrophils Relative 58 %      Immat GRANS % 0 %      Lymphocytes Relative 32 %      Monocytes Relative 7 %      Eosinophils Relative 2 %      Basophils Relative 1 %      Neutrophils Absolute 4 67 Thousands/µL      Immature Grans Absolute 0 03 Thousand/uL      Lymphocytes Absolute 2 55 Thousands/µL      Monocytes Absolute 0 52 Thousand/µL      Eosinophils Absolute 0 13 Thousand/µL      Basophils Absolute 0 05 Thousands/µL     UA w Reflex to Microscopic w Reflex to Culture [[id number]] Collected: 05/30/23 5555    Lab Status: Final result Specimen: Urine, Clean Catch Updated: 05/30/23 0819     Color, UA Yellow     Clarity, UA Extra Turbid     Specific [place], UA 1 018     pH, UA 8 0     Leukocytes, UA Negative     Nitrite, UA Negative     Protein, UA Negative mg/dl      Glucose, UA Negative mg/dl      Ketones, UA Negative mg/dl      Urobilinogen, UA <2 0 mg/dl      Bilirubin, UA Negative     Occult Blood, UA Negative     URINE COMMENT --    Urine culture [[id number]] Collected: 05/30/23 0808    Lab Status: In process Specimen: Urine, Clean Catch Updated: 05/30/23 0819    Chlamydia/GC amplified DNA by PCR [[id number]] Collected: 05/30/23 0809    Lab Status: In process Specimen: Urine, Other Updated: 05/30/23 0815    POCT pregnancy, urine [[id number]]  (Abnormal) Resulted: 05/30/23 0814    Lab Status: Final result Updated: 05/30/23 0814     EXT Preg Test, Ur Positive     Control Valid    hCG, quantitative [[id number]] Collected: 05/30/23 0759    Lab Status: In process Specimen: Blood from Arm, Right Updated: 05/30/23 0809                 US OB < 14 weeks with transvaginal    (Results Pending)         Procedures  POC Pelvic US    Date/Time: 5/30/2023 8:18 AM    Performed by: Aravind Nazario MD  Authorized by: Aravind Nazario MD    Patient location:  ED  Other Assisting Provider: No    Procedure details:     Exam Type:  Diagnostic    Indications: evaluate for IUP, pregnant with abdominal pain and pregnant with pelvic pain      Assessment for: evaluate fetal viability      Technique:  Transabdominal obstetric (HCG+) exam    Image quality: diagnostic      Image availability:  Images available in PACS  Uterine findings:     Single gestation: identified      Fetal heart rate: identified (167)      Fetal heart rate (bpm):  167  Interpretation:     Pregnancy findings: intrauterine pregnancy (IUP)            ED Course  ED Course as of 05/30/23 0844   Tue May 30, 2023   0820 Leukocytes, UA: Negative   0823 Nitrite, UA: Negative   0823 Blood, UA: Negative   0823  by bedside US   0830 S/O to Dr Meg Avila pending TV US and final read                             SBIRT 22yo+    Flowsheet Row Most Recent Value   Initial Alcohol Screen: US AUDIT-C     1  How often do you have a drink containing alcohol? 0 Filed at: 05/30/2023 0809   2  How many drinks containing alcohol do you have on a typical day you are drinking? 0 Filed at: 05/30/2023 0809   3b  FEMALE Any Age, or MALE 65+: How often do you have 4 or more drinks on one occassion? 0 Filed at: 05/30/2023 0809   Audit-C Score 0 Filed at: 05/30/2023 6088   DOMINGO: How many times in the past year have you    Used an illegal drug or used a prescription medication for non-medical reasons? Never Filed at: 05/30/2023 3375                Medical Decision Making  The patient is a 27yo [id number] who [facility] 9[facility] who presents to the ED for evaluation of light vaginal bleeding x1 day  Here, the patient is hemodynamically stable and afebrile, non tachycardic with normal wob, satting 97% on RA  She is alert and oriented x3, speaking to me in full sentences  Not in acute distress  She is mild lower abd/pelvic tenderness, no rebound or guarding, abd is soft  Unable to visualize the cervical os on speculum exam  There is a scant amount of dark blood in the vaginal canal     Hgb 12 7  No evidence of infection on UA  Plan for transvaginal US  The patient is signed out to Dr Gabby Barclay pending US and read  The patient is notified of the transition of care  Amount and/or Complexity of Data Reviewed  Labs: ordered  Decision-making details documented in ED Course  Radiology: ordered  Disposition  Final diagnoses:   Vaginal bleeding in pregnancy, first trimester     Time reflects when diagnosis was documented in both MDM as applicable and the Disposition within this note     Time User Action Codes Description Comment    5/30/2023  8:37 AM Madge Barthel Add [O20 9] Vaginal bleeding in pregnancy, first trimester       ED Disposition     None      Follow-up Information    None         Patient's Medications   Discharge Prescriptions    No medications on file     No discharge procedures on file      PDMP Review       Value Time User    PDMP Reviewed  Yes 5/30/2023  7:08 AM Tootie Boyd MD           ED Provider  Attending physically available and evaluated Rose HOLLIDAY managed the patient along with the ED Attending      Electronically Signed by         Rupert Stevens MD  05/30/23 1713 Linnette Hartley MD  05/30/23 0626

## 2023-05-31 ENCOUNTER — INITIAL PRENATAL (OUTPATIENT)
Dept: OBGYN CLINIC | Facility: CLINIC | Age: 28
End: 2023-05-31

## 2023-05-31 VITALS
DIASTOLIC BLOOD PRESSURE: 69 MMHG | WEIGHT: 109.6 LBS | HEART RATE: 63 BPM | HEIGHT: 58 IN | SYSTOLIC BLOOD PRESSURE: 105 MMHG | BODY MASS INDEX: 23 KG/M2

## 2023-05-31 DIAGNOSIS — Z59.41 FOOD INSECURITY: ICD-10-CM

## 2023-05-31 DIAGNOSIS — Z59.82 TRANSPORTATION INSECURITY: ICD-10-CM

## 2023-05-31 DIAGNOSIS — Z59.9 FINANCIAL DIFFICULTIES: Primary | ICD-10-CM

## 2023-05-31 LAB
BACTERIA UR CULT: NORMAL
C TRACH DNA SPEC QL NAA+PROBE: NEGATIVE
N GONORRHOEA DNA SPEC QL NAA+PROBE: NEGATIVE

## 2023-05-31 RX ORDER — VALACYCLOVIR HYDROCHLORIDE 1 G/1
1000 TABLET, FILM COATED ORAL DAILY
COMMUNITY
Start: 2023-05-16

## 2023-05-31 SDOH — ECONOMIC STABILITY - TRANSPORTATION SECURITY: TRANSPORTATION INSECURITY: Z59.82

## 2023-05-31 SDOH — ECONOMIC STABILITY - FOOD INSECURITY: FOOD INSECURITY: Z59.41

## 2023-05-31 SDOH — ECONOMIC STABILITY - INCOME SECURITY: PROBLEM RELATED TO HOUSING AND ECONOMIC CIRCUMSTANCES, UNSPECIFIED: Z59.9

## 2023-05-31 NOTE — PROGRESS NOTES
OB INTAKE INTERVIEW  Pt presents for OB intake via  # [id number] & [id number]  [id number]  OB History    Para Term  AB Living   5 3 2 1 1 3   SAB IAB Ectopic Multiple Live Births   1     0 3      # Outcome Date GA Lbr Neal/2nd Weight Sex Delivery Anes PTL Lv   5 Current            4  21 32w3d / 00:07  F Vag-Breech None Y KATH   3 SAB 10/2019           2 Term 11 40w0d   F Vag-Spont None  KATH   1 Term 03/28/10    F Vag-Spont None  KATH     Hx of  delivery prior to 36 weeks 6 days:  Yes   If yes, place a referral for cervical surveillance at 16 weeks  Last Menstrual Period:    Patient's last menstrual period was 2023 (approximate)  Ultrasound date: 2023  7 weeks 1 days     Estimated Date of Delivery: 23   by US  H&P visit scheduled  6/15/2023 @ 1500  with Dr Bertrand Snyder      Last pap smear: unknown date  Findings; lab pap smear results: no abnormalities    Current Issues:  Constipation :   No  Headaches :   No  Cramping:  No  Spotting :   No  PICA cravings :  No  FOB Involved:   Yes  Planned pregnancy:  Yes  I have these concerns about this prenatal patient:    breech vaginal delivery at 32 weeks - referral previously ordered by provider for MFM   Recent ED visit for bleeding - pt denied bleeding at this time - ED follow-up and weight loss appt made for 2023 @ 1400   Interview education  • [facility] Pregnancy Essentials reviewed and discussed   • Baby and [address] Handout  • [facility] MFM Handout  • Discussed genetic testing - pt is interested at this time  • Prenatal lab work: Scripts printed and given to pt  • Influenza vaccine given today: N/A  • Discussed COVID vaccine - pt is not interested at this time   • Discussed Tdap vaccine     Immunizations:   Immunization History   Administered Date(s) Administered   • Influenza, injectable, quadrivalent, preservative free 0 5 mL 2021   • Tdap 2021     Depression Screening Follow-up Plan: Patient's depression screening was negative with an [place] score of  0  Clinically patient does not have depression  No treatment is required     Nurse/Family Partnership- referral placed:  N/A   If yes, place referral for nurse family partnership  BMI Counseling: Body mass index is 23 31 kg/m²  Tobacco Cessation Counseling: non-smoker  The numerous health risks of tobacco consumption were discussed  Infection Screening: Does the pt have a hx of MRSA? No  If yes- please follow MRSA protocol and obtain a nasal swab for MRSA culture  The patient was oriented to our practice and all questions were answered    Interviewed by: Ethel Johansen, RN 05/31/23

## 2023-05-31 NOTE — LETTER
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[place] [address]   We recommend that all pregnant women:    1  Have a well-ventilated workspace  2  Wear low-heeled shoes  3  Work no more than 40 hours per week  4  Have a 15 minute break every 2 hours and at least 30 minutes for a meal break  5  Use good body mechanics by bending at your knees to avoid back strain and lift no more than 20 pounds without assistance  Will need assistance with lifting over 20 lbs  6  Have ready access to bathrooms and water  She may continue to work until her due date unless medical complications arise  We anticipate she may return to work in 6-8 weeks after delivery       Sincerely,  [facility]  [address], [address] [facility]  [place]  [address]   OFFICE:  [phone]    [place]  [address]  [address]  OFFICE:  [phone]

## 2023-05-31 NOTE — PATIENT INSTRUCTIONS
[address] [facility] decirle ¡Felicitaciones por [place]! Nos complace que nos haya elegido para ser hurt proveedor de cyrus servicios de izabela médica delores hurt Amarjit Grossman  Hurt izabela, la izabela de hurt hijo por nacer (niños) y hurt hsira son importante para nosotros  Ahora, más que [place], hurt izabela será lo más importante para usted  El crecimiento y el progreso de hurt bebé pueden verse afectados por la forma en que usted se cuide  Es Lorrin Inches buena idea planificar con anticipación  Es un hecho conocido que las mujeres que reciben atención mèdica desde temprano en  [facility] y delores todo el embarazo tienen bebés más saludables  Se programarán visitas para usted delores todo [facility]  Es hurt deber cumplir con cyrus citas y seguir las instrucciones para hurt cuidado  El [place] de visitas será decidido por hurt médico  No tengas miedo de hacer preguntas  Hable con cyrus enfermeras y proveedores sobre cyrus planes de parto y cualquier inquietud que pueda tener  [facility] Alert de [address] [facility] al [phone] para programar hurt ruma  Ruma de 1 hora, solo se permite 1 persona de apoyo, NO niños    Análisis de moses: complete antes de hurt ruma de H&P  NO tiene que ayunar para ningún análisis de moses a menos que se lo indiquen  CBC, Tipo de Gilmar Queen y 200 Exempla Craig de anticuerpos, Análisis de [place] posadas, [place] de glucosa al red, VIH, sífilis, hepatitis B    Historia y física: ruma de H&P  examen físico  Examen pélvico: prueba de Gilles Parsley y Clamidia  Si es necesario: Papanicolaou    Plan:  Visitas prenatales de rutina cada 4 semanas hasta las 28 semanas  En cyrus visitas prenatales:  Monitoreo de los el latidos del corazón del bebé y medir hurt guanako en crecimiento, Recolecte arnold Radha Deshpande, y se tomara hurt peso y presión arterial      Señales de [place] en el embarazo: Luca Gupta  [employer] [phone] or  OSLO Office:  [phone]    1  Sangrado vaginal  2   Dolor abdominal keara que no desaparece  3  Fiebre (más de 100 4 y no se shell con Tylenol)  4  Vómitos persistentes que calderon más de 24 horas  5  dolor de pecho  6  Dolor o ardor al orinar  7  Dolor de jigna severo que no se resuelve con Tylenol  8  Visión borrosa o neelam puntos en marshall visión  9  Hinchazón repentina de marshall omer o andrés  10  Enrojecimiento, hinchazón o dolor en arnold pierna  11  Un aumento de peso repentino en pocos días  12  Contar los movimientos fetales del bebé  (después de 28 semanas o el sexto mes de embarazo)  15  Arnold pérdida de líquido acuoso de la vagina: puede ser un chorro, un goteo o arnold humedad continua  14  Después de 20 semanas de embarazo, calambres rítmicos (más de 4 por hora) o menstruales aristeo dolor bajo / pélvico      Manténgase saludable delores marshall embarazo:   Consuma alimentos saludables y variados  Alimentos saludables incluyen frutas, verduras, panes de wanda integral, alimentos lácteos bajos en grasa, frijoles, donnie magras y pescado  [place] líquidos aristeo se le haya indicado  Pregunte cuánto líquido debe teri cada día y cuáles líquidos son los más adecuados para usted  Cafeína: no está sharan cómo la cafeína afecta el embarazo  Limite marshall consumo de cafeína para evitar posibles problemas de izabela  Limite la cafeína a menos de 200 miligramos por día  La cafeína se puede encontrar en el café, el té, la cola, las bebidas deportivas y el chocolate  Alimentos que contienen ryne: el ryne se encuentra naturalmente en viet todos los tipos de pescados y mariscos  Algunos tipos de peces absorben niveles más altos de ryne que pueden ser dañinos para un bebé ashlee  Coma solo pescado y mariscos bajos en ryne  Limite marshall consumo de pescado a 2 porciones por semana  Elija pescado con bajo contenido de ryne, aristeo atún sharan enlatado, camarones, cangrejo, salmón, bacalao o tilapia    No coma pescado con alto contenido de [employer] pez alyson, el pez azulejo, la caballa real y [place] tib[place]ón  Cada semana, puede comer hasta 12 onzas de pescado o mariscos que tienen bajos niveles de [employer]  Estos incluyen camarones, atún sharan enlatado, salmón, abadejo y [place]  Coma solo 6 onzas de atún carrion (carrion) por semana  El atún carrion tiene más ryne que el atún [place]  [address] [place]  [place]arshall necesidad de ciertas vitaminas y [facility], aristeo el ácido fólico, aumenta delores el [facility]  Las vitaminas prenatales proporcionan algunas de las vitaminas y minerales adicionales que usted necesita  Las vitaminas prenatales también podrían ayudar a disminuir el riesgo de ciertos defectos de nacimiento  Pregunte cuánto peso usted debe aumentar delores marshall [facility]  Demasiado aumento de peso o muy poco puede ser poco saludable para usted y marshall bebé  Ejercicio: hable con marshall proveedor de [employer] ejercicio  El ejercicio moderado puede ayudarlo a mantenerse en forma  Marshall proveedor de [employer] ayudará a planificar un programa de ejercicios que sea seguro para usted delores el [facility]  Brianna muchos líquidos mientras hace ejercicio para mantenerse hidratado  Tenga cuidado para evitar el sobrecalentamiento  EVITE los ejercicios que pueden hacer que pierda el equilibrio  Actividades que pueden poner a marshall bebé en riesgo, es decir, montar a marisa, bucear, esquiar o hacer snowboard  Después de marshall primer trimestre, evite los ejercicios que requieren que se acueste [place]  EVITE exceder arnold frecuencia cardíaca mayor de 140 latidos por minuto  Siempre que pueda mantener arnold conversación mientras hace ejercicio, es probable que marshall ritmo cardíaco sea aceptable  Siempre use el cinturón de seguridad  El cinturón de hombro debe estar sobre el pecho (entre los senos) y lejos del ky    Asegure el cinturón de regazo debajo de marshall vientre para que se ajuste cómodamente en cyrus caderas y hueso pélvico   Realizar el ejercicio de [place]  Imagínese deteniendo el flujo de [facility], contrayendo los músculos de marshall piso pélvico  Mantenga patric contracción delores 10 segundos y suelte  Se pueden repetir 10-20 veces por día  No fume  Si usted fuma, nunca es demasiado tarde para dejar de hacerlo  Fumar aumenta el riesgo de aborto espontáneo y otros problemas de izabela delores marshall Oren Spina  Fumar puede causar que marshall bebé nazca antes de tiempo o que pese menos al nacer  Solicite información a marshall médico si usted necesita ayuda para dejar de fumar  No consuma alcohol  El alcohol pasa de marshall cuerpo al bebé a través de la placenta  Puede afectar el desarrollo del cerebro de marshall bebé y provocar el síndrome de alcoholismo fetal (SAF)  SAF es un nils de condiciones que causan [address], de comportamiento y de crecimiento  Consulte con marshall médico antes de teri cualquier medicamento  Muchos medicamentos pueden perjudicar a marshall bebé si usted los tomas [address] [place] [address] Avenue  No tome ningún medicamento, vitaminas, hierbas o suplementos sin kina consultar con marshall Rosiland Young  use drogas ilegales o de la sandhu (aristeo marihuana o cocaína) mientras está embarazada  Consejos de seguridad:   Evite jacuzzis y saunas  No use un jacuzzi o un sauna mientras usted está embarazada, especialmente delores el primer trimestre  Los [facility] y los saunas aumentan la temperatura de marshall bebé y el riesgo de defectos de nacimiento  Evite la toxoplasmosis  [place] es arnold infección causada por comer carne cruda o estar cerca del excremento de un soraya infectado  [place] puede causar malformaciones congénitas, aborto espontáneo y Sanjeev Schein  Lávese las andrés después de tocar carne cruda  Asegúrese de que la carne esté gema cocida antes de comerla  Evite los huevos crudos y la [place]  Use guantes o pida que alguien la ayude a limpiar la caja de arena del soraya mientras usted [place]  Consulte con marshall médico acerca de viajar    El HAREDING cómodo para viajar es delores el sara trimestre  Pregunte a marshall médico si usted puede viajar después de las 36 semanas  Es posible que no pueda viajar en avión después de las 36 [address]  También le puede recomendar que evite largos viajes por carretera  Programe un control con marshall médico u obstetra según lo indicado:  Vaya a todas cyrus citas prenatales delores marshall embarazo  Anote cyrus preguntas para que se acuerde de hacerlas delores cyrus visitas  [place] y vómito en el embarazo       CUIDADO AMBULATORIO:   La náusea y el vómito en el embarazo  pueden suceder a cualquier hora del día  Estos síntomas usualmente comienzan antes de la semana 9 del embarazo y terminan para la semana 14 (sara trimestre)  Algunas mujeres pueden tener náusea o vómito por un tiempo prolongado  Estos síntomas pueden afectar a algunas mujeres delores el embarazo  La náusea y el vómito no dañan a marshall bebé  Estos síntomas pueden dificultarle cyrus actividades diarias  Pregúntele a marshall Jeneen Poncho vitaminas y minerales son adecuados para usted  Usted vomita más de 4 veces en 1 día  Usted no ha podido retener líquidos en el estómago por más de 1 día  Usted pierde más de 2 libras  Usted tiene fiebre  Cyrus náuseas y vómito continúan por más de 14 semanas  Usted tiene preguntas o inquietudes acerca de marshall condición o cuidado  El tratamiento  para la náusea y el vómito en el embarazo generalmente no es necesario  Usted puede [employer] alimentos que come y en cyrus actividades para ayudar a controlar cyrus síntomas  Es posible que usted necesite probar varias cosas para determinar qué funciona mejor para usted  Hable con marshall médico si cyrus síntomas no mejoran con los cambios que se recomiendan a continuación  Es posible que usted necesite vitamina B6 y medicamento si estos cambios no ayudan o si cyrus síntomas se vuelven graves     Cambios de nutrición que usted puede realizar para controlar la náusea y el vómito:   Coma porciones pequeñas delores el día en [place] de 3 comidas con porciones grandes  Es más probable que usted tenga náusea y vómito cuando marshall estómago está vacío  Consuma alimentos bajos en grasa y ricos en proteínas  Ejemplos son [address] [place]ie, frijoles, pavo y moy sin keyshawn Mayo, aristeo galletas saladas, cereal seco o un sandwich chico antes de [place]  Coma galletas saladas o pan carlos a antes de levantarse de marshall cama por la mañana  Levántese de la cama lentamente  Los movimientos repentinos podrían provocarle mareos y [place]  Consuma alimentos blandos cuando se sienta con náuseas  Ejemplos de alimentos blandos son el pan carlos a, cereal seco, pasta sin Michele Males y stroud  Otros alimentos blandos incluyen a las [employer], plátanos, gelatina y pretzels  Evite los alimentos condimentados, grasosos y fritos  Evite otros alimentos que le provoquen náuseas  [place] líquidos que contengan gengibre  [place] refresco de gengibre hecho con gengibre real o té de gengibre hecho con gengibre fresco rallado  Las [employer] o dulces de gengibre también podrían ayudar a aliviar la náusea y el vómito  [place] líquidos entre alimentos en vez de tomarlos con los alimentos  Espere al menos 30 minutos después de comer para teri líquidos  [place] cantidades pequeñas de líquidos con frecuencia delores el día para evitar la deshidratación  Consulte cuál es la cantidad de líquido que usted debería consumir al día  Otros cambios que usted puede realizar para controlar la náusea y el vómito:   Evite los olores que la [place]  Los olores travis podrían provocar que [employer] náuseas y el vómito, o podrían empeorarlo  Camine un poco, prenda un ventilador o trate de dormir con la ventana abierta para respirar aire fresco  Cuando esté cocinando, mukesh las ventanas para eliminar el olor que podría provocarle náuseas  No se cepille cyrus dientes inmediatamente después de comer  si eso le provoca náuseas  Descanse cuando lo necesite    Comience Alpha Juan actividad lentamente y vuelva a marshall rutina normal conforme se empiece a sentir mejor  Hable con marshall médico acerca de las vitaminas prenatales  Las vitaminas prenatales pueden provocar náuseas a algunas mujeres  Trate de tomárselas por la noche o con un bocadillo  Si hemalatha cambio no le [facility], marshall médico podría recomendarle un tipo de vitamina diferente  No use ningún medicamento, vitamina o suplemento para controlar cyrus síntomas sin antes consultarlo con marshall médico   Varios medicamentos pueden dañar a marshall bebé que no ha nacido  El ejercicio de ligero a moderado  podría ayudar a aliviar cyrus síntomas  También podría ayudarla a dormir mejor por la noche  Pregunte a marshall médico acerca del mejor plan de ejercicio para usted  Beneficios de la lactancia materna  son menos propensos a desarrollar alergias  Tienen arnold mayor protección contra la meningitis bacteriana  Tienen menos infecciones del oído medio  Tienen menos dificultades de aprendizaje   Tienen menos dificultades de comportamiento  Tienen un coeficiente intelectual más alto  Tienen tasas más bajas de enfermedades respiratorias  Tienen stanley obesidad   Son menos propensos a desarrollar diabetes juvenil y algunos cánceres infantiles  Tienen menos probabilidades de morir por Síndrome de [facility]  Un bebé más saludable significa menos visitas al médico y a la farmacia  La lactancia materna es ecológica  No hay nada que tirar  [employer] materna es gratis; La fórmula puede costar más de $ 1000 delores el primer año de katherine  Hay menos sangrado vaginal inmediatamente después del parto y un retorno más rápido a marshall tamaño anterior al [employer]  Las [place] que [employer] delores un total de 2 años tienen  Arnold disminución del 40% en el riesgo de cáncer de seno  Disminución del riesgo de cáncer de ovario  Tasas más bajas de osteoporosis, diabetes y enfermedades del corazón  Importancia de la lactancia materna exclusiva    Al proporcionar Anais Apple ideal para el crecimiento y desarrollo saludable de los bebés, solo se les alimenta con [place], esto es amamantamiento exclusivo  La lactancia materna [facility] primeros 6 meses es muy importante para mejorar la izabela de un bebé y reducir las enfermedades infantiles  Lactancia materna exclusiva delores los primeros 6 meses  [address] Pediatría recomienda la lactancia materna exclusiva delores los primeros seis meses y la lactancia materna continua con suplementos de sólidos delores los próximos 6 meses  Inicio temprano de la lactancia materna  Las mujeres que alimentan a cyrus bebés dentro de la primera hora de nacimiento se conocen aristeo “inicio temprano de la lactancia materna” y aseguran que el recién nacido reciba calostro  El calostro es rico en anticuerpos y nutrientes esenciales  Compartiendo la habitación  Puede estabilizar la respiración y la frecuencia cardíaca del recién nacido  Reduce el llanto  Mejorar la capacidad del recién nacido para mantener la temperatura y los niveles de azúcar en la moses  Estimulación temprana del sistema inmunitario del bebé  efectos calmantes  Enlace más fácil y rápido  El compartir la habitación promueve conocer a marshall recién nacido  El alojamiento conjunto facilita la lactancia materna  Las mujeres que se alojan con cyrus recién nacidos producen [place] y producen un buen suministro de [employer]  Se hace más fácil reconocer las señales de alimentación del bebé  Los bebés tienen sesiones de lactancia más largas  Las mujeres que comparten habitación con cyrus recién nacidos tienen más probabilidades de amamantar exclusivamente en comparación con las mujeres que están separadas de cyrus recién nacidos  Piel con piel  El contacto piel con piel debe ocurrir independientemente de la preferencia de alimentación de Diogo garcia o el modo de Jamil    Después del parto de marshall bebé, es importante que arnold madre freddy y marshall bebé tengan un contacto ininterrumpido de piel a piel  El contacto piel con piel debe ocurrir inmediatamente después del nacimiento delores al menos arnold o dos horas y [place] después de la primera alimentación para las madres que [place]  El contacto piel con piel significa colocar recién nacidos secos y sin ropa sobre el pecho desnudo de marshall [place], con mantas calientes cubriendo la espalda del bebé  Todos los procedimientos de rutina, aristeo las evaluaciones de [place] y recién nacidos, pueden realizarse delores el contacto piel con piel o pueden retrasarse hasta después del período sensible inmediatamente después del nacimiento  Estamos orgullosos de ofrecer amplios servicios educativos y de apoyo para alentar a las madres a amamantar  Janet servicio ofrece información, educación y [de-identified] para mujeres que sue amamantar  Las [place] pueden dejar un mensaje o arnold pregunta sobre la lactancia en línea en cualquier momento del día  Las enfermeras responderán dentro de las 72 horas  Adrián Saenz de respuesta de lactancia materna es [phone] ([phone])  NO deje mensajes urgentes o emergentes en esta línea de mensaje  Comuníquese con marshall médico Yasmany Grounds si tiene alguna pregunta o inquietud urgente  En flakita de sospecha de arnold afección médica de emergencia, [address] [facility]      Attaching your baby at the Breast (English): [email or web address]    Attaching your baby at the Breast (Mongolian):  [email or web address]      Coronavirus (COVID-19) pregnancy FAQs: Medical experts answer your questions  From [employer] com cy  [email or web address]  bc (courtesy of [facility])  As of 3/18/20  By Asha Chang 39  Medically reviewed by Society for Maternal-Fetal Medicine       We asked parents-to-be to send us their most pressing questions about coronavirus (COVID-19)  Among them: Is it still safe to deliver in a hospital? What if my ob-gyn has the virus? We sent those questions to the top docs at the Society for Maternal-Fetal Medicine  Here are their answers  The coronavirus (COVID-19) pandemic has been declared a national emergency in the [facility] by [employer]  Moms-to-be like you are concerned about everything from getting medicines to managing disruptions at work  But above and beyond any worry about lifestyle changes is a focus on your health and the impact of COVID-19 on your pregnancy  We asked obstetrics doctors who handle the most complicated pregnancy issues to answer your questions about the coronavirus  Here are their responses, provided by Dr Kallie Glover and her colleagues at the Society for [place] [facility]  Am I at more risk for COVID-19 if I'm pregnant? We don't know  Pregnancy does change your immune system in ways that might make you more susceptible to viral respiratory infections like COVID-19  If you become infected, you might also be at higher risk for more severe illness compared to the general population  Although this does not appear to be the case with COVID-19, based on limited data so far, a higher risk has been true for pregnant women with other coronavirus infections (SARS-CoV and MERS-CoV) and other viral respiratory infections, such as flu  It's important to take preventive actions to avoid infection, such as washing your hands often and avoiding people who are sick  How might coronavirus affect my pregnancy? Again, we don't know  Women with other coronavirus infections (such as SARS-CoV) did not have miscarriage or stillbirth at higher rates than the general population    We know that having other respiratory viral infections during pregnancy, such as flu, has been associated with problems like low birth weight and  birth  Also, having a high fever early in pregnancy may increase the risk of certain birth defects  Could I transmit coronavirus to my baby during pregnancy or delivery? We don't know whether you could transmit COVID-19 to your baby before or during delivery  However, among the few case studies of infants born to mothers with COVID-23 published in peer-reviewed literature, none of the infants tested positive for the virus  Also, there was no virus detected in samples of amniotic fluid or breast milk  There have been a few reports of newborns as young as a few days old with infection, suggesting that a mother can transmit the infection to her infant through close contact after delivery  There have been no reports of mother-to-baby transmission for other coronaviruses (MERS-CoV and SARS-CoV), although only limited information is available  Is it safe for me to deliver at a hospital where there have been COVID-19 cases? Yes  We know that COVID-19 is a very scary virus  The good news is that hospitals are taking great precautions to keep patients and healthcare providers safe  When a patient is even suspected to have COVID-19, they are placed in a negative pressure room  (Think of these rooms as vacuums that suck and filter the air so it's safe for the other people in the hospital)  This makes it possible for you to deliver at the hospital without putting you or your baby at risk  Hospitals are also implementing stricter visiting policies to keep patients safe  It's worth calling your hospital to check if there are any new regulations to be aware of  What plans should I make now in case the hospital system is overwhelmed when it's time for me to deliver? This is a great question to talk with your doctor or midwife about when you're 34 to 36 weeks pregnant  Every hospital is making different plans for dealing with this scenario  I work in healthcare   Should I ask my doctor to excuse me from work until the baby is born? What if I work in a school, the travel [employer] 20, or some other high-risk setting? Healthcare facilities should take care to limit the exposure of pregnant employees to patients with confirmed or suspected COVID-19, just as they would with other infectious cases  If you continue working, be sure to follow the CDC's risk assessment and infection control guidelines  If you work in a school, travel [employer] 20, or other high-risk setting, talk with your employer about what it's doing to protect employees and minimize infection risks  Wash your hands often  What if my OB gets COVID-19? If your doctor or midwife tests positive for COVID-19, they will need to be quarantined until they recover and are no longer at risk of transmitting the virus  In this case, you'll be assigned to another OB in your doctor's practice (or you may choose another practitioner yourself)  Ask your new OB or your doctor's office if you should self-quarantine or be tested for the virus  (It will depend on when you last saw your provider and when that person tested positive )    Should we hold off on trying to conceive because of COVID-19? At this time, there's no reason to hold off on trying to get pregnant, but the data we have is really limited  For example, we don't think the virus causes birth defects or increases your risk of miscarriage  But we don't know whether you could transmit COVID-19 to your baby before or during delivery  We also don't know if the virus lives in semen or can be sexually transmitted  We have a babymoon scheduled in the next few months - should we cancel? If you're planning to travel internationally or on a cruise, you should strongly consider canceling  At this time, the virus has reached more than 140 countries, and there are travel bans to [place], most of [place], and [place] ([place] [place]   Places where large numbers of people gather are at highest risk, especially airports and cruise ships  If you're planning travel in the [place] , note that any travel setting increases your risk of exposure, and there may be travel bans even in [place] by the time you're scheduled to go  Even if you're state is not blocked, you'll definitely want to avoid traveling to communities where the virus is spreading  To find out where these places are, check The [place] Times map based on CDC data  For the most current advice to help you avoid exposure, check the CDC's COVID-19 travel page  Will the hospital separate me from my  and keep the baby in quarantine? If you test positive for COVID-19 or have been exposed but have no symptoms, the CDC, [employer] of Obstetricians and Gynecologists, and the Society for [place] all recommend that you be  from your baby to decrease the risk of transmission to the baby  This would last until you are no longer at risk of transmitting the virus  If you do not have COVID-19 and have not been exposed to the virus, the hospital will not separate you from your   My hospital is restricting visitors and only allowing one support person  If my support person leaves after the delivery, will they be allowed to come back? Every hospital has different policies  Contact your hospital or labor and delivery unit a week or so before delivery to get the most up-to-date restrictions  In general, if your support person needs to leave, they would be allowed back unless they knew they were exposed to COVID-19 after leaving your company  [facility] understands that the coronavirus (COVID-19) pandemic is an evolving story and that your questions will change over time  We'll continue asking moms and dads in our Community what they want to know, and we'll get the answers from experts to keep them - and you - informed and supported  My mom was planning to fly here to help me care for my new baby after delivery   Should I tell her not to come?  Yes  If your mom is over 61 or has any serious chronic medical conditions (such as heart disease, lung disease, or diabetes), she is at higher risk of serious illness from COVID-19 and should avoid air travel  And remember that any travel setting increases a person's risk of exposure  So, it may be risky to have her around the baby after she has been traveling  For the most current advice on traveling, check the CDC's COVID-19 travel page  [facility] understands that the coronavirus pandemic is an evolving story and that your questions will change over time  We'll continue asking moms and dads in our Community what they want to know, and we'll get the answers from experts to keep them - and you - informed and supported

## 2023-05-31 NOTE — LETTER
Dentist Letter            05/31/23          Fany Kwan Ajtun  1995  [address] [place] [address]               We have had several requests from local dentist requesting permission to perform procedures on our patients who are pregnant  We wish to respond with this letter regarding some of the more routine procedures that we have been asked about  The following procedures may be performed on our obstetric patients:   1  Administration of local anesthesia   2  Administration of antibiotics such as PCN, Ampicillin, and Erythromycin  3  Administration of pain medications such as Tylenol, Tylenol with codeine, and if needed Percocet  4  Shielded X-rays    Should you have any questions, please do not hesitate to contact at [phone]          Sincerely,    [address] [facility]  [phone]

## 2023-05-31 NOTE — LETTER
Proof of Pregnancy Letter      05/31/23            Fany Loredo  1995  [address] [place] [id number]      Rose is a patient at our facility  Fany Loredo Estimated Date of Delivery: 12/27/23       Any questions or concerns, please feel free to contact our office        Sincerely,      [address] [facility]  [address], [facility], [address]   Office:  [phone]

## 2023-06-01 ENCOUNTER — PATIENT OUTREACH (OUTPATIENT)
Dept: OBGYN CLINIC | Facility: CLINIC | Age: 28
End: 2023-06-01

## 2023-06-14 PROBLEM — O09.899 HISTORY OF PRETERM DELIVERY, CURRENTLY PREGNANT: Status: ACTIVE | Noted: 2023-05-11

## 2023-06-14 NOTE — PROGRESS NOTES
OB/GYN  PRENATAL H&P VISIT  Fany Kwan Ajtun  6/15/2023  3:04 PM  Dr Sacha Stover MD      SUBJECTIVE  Patient is a [id number] at 12w1d here for initial prenatal H&P, dating by first trimester ultrasound  This is an unintended and desired pregnancy  She was seen in the ED for vaginal spotting 23, with ultrasound confirming intrauterine gestation at 9w5d, without evidence of pathologic cause of vaginal bleeding  She is Rh positive  Gonorrhea and Chlamydia were collected at that time and were negative  She has not had any vaginal bleeding since then  She is currently doing well  She is not currently working  She has a history of HSV, and has been on suppressive therapy with Valtrex in the past  She reports last outbreak about 6-8 weeks ago  She has not had hx of other STD/STI, denies a hx of TB or close contacts with persons with TB  She has not had MRSA  She denies a family history of inheritable conditions such as physical or intellectual disabilities, birth defects, blood disorders, heart or neural tube defects  She denies recent travel or travel planned in the near future  She denies use of nicotine or recreational drug use  She denies use of ETOH  She denies vaginal bleeding, cramping, leakage, abnormal discharge  She reports ongoing nausea, but is able to eat  She has a history of miscarriage in 10/2018, after presenting to the office for evaluation of amenorrhea and found to have positive UPT       OB History    Para Term  AB Living   5 3 2 1 1 3   SAB IAB Ectopic Multiple Live Births   1 0 0 0 3      # Outcome Date GA Lbr Neal/2nd Weight Sex Delivery Anes PTL Lv   5 Current            4  21 32w3d / 00:07  F Vag-Breech None Y KATH      Name: Shasta Vaughn (FANY)   3 SAB 10/2018           2 Term 11 40w0d   F Vag-Spont None  KATH   1 Term 03/28/10    F Vag-Spont None  KATH       Review of Systems   Constitutional: Negative for chills, fatigue and fever  HENT: Negative for rhinorrhea, sinus pressure, sneezing and sore throat  Eyes: Negative for visual disturbance  Respiratory: Negative for cough, shortness of breath and wheezing  Cardiovascular: Negative for chest pain, palpitations and leg swelling  Gastrointestinal: Negative for abdominal distention, abdominal pain, blood in stool, nausea and vomiting  Genitourinary: Negative for dysuria, flank pain, vaginal bleeding and vaginal discharge  Musculoskeletal: Negative for neck pain and neck stiffness  Skin: Negative for color change, pallor and rash  Neurological: Negative for dizziness, seizures, syncope, light-headedness, numbness and headaches         Past Medical History:   Diagnosis Date   • Herpes     Latent   • Patient denies medical problems        Past Surgical History:   Procedure Laterality Date   • NO PAST SURGERIES         Social History     Socioeconomic History   • Marital status: /Civil Union     Spouse name: Not on file   • Number of children: Not on file   • Years of education: Not on file   • Highest education level: Not on file   Occupational History   • Not on file   Tobacco Use   • Smoking status: Never   • Smokeless tobacco: Never   Vaping Use   • Vaping Use: Never used   Substance and Sexual Activity   • Alcohol use: Not Currently     Comment: socially   • Drug use: No   • Sexual activity: Yes     Partners: Male     Birth control/protection: None   Other Topics Concern   • Not on file   Social History Narrative   • Not on file     Social Determinants of Health     Financial Resource Strain: Medium Risk (5/31/2023)    Overall Financial Resource Strain (CARDIA)    • Difficulty of Paying Living Expenses: Somewhat hard   Food Insecurity: Food Insecurity Present (5/31/2023)    Hunger Vital Sign    • Worried About Running Out of Food in the Last Year: Often true    • Ran Out of Food in the Last Year: Often true   Transportation Needs: Unmet Transportation Needs (5/31/2023)    PRAPARE - Transportation    • Lack of Transportation (Medical): Yes    • Lack of Transportation (Non-Medical): Yes   Physical Activity: Not on file   Stress: Not on file   Social Connections: Not on file   Intimate Partner Violence: Not At Risk (5/31/2023)    Humiliation, Afraid, Rape, and Kick questionnaire    • Fear of Current or Ex-Partner: No    • Emotionally Abused: No    • Physically Abused: No    • Sexually Abused: No   Housing Stability: Low Risk  (5/31/2023)    Housing Stability Vital Sign    • Unable to Pay for Housing in the Last Year: No    • Number of Places Lived in the Last Year: 1    • Unstable Housing in the Last Year: No       OBJECTIVE  Vitals:    06/15/23 1457   BP: 100/63   Pulse: 64   Resp: 18     Physical Exam  Constitutional:       General: She is not in acute distress  Appearance: She is well-developed and normal weight  She is not diaphoretic  HENT:      Head: Normocephalic and atraumatic  Eyes:      General: No scleral icterus  Conjunctiva/sclera: Conjunctivae normal    Neck:      Thyroid: No thyroid mass, thyromegaly or thyroid tenderness  Cardiovascular:      Rate and Rhythm: Normal rate and regular rhythm  Heart sounds: Normal heart sounds  No murmur heard  No friction rub  No gallop  Pulmonary:      Effort: Pulmonary effort is normal  No respiratory distress  Breath sounds: Normal breath sounds  No stridor  No wheezing or rales  Chest:      Chest wall: No tenderness  Abdominal:      General: There is no distension  Palpations: Abdomen is soft  There is no mass  Tenderness: There is no abdominal tenderness  There is no guarding or rebound  Hernia: No hernia is present  Musculoskeletal:         General: No deformity  Normal range of motion  Cervical back: Normal range of motion and neck supple  Right lower leg: No edema  Left lower leg: No edema     Lymphadenopathy:      Upper Body: "Right upper body: No supraclavicular adenopathy  Left upper body: No supraclavicular adenopathy  Neurological:      General: No focal deficit present  Mental Status: She is alert  Mental status is at baseline  Skin:     General: Skin is warm and dry  Findings: No erythema or lesion  Psychiatric:         Mood and Affect: Mood normal          Behavior: Behavior normal          ASSESSMENT AND PLAN    32 y o , [id number], with /63 (BP Location: Right arm, Patient Position: Sitting, Cuff Size: Adult)   Pulse 64   Resp 18   Ht 4' 9 5\" (1 461 m)   Wt 50 8 kg (112 lb)   LMP 2023 (Approximate) , at 12w1d here for her prenatal H&P  FHT 159bpm by SUZANNE    Pregnancy: H&P completed today  PN Labs reviewed today  Normal, apart from Hep B non immune  Labor expectations discussed with patient, including appointment schedule, nutrition, exercise, medications, sexual intercourse, and nausea/vomiting  Patient's BMI is 23  Recommended weight gain is 25-35lbs  Advised patient to call clinic for triage of obstetric complaints  Advised to present to [facility] for labor and delivery, for triage, or for clinical emergencies  Screening: Pap smear due 3/2024, last performed 3/2021 - NILM  GC/CT collected at ED visit 23   Center appointment 23  Consents: Delivery process including potential OVD and  reviewed  Sign delivery consent form at 28 weeks  Labor/Postpartum: For analgesia, patient plans on avoiding epidural  She plans to breastfeed    History of  delivery: Plan to follow up with [facility]  for monitoring  Discussed that may recommend vaginal progesterone for prevention of  delivery  Will continue to follow closely  Herpes: Valtrex prescribed for daily suppression due to history of herpes outbreaks with history of recent outbreak   Also discussed with patient being on suppressive therapy throughout pregnancy due to history of  " delivery  Patient amenable  Contraception: Different methods of contraception were discussed with patient, including progesterone only oral pills, depo provera, nexplanon, mirena, and paragard  Patient would like to use undecided during postpartum phase  Immunizations: Covid vaccine not obtained, influenza vaccine due in the fall, TDAP due at 28 weeks  Follow up: RTC in 4 weeks  Precautions regarding labor, leakage, bleeding, and fetal movement reviewed      Discussed with Dr Flor Mckeon MD  6/15/2023  3:04 PM

## 2023-06-15 ENCOUNTER — ROUTINE PRENATAL (OUTPATIENT)
Dept: OBGYN CLINIC | Facility: CLINIC | Age: 28
End: 2023-06-15

## 2023-06-15 ENCOUNTER — PATIENT OUTREACH (OUTPATIENT)
Dept: OBGYN CLINIC | Facility: CLINIC | Age: 28
End: 2023-06-15

## 2023-06-15 VITALS
DIASTOLIC BLOOD PRESSURE: 63 MMHG | RESPIRATION RATE: 18 BRPM | WEIGHT: 112 LBS | SYSTOLIC BLOOD PRESSURE: 100 MMHG | BODY MASS INDEX: 23.51 KG/M2 | HEART RATE: 64 BPM | HEIGHT: 58 IN

## 2023-06-15 DIAGNOSIS — Z78.9 NONIMMUNE TO HEPATITIS B VIRUS: ICD-10-CM

## 2023-06-15 DIAGNOSIS — A60.00 HERPES SIMPLEX INFECTION OF GENITOURINARY SYSTEM: ICD-10-CM

## 2023-06-15 DIAGNOSIS — O09.899 HISTORY OF PRETERM DELIVERY, CURRENTLY PREGNANT: ICD-10-CM

## 2023-06-15 DIAGNOSIS — Z34.91 FIRST TRIMESTER PREGNANCY: Primary | ICD-10-CM

## 2023-06-15 PROCEDURE — 99213 OFFICE O/P EST LOW 20 MIN: CPT | Performed by: OBSTETRICS & GYNECOLOGY

## 2023-06-15 RX ORDER — VALACYCLOVIR HYDROCHLORIDE 1 G/1
1000 TABLET, FILM COATED ORAL DAILY
Qty: 30 TABLET | Refills: 7 | Status: SHIPPED | OUTPATIENT
Start: 2023-06-15 | End: 2024-02-10

## 2023-06-15 NOTE — PROGRESS NOTES
BETTY Galicia met with 33 y/o-S-G5:P3:AB1-  Hungarian speaking woman for pre  assess  Pt resides with FOB and their 3 y/o daughter  Pt reported pregnancy was not intended but welcomed  Pt denies any usage of drug, alcohol or smoking  Pt denies MH or Dv hx  [facility]  Pt will call [address] for appointment  Pt is a  and denies financial concern at present time  Pt claimed FOB is her main support  Pt walks to the appointment  Pt is known to BETTY GALICIA form prior pregnancy    Pt denies any concern at this time

## 2023-06-15 NOTE — PATIENT INSTRUCTIONS
[employer]  [facility] for options    Postpartum Birth Control Options   Almost half of all pregnancies are not planned, which can sometimes be a happy surprise, but other times can be a stressful situation for a woman or family  Birth control can be an empowering tool for women to take control of their family planning so that they can have healthy pregnancies in the future if and when they want  We recommend at least 6 months, ideally 18 months, between delivery to conception of the next pregnancy  Implant  The birth control implant, brand name Nexplanon, is a small matchstick sized device that is placed under the skin of your upper arm  This releases a small amount of hormone daily that prevents pregnancy for up to 3 years of use  You can have the implant inserted after delivery prior to being discharged from the hospital or at any time in your OBGYN office depending on insurance coverage  Pros  - Less than 1 out of 100 women will get pregnant in 1 year using this method  - Once it is placed you do not have to do anything else to prevent pregnancy, like remembering to take a daily pill   - Once removed, the implant is immediately reversible and you will return to your normal ability to get pregnant  - While there is a theoretical risk of low milk supply when these methods are started right after birth, there is no research to support this theory  The implant is considered safe for use in breastfeeding mothers    Cons  - Most common side effects of the implant include changes in your period- which can be heavier, lighter, spotting in between periods or stopping your periods all together- it depends on the person  - Other side effects include headaches and acne    Intrauterine Device (IUD)  An intrauterine device (IUD) is a small T shaped device that is placed into the uterus   The IUD comes in 2 forms: hormonal and non-hormonal  - The non-hormonal IUD or copper IUD has been approved up to 10 years of use   - The well developed, well nourished , in no acute distress , ambulating without difficulty , normal communication ability hormonal IUD comes in forms that are approved for 3-8 years of use    This device can be placed immediately following delivery, 4 weeks after delivery or any time following that in your OBGYN office depending on insurance coverage  Pros  - Less than 1 out of 100 women will get pregnant in 1 year using this method  - Once it is placed you do not have to do anything else to not get pregnant, like remembering to take a daily pill  - Many women have lighter periods or no periods at all on the hormonal IUD  - Once removed, the IUD is immediately reversible and you will return to your normal ability to get pregnant  - While there is a theoretical risk of low milk supply when these methods are started right after birth, there is no research to support this theory  The IUD is considered safe for use in breastfeeding mothers    Cons  - Possibility of IUD falling out of the uterus   - Occurs in approximately 2-5% of women   - If your IUD is placed immediately after delivery the risk of it falling out is slightly higher at 10-27%  - Hormonal IUD can result in irregular spotting in the first 3-6 months that usually normalizes  - Non-hormonal IUD may cause heavier and more crampy periods in the first few months of use, which tends to improve after the first year of use  - Other side effects of the hormonal IUD include headaches and acne    Birth Control Shot  The birth control shot, brand name Depo Provera, is given every 3 months to prevent pregnancy  This injection can be received before being discharged from the hospital, following the delivery of your child or at any time at your OBGYN office       Pros  - About 4 out of 100 women will get pregnant in 1 year using this method  - You do not need to remember to take a daily pill but you do need to remember to get the injection at your OBGYN office every 3 months  - While there is a theoretical risk of low milk supply when this birth control are started right after birth, "there is no research to support this theory  The birth control shot is considered safe for use in breastfeeding mothers  Cons  - Most common side effect of the birth control injection is irregular bleeding; this usually normalizes after 1 year of use  - After your last shot, sometimes your ability to get pregnant can be delayed by as long as 1 year  - Weight gain, headaches, and mood changes are side effects that some women may experience  - Decreased bone mineral density can occur with long-term use; however, this is reversible once you stop using the injection and does not increase your lifetime risk of osteoporosis    Progesterone-Only Pills  The \"mini pill\" is a pill that must be taken once daily at the same time each day  This can be started as soon as you go home from the hospital after delivery, or at any time  Pros  - About 8 out of 100 women will get pregnant in 1 year using this method  - You have complete control of when to start and stop taking the pill  - Many women have lighter periods or no periods at all while taking this pill  - While there is a theoretical risk of low milk supply when this birth control are started right after birth, there is no research to support this theory  The progesterone only pill is considered safe for use in breastfeeding mothers    Cons  - You must take the pill at the same time every day  If you miss one day, you need another form of pregnancy prevention for at least 7 days  - Some people have irregular, unscheduled bleeding while taking the pill  - Other side effects include headaches and acne    Combined Hormonal Contraceptives (Pill, Patch, Ring)  These methods all contain 2 hormones, estrogen and progesterone  The pill is taken once daily, the patch is worn on the skin and changed once weekly, the ring is placed vaginally and changed once monthly  These methods can be started 4-6 weeks after giving birth      Pros  - About 8 out of 100 women will get pregnant in " 1 year using this method  - You have complete control of when to start and stop using these methods  - Many women have lighter, less painful, regular periods while taking this pill    Cons  - Recommend against use while breastfeeding as estrogen can decrease milk supply  - You must take the pill at the same time every day  If you miss one day, you need another form of pregnancy prevention for at least 7 days    Male/Female condoms, withdrawal, fertility awareness  These are methods that you either buy over the counter or do yourself  Condoms and withdrawal must be used with every sexual act  Fertility awareness must be assessed every day  Condoms and withdrawal can be used immediately after delivery once your doctor has performed your postpartum office exam  Fertility awareness can be started after return of your period      Pros  - You have complete control of when to start and stop using these methods  - No issue with use and breastfeeding    Cons  - About 15-25 out of 100 women will get pregnant in 1 year using this method  - Condoms and withdrawal must be used with every sexual act  - Fertility awareness must be assessed every day

## 2023-06-20 ENCOUNTER — ROUTINE PRENATAL (OUTPATIENT)
Facility: HOSPITAL | Age: 28
End: 2023-06-20
Payer: COMMERCIAL

## 2023-06-20 VITALS
SYSTOLIC BLOOD PRESSURE: 92 MMHG | BODY MASS INDEX: 24.25 KG/M2 | HEART RATE: 85 BPM | HEIGHT: 57 IN | WEIGHT: 112.4 LBS | DIASTOLIC BLOOD PRESSURE: 50 MMHG

## 2023-06-20 DIAGNOSIS — Z3A.12 12 WEEKS GESTATION OF PREGNANCY: ICD-10-CM

## 2023-06-20 DIAGNOSIS — O09.899 HISTORY OF PRETERM DELIVERY, CURRENTLY PREGNANT: Primary | ICD-10-CM

## 2023-06-20 DIAGNOSIS — Z36.82 ENCOUNTER FOR (NT) NUCHAL TRANSLUCENCY SCAN: ICD-10-CM

## 2023-06-20 DIAGNOSIS — Z3A.01 LESS THAN 8 WEEKS GESTATION OF PREGNANCY: ICD-10-CM

## 2023-06-20 PROCEDURE — 76813 OB US NUCHAL MEAS 1 GEST: CPT | Performed by: OBSTETRICS & GYNECOLOGY

## 2023-06-20 PROCEDURE — 99243 OFF/OP CNSLTJ NEW/EST LOW 30: CPT | Performed by: OBSTETRICS & GYNECOLOGY

## 2023-06-20 NOTE — PROGRESS NOTES
"[address]: Ms Selene López was seen today at [address] for nuchal translucency ultrasound  See ultrasound report under \"OB Procedures\" tab  My recommendations are as follows:  1  We reviewed the availability of genetic screening, as well as diagnostic testing, which are available to all pregnant women  We reviewed limitations, risks, and benefits of screening and testing  She does not wish to pursue aneuploidy screening or diagnostic testing at this time  MSAFP screening should be ordered through your office at 15-20 weeks gestation, and completed prior to fetal anatomic survey  A detailed anatomic survey as well as transvaginal cervical length screening are recommended between 18-22 weeks gestation  2  We reviewed her history of spontaneous  delivery, which increases her risk for  birth in this pregnancy  Serial cervical surveillance (by transvaginal sonography) is helpful in predicting risk of  delivery  Measurement of transvaginal cervical length is recommended between 16 0/7 and 23 6/7 weeks gestation  Cervical length less than 25mm before 24 weeks gestation would prompt recommendation for a transvaginal ultrasound-indicated cerclage as well as initiation of vaginal progesterone  In women with a history of spontaneous  delivery, approximately 1/3 will require cerclage placement, and 2/3 will have normal cervical length screening  Surveillance for signs and symptoms of  labor and rupture of membranes are recommended during the pregnancy  She is in agreement with serial cervical surveillance and vaginal progesterone ([facility]), and cerclage if indicated       Please don't hesitate to contact our office with any concerns or questions     -Georgiana Angelucci, MD  "

## 2023-06-20 NOTE — LETTER
"2023     Dinorah Hernandez MD  [address]  [address] [place] [address]    Patient: Tita Ramirez   YOB: 1995   Date of Visit: 2023       Dear Dr Windy Abreu:    Thank you for referring Tita Ramirez to me for evaluation  Below are my notes for this consultation  If you have questions, please do not hesitate to call me  I look forward to following your patient along with you  Sincerely,        Raymundo Solomon MD        CC: No Recipients    Raymundo Solomon MD  2023  4:19 PM  Sign when Signing Visit  [address] [facility] [address]: Ms Madina Alcantar was seen today at [address] for nuchal translucency ultrasound  See ultrasound report under \"OB Procedures\" tab  My recommendations are as follows:  1  We reviewed the availability of genetic screening, as well as diagnostic testing, which are available to all pregnant women  We reviewed limitations, risks, and benefits of screening and testing  She does not wish to pursue aneuploidy screening or diagnostic testing at this time  MSAFP screening should be ordered through your office at 15-20 weeks gestation, and completed prior to fetal anatomic survey  A detailed anatomic survey as well as transvaginal cervical length screening are recommended between 18-22 weeks gestation  2  We reviewed her history of spontaneous  delivery, which increases her risk for  birth in this pregnancy  Serial cervical surveillance (by transvaginal sonography) is helpful in predicting risk of  delivery  Measurement of transvaginal cervical length is recommended between 16 0/7 and 23 6/7 weeks gestation  Cervical length less than 25mm before 24 weeks gestation would prompt recommendation for a transvaginal ultrasound-indicated cerclage as well as initiation of vaginal progesterone   In women with a history of spontaneous  delivery, approximately 1/3 will require " cerclage placement, and 2/3 will have normal cervical length screening  Surveillance for signs and symptoms of  labor and rupture of membranes are recommended during the pregnancy  She is in agreement with serial cervical surveillance and vaginal progesterone (e-script sent to [address] [place] [address]), and cerclage if indicated       Please don't hesitate to contact our office with any concerns or questions     -Lindsey Reddy MD

## 2023-06-20 NOTE — PATIENT INSTRUCTIONS
Your vaginal progesterone prescription was sent to [facility], [address])  As a reminder you will have to self-pay for this medication, some insurance companies may reimburse if you submit receipts to them  Please contact the pharmacy directly to arrange pick-up

## 2023-07-11 PROBLEM — Z3A.15 15 WEEKS GESTATION OF PREGNANCY: Status: ACTIVE | Noted: 2023-06-20

## 2023-07-12 ENCOUNTER — ROUTINE PRENATAL (OUTPATIENT)
Dept: OBGYN CLINIC | Facility: CLINIC | Age: 28
End: 2023-07-12

## 2023-07-12 ENCOUNTER — APPOINTMENT (OUTPATIENT)
Dept: LAB | Facility: CLINIC | Age: 28
End: 2023-07-12
Payer: COMMERCIAL

## 2023-07-12 ENCOUNTER — ROUTINE PRENATAL (OUTPATIENT)
Facility: HOSPITAL | Age: 28
End: 2023-07-12
Payer: COMMERCIAL

## 2023-07-12 VITALS
DIASTOLIC BLOOD PRESSURE: 60 MMHG | BODY MASS INDEX: 24.2 KG/M2 | SYSTOLIC BLOOD PRESSURE: 93 MMHG | HEART RATE: 69 BPM | HEIGHT: 57 IN | WEIGHT: 112.2 LBS

## 2023-07-12 VITALS
HEART RATE: 96 BPM | BODY MASS INDEX: 24.12 KG/M2 | HEIGHT: 57 IN | SYSTOLIC BLOOD PRESSURE: 98 MMHG | WEIGHT: 111.8 LBS | DIASTOLIC BLOOD PRESSURE: 58 MMHG

## 2023-07-12 DIAGNOSIS — Z78.9 LANGUAGE BARRIER: ICD-10-CM

## 2023-07-12 DIAGNOSIS — O09.899 HISTORY OF PRETERM DELIVERY, CURRENTLY PREGNANT: Primary | ICD-10-CM

## 2023-07-12 DIAGNOSIS — Z3A.16 16 WEEKS GESTATION OF PREGNANCY: ICD-10-CM

## 2023-07-12 DIAGNOSIS — Z34.92 SECOND TRIMESTER PREGNANCY: ICD-10-CM

## 2023-07-12 PROCEDURE — 76817 TRANSVAGINAL US OBSTETRIC: CPT | Performed by: OBSTETRICS & GYNECOLOGY

## 2023-07-12 PROCEDURE — 99213 OFFICE O/P EST LOW 20 MIN: CPT | Performed by: NURSE PRACTITIONER

## 2023-07-12 PROCEDURE — 99213 OFFICE O/P EST LOW 20 MIN: CPT | Performed by: OBSTETRICS & GYNECOLOGY

## 2023-07-12 PROCEDURE — 36415 COLL VENOUS BLD VENIPUNCTURE: CPT

## 2023-07-12 PROCEDURE — 82105 ALPHA-FETOPROTEIN SERUM: CPT

## 2023-07-12 NOTE — PROGRESS NOTES
[address]  OB/GYN prenatal visit    S: 32 y.o. [id number] 15w6d here for PN visit. She has no obstetric complaints, including pelvic pain, contractions, vaginal bleeding, loss of fluid, she has no perceived  fetal movement. Has nausea not taking Unisom and vitamin B6 as prescribed. Reviewed when to take, recommend to call if does not help her.   Reports will use  progesterone suppositories has not picked up yet, has US today  O:  Vitals:    23 1031   BP: 93/60   Pulse: 69       Gen: no acute distress, nonlabored breathing  Fundal Height (cm): 15 cm  Fetal Heart Rate: 142    A/P:      IUP at 15w6d  No obstetric complaints today  TWG: - 7.3 oz  ( 25-35 lbs recommended weight gain in pregnancy  Cx surveillance 23  Declines NIPT  MSAFP desires to get  US today  Discussed  labor precautions and fetal kick counts    Return to clinic in 4 weeks    Problem List        Genitourinary    Herpes simplex infection of genitourinary system    Overview     Takes daily suppression at baseline            Other    Second trimester pregnancy    Overview     Prenatal Panel normal  Recommended weight gain 25-35lbs  Declines NIPT  Desires MSAFP         History of  delivery, currently pregnant    Overview     Spontaneous  breech delivery 2021 at 32 weeks, 2 prior term vaginal deliveries in [place] to vaginal progesterone and serial cervical surveillance, with cerclage if indicated         Nonimmune to hepatitis B virus    Overview     Noted on prenatal panel, can consider vaccine series through PCP         16 weeks gestation of pregnancy    Language barrier         SUSIE Schulte  2023  10:47 AM

## 2023-07-12 NOTE — PROGRESS NOTES
The patient was seen today for an ultrasound and NST. Please see ultrasound report (located under OB Procedures tab) for additional details.

## 2023-07-15 LAB
2ND TRIMESTER 4 SCREEN SERPL-IMP: NORMAL
AFP ADJ MOM SERPL: 1.22
AFP INTERP AMN-IMP: NORMAL
AFP INTERP SERPL-IMP: NORMAL
AFP INTERP SERPL-IMP: NORMAL
AFP SERPL-MCNC: 51.3 NG/ML
AGE AT DELIVERY: 28.2 YR
GA METHOD: NORMAL
GA: 16 WEEKS
IDDM PATIENT QL: NO
MULTIPLE PREGNANCY: NO
NEURAL TUBE DEFECT RISK FETUS: 6110 %

## 2023-07-19 ENCOUNTER — TELEPHONE (OUTPATIENT)
Dept: OBGYN CLINIC | Facility: CLINIC | Age: 28
End: 2023-07-19

## 2023-07-19 NOTE — TELEPHONE ENCOUNTER
ID [id number]    Called and spoke to patient, informed her of the test results. Patient verbalized understanding, no questions or concerns.

## 2023-07-19 NOTE — TELEPHONE ENCOUNTER
----- Message from Piotr Joseph, [address] [place] [address] sent at 7/19/2023  9:48 AM EDT -----  Please inform Fany that her screening test for neural tube defects/spina bifida was negative.   Thank you

## 2023-07-27 ENCOUNTER — ROUTINE PRENATAL (OUTPATIENT)
Facility: HOSPITAL | Age: 28
End: 2023-07-27
Payer: COMMERCIAL

## 2023-07-27 VITALS
HEIGHT: 57 IN | HEART RATE: 68 BPM | SYSTOLIC BLOOD PRESSURE: 92 MMHG | WEIGHT: 114.8 LBS | DIASTOLIC BLOOD PRESSURE: 50 MMHG | BODY MASS INDEX: 24.77 KG/M2

## 2023-07-27 DIAGNOSIS — O09.899 HISTORY OF PRETERM DELIVERY, CURRENTLY PREGNANT: Primary | ICD-10-CM

## 2023-07-27 DIAGNOSIS — Z3A.18 18 WEEKS GESTATION OF PREGNANCY: ICD-10-CM

## 2023-07-27 PROCEDURE — 76817 TRANSVAGINAL US OBSTETRIC: CPT | Performed by: OBSTETRICS & GYNECOLOGY

## 2023-07-27 NOTE — LETTER
2023     Kanika Sultana, [address] [address]    Patient: Gary Johnson   YOB: 1995   Date of Visit: 2023       Dear Dr. Kevin Espinosa: Thank you for referring Gary Johnson to me for evaluation. Below are my notes for this consultation. If you have questions, please do not hesitate to call me. I look forward to following your patient along with you. Sincerely,        Dav Vaughn MD        CC: No Recipients    Dav Vaughn MD  2023  4:20 PM  Sign when Signing Visit  Gary Johnson  is 18w1d. She previously declined genetic screening. She had a normal MSAFP. She is here today for a cervical length because of a history of a prior baby born at 26 weeks due to  labor. She is not taking vaginal progesterone. Ultrasound findings: The ultrasound today shows a normal cervical length. Pregnancy ultrasound has limitations and is unable to detect all forms of fetal congenital abnormalities. Follow up recommended:   1.  She is set up for a 20-week ultrasound for fetal anatomy and cervical length in 2 weeks    Dav Vaughn MD

## 2023-07-27 NOTE — PROGRESS NOTES
Fany Loredo  is 18w1d. She previously declined genetic screening. She had a normal MSAFP. She is here today for a cervical length because of a history of a prior baby born at 26 weeks due to  labor. She is not taking vaginal progesterone. Ultrasound findings: The ultrasound today shows a normal cervical length. Pregnancy ultrasound has limitations and is unable to detect all forms of fetal congenital abnormalities. Follow up recommended:   1.  She is set up for a 20-week ultrasound for fetal anatomy and cervical length in 2 weeks    Nataliia Ojeda MD

## 2023-07-27 NOTE — PROGRESS NOTES
Ultrasound Probe Disinfection    A transvaginal ultrasound was performed. Prior to use, disinfection was performed with High Level Disinfection Process (BYOM!on). Probe serial number A1: [id number] was used.       Alyssa Keith  07/27/23  3:56 PM

## 2023-08-08 PROBLEM — Z3A.20 20 WEEKS GESTATION OF PREGNANCY: Status: ACTIVE | Noted: 2023-06-20

## 2023-08-14 ENCOUNTER — ROUTINE PRENATAL (OUTPATIENT)
Dept: PERINATAL CARE | Facility: CLINIC | Age: 28
End: 2023-08-14
Payer: COMMERCIAL

## 2023-08-14 ENCOUNTER — ROUTINE PRENATAL (OUTPATIENT)
Dept: OBGYN CLINIC | Facility: CLINIC | Age: 28
End: 2023-08-14

## 2023-08-14 VITALS
BODY MASS INDEX: 24.81 KG/M2 | SYSTOLIC BLOOD PRESSURE: 95 MMHG | HEIGHT: 57 IN | HEART RATE: 58 BPM | DIASTOLIC BLOOD PRESSURE: 59 MMHG | WEIGHT: 115 LBS | RESPIRATION RATE: 18 BRPM

## 2023-08-14 VITALS
BODY MASS INDEX: 25.07 KG/M2 | SYSTOLIC BLOOD PRESSURE: 100 MMHG | HEART RATE: 82 BPM | HEIGHT: 57 IN | WEIGHT: 116.2 LBS | DIASTOLIC BLOOD PRESSURE: 56 MMHG

## 2023-08-14 DIAGNOSIS — Z3A.20 20 WEEKS GESTATION OF PREGNANCY: ICD-10-CM

## 2023-08-14 DIAGNOSIS — Z03.75 ENCOUNTER FOR SUSPECTED CERVICAL SHORTENING RULED OUT: ICD-10-CM

## 2023-08-14 DIAGNOSIS — Z36.3 ENCOUNTER FOR ANTENATAL SCREENING FOR MALFORMATIONS: Primary | ICD-10-CM

## 2023-08-14 DIAGNOSIS — Z34.92 SECOND TRIMESTER PREGNANCY: ICD-10-CM

## 2023-08-14 DIAGNOSIS — O09.899 HISTORY OF PRETERM DELIVERY, CURRENTLY PREGNANT: ICD-10-CM

## 2023-08-14 DIAGNOSIS — Z78.9 LANGUAGE BARRIER: ICD-10-CM

## 2023-08-14 DIAGNOSIS — O09.899 HISTORY OF PRETERM DELIVERY, CURRENTLY PREGNANT: Primary | ICD-10-CM

## 2023-08-14 PROCEDURE — 99213 OFFICE O/P EST LOW 20 MIN: CPT | Performed by: OBSTETRICS & GYNECOLOGY

## 2023-08-14 PROCEDURE — 76805 OB US >/= 14 WKS SNGL FETUS: CPT | Performed by: OBSTETRICS & GYNECOLOGY

## 2023-08-14 PROCEDURE — 76817 TRANSVAGINAL US OBSTETRIC: CPT | Performed by: OBSTETRICS & GYNECOLOGY

## 2023-08-14 PROCEDURE — 99213 OFFICE O/P EST LOW 20 MIN: CPT | Performed by: NURSE PRACTITIONER

## 2023-08-14 NOTE — PATIENT INSTRUCTIONS
Thank you for choosing us for your  care today. If you have any questions about your ultrasound or care, please do not hesitate to contact us or your primary obstetrician. Some general instructions for your pregnancy are:    Exercise: Aim for 22 minutes per day (150 minutes per week) of regular exercise. Walking is great! Nutrition: Choose healthy sources of calcium, iron, and protein. Learn about Preeclampsia: preeclampsia is a common, serious high blood pressure complication in pregnancy. A blood pressure of 934GHXZ (systolic or top number) or 64CGUN (diastolic or bottom number) is not normal and needs evaluation by your doctor. Aspirin is sometimes prescribed in early pregnancy to prevent preeclampsia in women with risk factors - ask your obstetrician if you should be on this medication. For more resources, visit:  MapCoverage.fi  If you smoke, try to reduce how many cigarettes you smoke or try to quit completely. Do not vape. Other warning signs to watch out for in pregnancy or postpartum: chest pain, obstructed breathing or shortness of breath, seizures, thoughts of hurting yourself or your baby, bleeding, a painful or swollen leg, fever, or headache (see AWHONN POST-BIRTH Warning Signs campaign). If these happen call 911. Itching is also not normal in pregnancy and if you experience this, especially over your hands and feet, potentially worse at night, notify your doctors.

## 2023-08-14 NOTE — PROGRESS NOTES
Ultrasound Probe Disinfection    A transvaginal ultrasound was performed. Prior to use, disinfection was performed with High Level Disinfection Process ([employer]on). Probe serial number B2: [id number] was used.       Huang Mills  08/14/23  10:33 AM

## 2023-08-14 NOTE — PROGRESS NOTES
Patient chose to have [facility] Non-invasive Prenatal Screen with fetal sex. Patient given brochure and is aware [facility] will contact patients insurance and coordinate coverage. Patient made aware she will need to respond to text message or e-mail from [address] within 2 business days or testing will be run through insurance. Patient informed text message will come from area code  "415"  Provided [address] Marie # [phone] and web site : [email or web address]. Blood collection tubes labeled with patient identifiers (name, date of birth)    printed [facility] lab order and test kit given with FED EX packaging (Tracking # [id number])  given to patient to take to [facility] outpatient lab for blood collection. Requested patient notify MFM ( via phone call or Murlene Clock message)  when blood collected so office can f/u results    Copy of lab order scanned to Epic media. Maternal Fetal Medicine will have results in approximately 7-10 business days and will call patient or notify via [address] [place] [address]. Patient aware viewing lab result online will reveal fetal sex If ordered. Patient verbalized understanding of all instructions and no questions at this time.

## 2023-08-14 NOTE — PROGRESS NOTES
[id number]  [facility]: Ms. Saray Muller was seen today for anatomic survey and cervical length screening ultrasound. See ultrasound report under "OB Procedures" tab. The time spent on this established patient on the encounter date included 5 minutes previsit service time reviewing records and precharting, 10 minutes face-to-face service time counseling regarding results and coordinating care, and  5 minutes charting, totalling 20 minutes. Please don't hesitate to contact our office with any concerns or questions.   Maribel Martin MD

## 2023-08-14 NOTE — PROGRESS NOTES
ID [id number]
[address]  OB/GYN prenatal visit    S: 32 y.o. [id number] 20w5d here for PN visit.  used [id number]. She has no obstetric complaints, including pelvic pain, contractions, vaginal bleeding, loss of fluid, or decreased fetal movement. Reports has very small amount white dsch on;y in the morning, denies any itching, burning or odor. She has not picked up her vaginal progesterone yet, address given.    O:  Vitals:    23 0824   BP: 95/59   Pulse: 58   Resp: 18       Gen: no acute distress, nonlabored breathing  Fundal Height (cm): 20 cm  Fetal Heart Rate: 148    A/P:      IUP at 20w5d  No obstetric complaints today  TWG: + 2 lbs 5.5.oz  ( 25-35 lbs recommended weight gain in pregnancy)  US today at [facility]  Discussed  labor precautions and fetal kick counts    Return to clinic in 4 weeks    Problem List        Genitourinary    Herpes simplex infection of genitourinary system    Overview     Takes daily suppression at baseline, takes 1000 mgs daily              Other    Second trimester pregnancy    Overview     Prenatal Panel normal  Recommended weight gain 25-35lbs  Declines NIPT  Desires MSAFP- negative  Hx HSV - on Valtrex 1000 mgs daily  US 23         History of  delivery, currently pregnant    Overview     Spontaneous  breech delivery 2021 at 32 weeks, 2 prior term vaginal deliveries in [place] to vaginal progesterone and serial cervical surveillance, with cerclage if indicated         Nonimmune to hepatitis B virus    Overview     Noted on prenatal panel, can consider vaccine series through PCP         20 weeks gestation of pregnancy    Language barrier       SUSIE Wetzel  2023  8:38 AM
PAST SURGICAL HISTORY:  H/O:      History of cholecystectomy laparoscopic cholecystectomy

## 2023-08-19 ENCOUNTER — APPOINTMENT (OUTPATIENT)
Dept: LAB | Facility: HOSPITAL | Age: 28
End: 2023-08-19
Payer: COMMERCIAL

## 2023-08-19 DIAGNOSIS — Z36.9 UNSPECIFIED ANTENATAL SCREENING: ICD-10-CM

## 2023-08-19 DIAGNOSIS — Z33.1 PREGNANT STATE, INCIDENTAL: ICD-10-CM

## 2023-08-19 PROCEDURE — 36415 COLL VENOUS BLD VENIPUNCTURE: CPT

## 2023-08-25 ENCOUNTER — TELEPHONE (OUTPATIENT)
Facility: HOSPITAL | Age: 28
End: 2023-08-25

## 2023-08-25 NOTE — TELEPHONE ENCOUNTER
Spoke with pt using Gabonese Interpretor # [id number] to provide results of NIPS. PT receptive to information and declines questions at this time.

## 2023-08-25 NOTE — TELEPHONE ENCOUNTER
----- Message from Marylene No, MD sent at 2023  1:14 PM EDT -----  Hi  staff,  I've reviewed this NIPS which is low risk. Can someone reach out to Fany and let her know the results as she does not have MyChart?

## 2023-08-28 ENCOUNTER — ROUTINE PRENATAL (OUTPATIENT)
Facility: HOSPITAL | Age: 28
End: 2023-08-28

## 2023-08-28 VITALS
HEART RATE: 73 BPM | DIASTOLIC BLOOD PRESSURE: 52 MMHG | BODY MASS INDEX: 25.36 KG/M2 | SYSTOLIC BLOOD PRESSURE: 96 MMHG | WEIGHT: 117.2 LBS

## 2023-08-28 DIAGNOSIS — Z3A.22 22 WEEKS GESTATION OF PREGNANCY: Primary | ICD-10-CM

## 2023-08-28 DIAGNOSIS — O09.892 HISTORY OF PREMATURE DELIVERY, CURRENTLY PREGNANT, SECOND TRIMESTER: ICD-10-CM

## 2023-08-28 PROCEDURE — 76817 TRANSVAGINAL US OBSTETRIC: CPT | Performed by: OBSTETRICS & GYNECOLOGY

## 2023-08-28 PROCEDURE — 76815 OB US LIMITED FETUS(S): CPT | Performed by: OBSTETRICS & GYNECOLOGY

## 2023-08-28 NOTE — LETTER
August 28, 2023     [address] [facility] PROVIDER    Patient: Shayne Seth Ajtun   YOB: 1995   Date of Visit: 8/28/2023       Dear  Provider: Thank you for referring Stefani Sibley to me for evaluation. Below are my notes for this consultation. If you have questions, please do not hesitate to call me. I look forward to following your patient along with you.          Sincerely,        Zhang Flores MD        CC: No Recipients    Zhang Flores MD  8/26/2023  4:17 PM  Sign when Signing Visit  Please refer to the [facility] ultrasound report in Ob Procedures for additional information regarding today's visit

## 2023-09-12 ENCOUNTER — ROUTINE PRENATAL (OUTPATIENT)
Dept: OBGYN CLINIC | Facility: CLINIC | Age: 28
End: 2023-09-12

## 2023-09-12 VITALS
WEIGHT: 124.8 LBS | SYSTOLIC BLOOD PRESSURE: 100 MMHG | HEIGHT: 57 IN | DIASTOLIC BLOOD PRESSURE: 65 MMHG | BODY MASS INDEX: 26.93 KG/M2 | RESPIRATION RATE: 18 BRPM | HEART RATE: 61 BPM

## 2023-09-12 DIAGNOSIS — Z3A.24 24 WEEKS GESTATION OF PREGNANCY: ICD-10-CM

## 2023-09-12 DIAGNOSIS — O09.899 HISTORY OF PRETERM DELIVERY, CURRENTLY PREGNANT: ICD-10-CM

## 2023-09-12 DIAGNOSIS — Z34.92 SECOND TRIMESTER PREGNANCY: Primary | ICD-10-CM

## 2023-09-12 DIAGNOSIS — Z78.9 LANGUAGE BARRIER: ICD-10-CM

## 2023-09-12 PROCEDURE — 99213 OFFICE O/P EST LOW 20 MIN: CPT | Performed by: OBSTETRICS & GYNECOLOGY

## 2023-09-12 NOTE — PROGRESS NOTES
[address]  OB/GYN prenatal visit    S: 32 y.o. [id number] 24w6d here for PN visit.  used she has obstetric complaints, including pelvic pain, contractions, vaginal bleeding, loss of fluid, or decreased fetal movement.      O:  Vitals:    23 1501   BP: 100/65   Pulse: 61   Resp: 18       Gen: no acute distress, nonlabored breathing  Nontender, gravid abdomen  Fundal Height (cm): 24 cm  Fetal Heart Rate: 146    A/P:      IUP at 24w6d  No obstetric complaints today  TWG: +2.06 kg   23, vertex growth scan at 32 weeks  To complete 28-week labs prior to next appointment, O+  Contraception: nexplanon  Breastfeeding: both breastfeeding with supplemental formula if needed  Birth plan: vaginal delivery  Discussed  labor precautions and fetal kick counts    Return to clinic in 4 weeks    Noemy Holliday DO  2023  4:15 PM

## 2023-10-10 ENCOUNTER — ROUTINE PRENATAL (OUTPATIENT)
Dept: OBGYN CLINIC | Facility: CLINIC | Age: 28
End: 2023-10-10

## 2023-10-10 VITALS — WEIGHT: 128 LBS | BODY MASS INDEX: 27.61 KG/M2 | HEIGHT: 57 IN | RESPIRATION RATE: 18 BRPM

## 2023-10-10 DIAGNOSIS — Z3A.28 28 WEEKS GESTATION OF PREGNANCY: Primary | ICD-10-CM

## 2023-10-10 DIAGNOSIS — Z23 NEED FOR VACCINATION: ICD-10-CM

## 2023-10-10 PROCEDURE — 99213 OFFICE O/P EST LOW 20 MIN: CPT | Performed by: OBSTETRICS & GYNECOLOGY

## 2023-10-10 PROCEDURE — 90715 TDAP VACCINE 7 YRS/> IM: CPT | Performed by: OBSTETRICS & GYNECOLOGY

## 2023-10-10 PROCEDURE — 90686 IIV4 VACC NO PRSV 0.5 ML IM: CPT | Performed by: OBSTETRICS & GYNECOLOGY

## 2023-10-10 PROCEDURE — 90471 IMMUNIZATION ADMIN: CPT | Performed by: OBSTETRICS & GYNECOLOGY

## 2023-10-10 PROCEDURE — 90472 IMMUNIZATION ADMIN EACH ADD: CPT | Performed by: OBSTETRICS & GYNECOLOGY

## 2023-10-10 NOTE — PROGRESS NOTES
Fany Burrows presents today for routine OB visit at 28w6d. Wt=58.1 kg (128 lb); Body mass index is 27.7 kg/m².; TWG=5.509 kg (12 lb 2.3 oz)   ; Abdomen: gravid, soft, non-tender. She reports nausea, vomiting. Denies uterine contractions. Denies vaginal bleeding or leaking of fluid  Reports adequate fetal movement of at least 10 movements in 2 hours once daily. Scheduled for ultrasound today. Reviewed premature labor precautions and fetal kick counts. Advised to continue medications and return in 2 weeks.       Current Outpatient Medications   Medication Instructions   • doxylamine (UNISOM) 12.5 mg, Oral, Daily at bedtime PRN   • Prenatal Vit-Fe Fumarate-FA (PRENATAL PO) Oral   • progesterone 200 mg, Vaginal, Daily at bedtime, Begin at 16 weeks, stop at 36 weeks gestation   • pyridoxine (B-6) 25 mg, Oral, Daily   • valACYclovir (VALTREX) 1,000 mg, Oral, Daily         Pregnancy Problems (from 05/11/23 to present)     Problem Noted Resolved    24 weeks gestation of pregnancy 6/20/2023 by Gerber Schmidt MD No    Second trimester pregnancy 4/7/2021 by SUSIE Colvin No    Overview Addendum 8/14/2023  8:31 AM by SUSIE Colvin     Prenatal Panel normal  Recommended weight gain 25-35lbs  Declines NIPT  Desires MSAFP- negative  Hx HSV - on Valtrex 1000 mgs daily  US 8/14/23

## 2023-10-10 NOTE — PROGRESS NOTES
28 week education packet provided to patient on 10/10/23. Included in packet:   Third Trimester paperwork  Delivery consent   Birthing room support person rules and acknowledgment  Birth Plan   Welcome information  Birth certificate worksheet   Consent for Photographers  Perineal/ Vaginal massage   Pediatric practices and locations   TDAP

## 2023-10-21 ENCOUNTER — APPOINTMENT (OUTPATIENT)
Dept: LAB | Facility: HOSPITAL | Age: 28
End: 2023-10-21
Attending: OBSTETRICS & GYNECOLOGY

## 2023-10-21 DIAGNOSIS — Z34.92 SECOND TRIMESTER PREGNANCY: ICD-10-CM

## 2023-10-21 DIAGNOSIS — O20.0 THREATENED ABORTION: ICD-10-CM

## 2023-10-21 LAB
B-HCG SERPL-ACNC: 3898 MIU/ML (ref 0–5)
BASOPHILS # BLD AUTO: 0.06 THOUSANDS/ÂΜL (ref 0–0.1)
BASOPHILS NFR BLD AUTO: 1 % (ref 0–1)
EOSINOPHIL # BLD AUTO: 0.1 THOUSAND/ÂΜL (ref 0–0.61)
EOSINOPHIL NFR BLD AUTO: 1 % (ref 0–6)
ERYTHROCYTE [DISTWIDTH] IN BLOOD BY AUTOMATED COUNT: 13.5 % (ref 11.6–15.1)
HCT VFR BLD AUTO: 29 % (ref 34.8–46.1)
HGB BLD-MCNC: 9.4 G/DL (ref 11.5–15.4)
IMM GRANULOCYTES # BLD AUTO: 0.14 THOUSAND/UL (ref 0–0.2)
IMM GRANULOCYTES NFR BLD AUTO: 2 % (ref 0–2)
LYMPHOCYTES # BLD AUTO: 2.62 THOUSANDS/ÂΜL (ref 0.6–4.47)
LYMPHOCYTES NFR BLD AUTO: 36 % (ref 14–44)
MCH RBC QN AUTO: 25.5 PG (ref 26.8–34.3)
MCHC RBC AUTO-ENTMCNC: 32.4 G/DL (ref 31.4–37.4)
MCV RBC AUTO: 79 FL (ref 82–98)
MONOCYTES # BLD AUTO: 0.59 THOUSAND/ÂΜL (ref 0.17–1.22)
MONOCYTES NFR BLD AUTO: 8 % (ref 4–12)
NEUTROPHILS # BLD AUTO: 3.68 THOUSANDS/ÂΜL (ref 1.85–7.62)
NEUTS SEG NFR BLD AUTO: 52 % (ref 43–75)
NRBC BLD AUTO-RTO: 0 /100 WBCS
PLATELET # BLD AUTO: 310 THOUSANDS/UL (ref 149–390)
PMV BLD AUTO: 9.2 FL (ref 8.9–12.7)
RBC # BLD AUTO: 3.68 MILLION/UL (ref 3.81–5.12)
WBC # BLD AUTO: 7.19 THOUSAND/UL (ref 4.31–10.16)

## 2023-10-21 PROCEDURE — 84702 CHORIONIC GONADOTROPIN TEST: CPT

## 2023-10-21 PROCEDURE — 86780 TREPONEMA PALLIDUM: CPT

## 2023-10-21 PROCEDURE — 36415 COLL VENOUS BLD VENIPUNCTURE: CPT

## 2023-10-22 LAB — TREPONEMA PALLIDUM IGG+IGM AB [PRESENCE] IN SERUM OR PLASMA BY IMMUNOASSAY: NORMAL

## 2023-10-24 DIAGNOSIS — O99.013 ANEMIA AFFECTING PREGNANCY IN THIRD TRIMESTER: Primary | ICD-10-CM

## 2023-10-24 PROBLEM — Z34.93 THIRD TRIMESTER PREGNANCY: Status: ACTIVE | Noted: 2021-04-07

## 2023-10-24 RX ORDER — FERROUS SULFATE 324(65)MG
324 TABLET, DELAYED RELEASE (ENTERIC COATED) ORAL
Qty: 90 TABLET | Refills: 3 | Status: SHIPPED | OUTPATIENT
Start: 2023-10-24

## 2023-10-27 ENCOUNTER — ROUTINE PRENATAL (OUTPATIENT)
Dept: OBGYN CLINIC | Facility: CLINIC | Age: 28
End: 2023-10-27

## 2023-10-27 VITALS
HEART RATE: 58 BPM | RESPIRATION RATE: 18 BRPM | DIASTOLIC BLOOD PRESSURE: 75 MMHG | WEIGHT: 132 LBS | BODY MASS INDEX: 28.48 KG/M2 | HEIGHT: 57 IN | SYSTOLIC BLOOD PRESSURE: 117 MMHG

## 2023-10-27 DIAGNOSIS — Z3A.31 31 WEEKS GESTATION OF PREGNANCY: ICD-10-CM

## 2023-10-27 DIAGNOSIS — Z34.93 PRENATAL CARE IN THIRD TRIMESTER: Primary | ICD-10-CM

## 2023-10-27 DIAGNOSIS — B37.31 CANDIDA VAGINITIS: ICD-10-CM

## 2023-10-27 PROCEDURE — 99213 OFFICE O/P EST LOW 20 MIN: CPT | Performed by: OBSTETRICS & GYNECOLOGY

## 2023-10-27 RX ORDER — METRONIDAZOLE 7.5 MG/G
GEL TOPICAL
Qty: 45 G | Refills: 0 | Status: SHIPPED | OUTPATIENT
Start: 2023-10-27 | End: 2023-12-05

## 2023-10-27 NOTE — PATIENT INSTRUCTIONS
El embarazo de la semana 31 a la 34   LO QUE NECESITA SABER:   Es posible que usted continúe teniendo síntomas tales aristeo falta de [place], [place], contracciones o inflamación en cyrus tobillos y pies. Usted podría estar aumentando 1 nicolas por semana ahora. INSTRUCCIONES SOBRE EL [facility]PH HOSPITALARIA:   Busque atención médica de inmediato si:  Usted presenta un michael dolor de jigna que no desaparece. Usted tiene [employer] visión nuevos o en aumento, aristeo visión borrosa o con manchas. Usted tiene inflamación nueva o creciente en marshall omer o andrés. Usted tiene manchado o sangrado vaginal.    Usted rompe lovely o siente un chorro o gotas de agua tibia que le está bajando por marshall vagina. Llame a marshall obstetra si:  Usted tiene más de 5 contracciones en 1 hora. Usted nota algún [employer] movimientos de marshall bebé. Usted tiene calambres, presión o tensión abdominal.    Usted tiene un cambio en la secreción vaginal.    Usted tiene escalofríos o fiebre. Usted tiene comezón, ardor o dolor vaginal.    Usted tiene arnold secreción vaginal amarillenta, verdosa, sylvester o de [employer]. Usted tiene dolor o ardor al [place], [place] menos de lo habitual o tiene orina rosada o sanguinolenta. Usted tiene preguntas o inquietudes acerca de marshall condición o cuidado. Cómo cuidarse en esta etapa de marshall embarazo:      Consuma alimentos saludables y variados. Alimentos saludables incluyen frutas, verduras, panes de wanda integral, alimentos lácteos bajos en grasa, frijoles, donnie magras y pescado. [place] líquidos aristeo se le haya indicado. Pregunte cuánto líquido debe teri cada día y cuáles líquidos son los más adecuados para usted. Limite el consumo de cafeína a menos de [address]. Limite el consumo de pescado a 2 porciones cada semana. Escoja pescado con concentraciones bajas de ryne aristeo atún al natural enlatado, camarón, salmón, bacalao o tilapia.  No coma pescado con concentraciones altas de ryne aristeo pez alyson, caballa gigante, pargo rayado y tiburón. Controle la acidez comiendo 4 o 5 comidas pequeñas cada día en vez de comidas grandes. Evite los alimentos picantes. Controle la inflamación al [employer] y poner los pies en alto o elevados sobre [place]. [address]. Hurt necesidad de ciertas vitaminas y [address], aristeo el ácido fólico, aumenta delores el [place]. Las vitaminas prenatales proporcionan algunas de las vitaminas y minerales adicionales que usted necesita. Las vitaminas prenatales también podrían ayudar a disminuir el riesgo de ciertos defectos de nacimiento. Consulte con hurt médico acerca de hacer ejercicio. El ejercicio moderado puede ayudarla a mantenerse en forma. Hurt médico la ayudará a planear un programa de ejercicios que sea seguro para usted delores hurt Rima. No fume. Fumar aumenta el riesgo de aborto espontáneo y otros problemas de izabela delores hurt [place]. Fumar puede causar que hurt bebé nazca antes de tiempo o que pese menos al nacer. Solicite información a hurt médico si usted necesita ayuda para dejar de fumar. No consuma alcohol. El alcohol pasa de hurt cuerpo al bebé a través de la placenta. Puede afectar el desarrollo del cerebro de hurt bebé y provocar el síndrome de alcoholismo fetal (SAF). El SAF es un nils de condiciones que causan problemas Maurice Edward, de comportamiento y de crecimiento. Consulte con hurt médico antes de teri cualquier medicamento. Muchos medicamentos pueden perjudicar a hurt bebé si usted los tomas [address]. No tome ningún medicamento, vitaminas, hierbas o suplementos sin kina consultar con hurt [place]   use drogas ilegales o de la sandhu (aristeo marihuana o cocaína) mientras está embarazada. Consejos de seguridad delores el embarazo:  Evite jacuzzis y saunas.  No use un jacuzzi o un sauna mientras usted está embarazada, especialmente delores el primer trimestre. Los [employer] y los saunas aumentan la temperatura de marshall bebé y el riesgo de defectos de nacimiento. Evite la toxoplasmosis. [place] es arnold infección causada por comer carne cruda o estar cerca del excremento de un soraya infectado. [place] puede causar malformaciones congénitas, aborto espontáneo y Sanjeev Schein. Lávese las andrés después de tocar carne cruda. Asegúrese de que la carne esté gema cocida antes de comerla. Evite los huevos crudos y la [place]. Use guantes o pida que alguien la ayude a limpiar la caja de arena del soraya mientras usted Arlice Deacon. Cambios que ocurren con marshall bebé: Para las 34 semanas, marshall bebé podría pesar más de 5 [facility]. Marshall bebé medirá alrededor de 12 ½ pulgadas de jackson desde el jessie de la jigna hasta la rabadilla (parte inferior del bebé). Marshall bebé está aumentando alrededor de ½ nicolas por semana. Ahora marshall bebé puede abrir y cerrar cyrus ojos. Las patadas y movimientos de marshall bebé son más travis en hemalatha momento. Lo que necesita saber acerca del cuidado prenatal: Marshall médico le revisará marshall presión arterial y [place]. Es posible que también necesite lo siguiente:  Un examen de orina también podría realizarse para revisarle el azúcar y la proteína. Estas son señales de diabetes gestacional o de infección. La proteína en marshall orina también podría ser arnold señal de preeclampsia. La preeclampsia es arnold condición que puede desarrollarse delores la semana 21 o después en marshall embarazo. Esta provoca presión arterial alan y [employer] con cyrus riñones y [place]. La detección de diabetes gestacional podría realizarse. Marshall médico le puede ordenar la curva de tolerancia a la glucosa (OGTT) de 1 paso o de 2 pasos. Examen de tolerancia a la glucosa en 1 paso: A usted le revisarán el nivel de azúcar en la moses después de que no haya comido por 8 horas (en ayunas). Luego le darán a teri arnold solución de glucosa.  Le examinarán el nivel de glucosa nuevamente 1 hora y 2 horas después de terminar la bebida. Examen de tolerancia a la glucosa en 2 paso: Usted no tiene que ayunar para la primera parte del examen. Usted se tomará la bebida de glucosa a cualquier hora del día. A usted le revisarán el nivel de azúcar en la moses al cabo de 1 hora. En flakita que el nivel de azúcar esté más alto que cierto nivel, le ordenarán otro examen. Usted tendrá que ayunar para que le revisen el azúcar en marshall moses. Usted se tomará la bebida de glucosa. Le examinarán la moses de nuevo 1 [place], 2 horas y 3 horas después de terminarse la bebida de glucosa. La vacuna Tdap podría ser recomendado por marshall médico.    La altura uterina es arnold medición del útero para controlar el desarrollo de marshall bebé. [employer] por lo general es igual al número de [address] que usted tiene de embarazo. Marshall médico también podría revisar la posición de marshall bebé. El ritmo cardíaco de marshall bebé será revisado. Programe arnold tiarra con marshall obstétrico aristeo se le indique: Anote cyrus preguntas para que se acuerde de hacerlas delores cyrus visitas. © Copyright Blanca Mar 2023 Information is for End User's use only and may not be sold, redistributed or otherwise used for commercial purposes. Esta información es sólo para uso en educación. Marshall intención no es darle un consejo médico sobre enfermedades o tratamientos. Colsulte con marshall Kasia Roes farmacéutico antes de seguir cualquier régimen médico para saber si es seguro y efectivo para usted.

## 2023-10-27 NOTE — PROGRESS NOTES
Fany Richards presents today for routine OB visit at 31w2d. Blood Pressure: 117/75  Wt=59.9 kg (132 lb); Body mass index is 28.56 kg/m².; TWG=8.775 kg (19 lb 5.5 oz)  Fetal Heart Rate: 130; Fundal Height (cm): 31 cm  Abdomen: gravid, soft, non-tender. She reports vaginal discharge with cottage cheese consistency, itching and burning x 2 weeks. Also complains of back pain and increased pressure/urgency to urinate. Denies uterine contractions. Denies vaginal bleeding or leaking of fluid. Reports adequate fetal movement of at least 10 movements in 2 hours once daily. Scheduled for ultrasound 11/7/23. Reviewed premature labor precautions and fetal kick counts. Advised to continue medications and return in 2 weeks. Current Outpatient Medications   Medication Instructions    acetaminophen (TYLENOL) 650 mg, Oral, Every 4 hours PRN    benzocaine-menthol-lanolin-aloe (DERMOPLAST) 20-0.5 % topical spray 1 Application, Topical, Every 6 hours PRN    docusate sodium (COLACE) 100 mg, Oral, 2 times daily    doxylamine (UNISOM) 12.5 mg, Oral, Daily at bedtime PRN    ferrous sulfate 324 mg, Oral, Daily before breakfast    ibuprofen (MOTRIN) 600 mg, Oral, Every 6 hours PRN    Prenatal Vit-Fe Fumarate-FA (PRENATAL PO) Oral    pyridoxine (B-6) 25 mg, Oral, Daily    witch hazel-glycerin (TUCKS) topical pad 1 Pad, Topical, Every 4 hours PRN         Pregnancy Problems (from 05/11/23 to present)       Problem Noted Resolved    24 weeks gestation of pregnancy 6/20/2023 by Dasha Elizalde MD No    Third trimester pregnancy 4/7/2021 by Duard Severe, CRNP No    Overview Addendum 8/14/2023  8:31 AM by Duard Severe, CRNP     Prenatal Panel normal  Recommended weight gain 25-35lbs  Declines NIPT  Desires MSAFP- negative  Hx HSV - on Valtrex 1000 mgs daily   8/14/23               1. Prenatal care in third trimester        2. 31 weeks gestation of pregnancy        3.  Candida vaginitis  DISCONTINUED: metroNIDAZOLE (METROGEL) 0.75 % gel        Yeny Torres MD  Family Medicine PGY-3  [facility] Cho

## 2023-10-28 ENCOUNTER — APPOINTMENT (OUTPATIENT)
Dept: LAB | Facility: HOSPITAL | Age: 28
End: 2023-10-28

## 2023-10-28 LAB — GLUCOSE 1H P 50 G GLC PO SERPL-MCNC: 137 MG/DL (ref 40–134)

## 2023-10-30 DIAGNOSIS — O99.810 ABNORMAL GLUCOSE AFFECTING PREGNANCY: Primary | ICD-10-CM

## 2023-11-07 ENCOUNTER — ROUTINE PRENATAL (OUTPATIENT)
Dept: OBGYN CLINIC | Facility: CLINIC | Age: 28
End: 2023-11-07

## 2023-11-07 ENCOUNTER — ULTRASOUND (OUTPATIENT)
Facility: HOSPITAL | Age: 28
End: 2023-11-07

## 2023-11-07 VITALS
WEIGHT: 132.6 LBS | SYSTOLIC BLOOD PRESSURE: 118 MMHG | HEIGHT: 57 IN | BODY MASS INDEX: 28.61 KG/M2 | DIASTOLIC BLOOD PRESSURE: 80 MMHG | HEART RATE: 72 BPM

## 2023-11-07 VITALS
SYSTOLIC BLOOD PRESSURE: 118 MMHG | WEIGHT: 132 LBS | DIASTOLIC BLOOD PRESSURE: 76 MMHG | HEART RATE: 62 BPM | BODY MASS INDEX: 28.56 KG/M2 | RESPIRATION RATE: 16 BRPM

## 2023-11-07 DIAGNOSIS — Z3A.32 32 WEEKS GESTATION OF PREGNANCY: ICD-10-CM

## 2023-11-07 DIAGNOSIS — O99.013 ANEMIA AFFECTING PREGNANCY IN THIRD TRIMESTER: Primary | ICD-10-CM

## 2023-11-07 DIAGNOSIS — O09.893 HISTORY OF PRETERM DELIVERY, CURRENTLY PREGNANT, THIRD TRIMESTER: Primary | ICD-10-CM

## 2023-11-07 DIAGNOSIS — Z36.4 ULTRASOUND FOR ANTENATAL SCREENING FOR FETAL GROWTH RESTRICTION: ICD-10-CM

## 2023-11-07 DIAGNOSIS — Z78.9 LANGUAGE BARRIER: ICD-10-CM

## 2023-11-07 PROCEDURE — 76816 OB US FOLLOW-UP PER FETUS: CPT | Performed by: OBSTETRICS & GYNECOLOGY

## 2023-11-07 PROCEDURE — 99213 OFFICE O/P EST LOW 20 MIN: CPT | Performed by: OBSTETRICS & GYNECOLOGY

## 2023-11-07 NOTE — PATIENT INSTRUCTIONS
Conteo de patadas en el embarazo   LO QUE NECESITA SABER:   ¿Qué necesito saber acerca del conteo de patadas? El conteo de patadas mide cuánto se está moviendo marshall bebé en el [place]. Arnold patada de marshall bebé podría sentirse aristeo arnold torcedura, arnold vuelta, un crujido, un meneo o un golpe. Es común sentir a tu bebé patear a las 32 a 29 semanas de Rima. Es posible que sienta al bebé patear ya a las 20 semanas de Rima. Puede que desee empezar a contar a las 28 semanas. ¿Por qué debería realizar el conteo de patadas? Los movimientos de marshall bebé podrían proporcionar información de la izabela de marshall bebé. Es posible que si hay problemas, marshall bebé se mueva menos o nada en lo absoluto. El bebé podría moverse menos si no recibe suficiente oxígeno o alimento de la placenta. No fume mientras está embarazada. Fumar disminuye la cantidad de oxígeno que llega a marshall bebé. Hable con marshall médico si necesita ayuda para dejar de fumar. Los problemas que se encuentran en arnold etapa más temprana son más fáciles de tratar. ¿Cuándo se realiza un conteo de patadas? Cuente las patadas en el mismo horario todos los días. Realice el conteo de las patadas cuando marshall bebé esté despierto y [place]. Marshall bebé podría estar más activo en la tarde. ¿Cómo se realiza un conteo de patadas? Revise que marshall bebé esté despierto antes de realizar el conteo de patadas. Usted puede despertar a marshall bebé empujando marshall estómago suavemente, caminando o tomando algo frío. Marshall médico podría indicarle diferentes maneras de realizar el conteo. Es posible que le indique que dimple lo siguiente:  Use arnold gráfica o un reloj para mantener un registro de la hora en que comienza y termina de contar. Siéntese en arnold silla o acuéstese en marshall costado derecho. Coloque cyrus andrés en la parte más augustina de marshall [place]. Cuente hasta que llegue a las 10 patadas. Escriba cuánto tiempo le lleva contar las 10 patadas.     Podría teri de 30 minutos a 2 horas para contar 10 patadas. No debería de teri más de 2 horas para contar 10 patadas. ¿Cuándo pablo comunicarme con mi médico?  Usted siente un cambio en el número de patadas o movimientos de marshall bebé. Siente menos de 10 patadas en 2 horas. Usted tiene preguntas o inquietudes acerca de los movimientos de marshall bebé. ACUERDOS SOBRE MARSHALL CUIDADO:   Usted tiene el derecho de ayudar a planear marshall cuidado. Aprenda todo lo que pueda sobre marshall condición y aristeo darle tratamiento. Discuta cyrus opciones de tratamiento con cyrus médicos para decidir el cuidado que usted desea recibir. Usted siempre tiene el derecho de rechazar el tratamiento. Esta información es sólo para uso en educación. Marshall intención no es darle un consejo médico sobre enfermedades o tratamientos. Colsulte con marshall Baby Greener farmacéutico antes de seguir cualquier régimen médico para saber si es seguro y efectivo para usted. © Copyright Kaley Alba 2023 Information is for End User's use only and may not be sold, redistributed or otherwise used for commercial purposes.

## 2023-11-07 NOTE — PROGRESS NOTES
[facility]  OB/GYN prenatal visit    S: 29 y.o. [id number] 32w6d here for PN visit. She has no obstetric complaints, including pelvic pain, contractions, vaginal bleeding, loss of fluid, or decreased fetal movement.      O:  Vitals:    23 1543   BP: 118/76   Pulse: 62   Resp: 16       Gen: no acute distress, nonlabored breathing  Fundal Height (cm): 32 cm  Fetal Heart Rate: 143    A/P:      IUP at 32w6d  No obstetric complaints today  TWG: + 19lbs 5.5 oz  ( recommended weight gain 25-35 lbs)  To complete her fasting 3-hour GTT  Anemia currently taking iron supplements  Vaccinations: had Tdap and flu, reviewed RSV vaccine risk and benefits and information provided in [place] follow-up at next appointment  Discussed  labor precautions and fetal kick counts    Return to clinic in 2 weeks    Problem List          Genitourinary    Herpes simplex infection of genitourinary system    Overview     Takes daily suppression at baseline, takes 1000 mgs daily              Other    Third trimester pregnancy    Overview     Prenatal Panel normal  Recommended weight gain 25-35lbs  Declines NIPT  Desires MSAFP- negative  1 hr , needs to complete 3 hr GTT  Hx HSV - on Valtrex 1000 mgs daily  US 23, VTX growth scan in 4 wks             History of  delivery, currently pregnant    Overview     Spontaneous  breech delivery 2021 at 32 weeks, 2 prior term vaginal deliveries in [facility] to vaginal progesterone and serial cervical surveillance, with cerclage if indicated         Nonimmune to hepatitis B virus    Overview     Noted on prenatal panel, can consider vaccine series through PCP         32 weeks gestation of pregnancy    Language barrier    Anemia affecting pregnancy in third trimester            SUSIE Gonzalez  2023  4:21 PM

## 2023-11-21 ENCOUNTER — ROUTINE PRENATAL (OUTPATIENT)
Dept: OBGYN CLINIC | Facility: CLINIC | Age: 28
End: 2023-11-21

## 2023-11-21 VITALS
DIASTOLIC BLOOD PRESSURE: 76 MMHG | HEART RATE: 70 BPM | SYSTOLIC BLOOD PRESSURE: 115 MMHG | BODY MASS INDEX: 33.54 KG/M2 | RESPIRATION RATE: 16 BRPM | WEIGHT: 155 LBS

## 2023-11-21 DIAGNOSIS — O99.013 ANEMIA AFFECTING PREGNANCY IN THIRD TRIMESTER: Primary | ICD-10-CM

## 2023-11-21 DIAGNOSIS — B37.31 VAGINAL YEAST INFECTION: ICD-10-CM

## 2023-11-21 DIAGNOSIS — N76.0 BACTERIAL VAGINOSIS: ICD-10-CM

## 2023-11-21 DIAGNOSIS — B96.89 BACTERIAL VAGINOSIS: ICD-10-CM

## 2023-11-21 DIAGNOSIS — Z3A.34 34 WEEKS GESTATION OF PREGNANCY: ICD-10-CM

## 2023-11-21 DIAGNOSIS — Z34.93 THIRD TRIMESTER PREGNANCY: ICD-10-CM

## 2023-11-21 DIAGNOSIS — Z78.9 LANGUAGE BARRIER: ICD-10-CM

## 2023-11-21 LAB
BV WHIFF TEST VAG QL: ABNORMAL
CLUE CELLS SPEC QL WET PREP: ABNORMAL
PH SMN: 5 [PH]
SL AMB POCT WET MOUNT: ABNORMAL
T VAGINALIS VAG QL WET PREP: ABNORMAL
YEAST VAG QL WET PREP: ABNORMAL

## 2023-11-21 RX ORDER — METRONIDAZOLE 500 MG/1
500 TABLET ORAL EVERY 12 HOURS SCHEDULED
Qty: 14 TABLET | Refills: 0 | Status: SHIPPED | OUTPATIENT
Start: 2023-11-21 | End: 2023-11-28

## 2023-11-21 NOTE — PROGRESS NOTES
[facility]  OB/GYN prenatal visit    S: 29 y.o. [id number] 34w6d here for PN visit.  [id number] used . she has no obstetric complaints, including pelvic pain, contractions, vaginal bleeding, loss of fluid, or decreased fetal movement. She reports she has vaginal  dsch, fishy odor  and itching     O:  Vitals:    23 1704   BP: 115/76   Pulse: 70   Resp: 16       Gen: no acute distress, nonlabored breathing  Fundal Height (cm): 34 cm  Fetal Heart Rate: 138  [place]OH positive for BV and yeast-numerous hyphae present  No HSV lesions seen. A/P:      IUP at 34w6d  Positive BV and yeast, will treat with metronidazole 500 mg 1 pill twice daily x 7 days and also miconazole 1 applicator vaginally x 7 nights  Reviewed the importance for patient to complete her fasting 3-hour GTT, she states she will go tomorrow  TWG: + verify weight next appt- 23 lb weight gain since last appt 2 wks ago? (Recommended weight gain in pregnancy is 25 to 35 pounds)Pt left before verification.   US is scheduled for 23  She reports she has been taking her Valtrex 1000 mgs daily  Vaccinations: Had Tdap and flu vaccine, RSV vaccine reviewed and information provided in [place]   Contraception: Desires Nexplanon  Breastfeeding: Plans to breast-feed and formula feed  Discussed  labor precautions and fetal kick counts    Return to clinic in 2 weeks    Problem List          Genitourinary    Herpes simplex infection of genitourinary system    Overview     Takes daily suppression at baseline, takes 1000 mgs daily           Bacterial vaginosis    Vaginal yeast infection       Other    Third trimester pregnancy    Overview     Prenatal Panel normal  Recommended weight gain 25-35lbs  Declines NIPT  Desires MSAFP- negative  1 hr , needs to complete 3 hr GTT  Hx HSV - on Valtrex 1000 mgs daily  US 23, VTX , AC 97%, growth scan in 4 wks she is scheduled 2023  Had Tdap and flu vaccine  ,information provided on RSV vaccine  Contraception-desires Nexplanon  Plans to breast-feed and formula feed             History of  delivery, currently pregnant    Overview     Spontaneous  breech delivery 2021 at 32 weeks, 2 prior term vaginal deliveries in [facility] to vaginal progesterone and serial cervical surveillance, with cerclage if indicated         Nonimmune to hepatitis B virus    Overview     Noted on prenatal panel, can consider vaccine series through PCP         34 weeks gestation of pregnancy    Language barrier    Anemia affecting pregnancy in third trimester          Bennetta Severe, CRNP  2023  5:10 PM

## 2023-12-04 ENCOUNTER — HOSPITAL ENCOUNTER (INPATIENT)
Facility: HOSPITAL | Age: 28
LOS: 1 days | Discharge: HOME | DRG: 955 | End: 2023-12-05
Attending: OBSTETRICS & GYNECOLOGY | Admitting: OBSTETRICS & GYNECOLOGY
Payer: COMMERCIAL

## 2023-12-04 LAB
ABO GROUP BLD: NORMAL
BASE EXCESS BLDCOA CALC-SCNC: -4.9 MMOL/L (ref 3–11)
BASE EXCESS BLDCOV CALC-SCNC: -1.4 MMOL/L (ref 1–9)
BLD GP AB SCN SERPL QL: NEGATIVE
ERYTHROCYTE [DISTWIDTH] IN BLOOD BY AUTOMATED COUNT: 17.4 % (ref 11.6–15.1)
HCO3 BLDCOA-SCNC: 21.8 MMOL/L (ref 17.3–27.3)
HCO3 BLDCOV-SCNC: 23.2 MMOL/L (ref 12.2–28.6)
HCT VFR BLD AUTO: 36.1 % (ref 34.8–46.1)
HGB BLD-MCNC: 11.2 G/DL (ref 11.5–15.4)
MCH RBC QN AUTO: 23.8 PG (ref 26.8–34.3)
MCHC RBC AUTO-ENTMCNC: 31 G/DL (ref 31.4–37.4)
MCV RBC AUTO: 77 FL (ref 82–98)
O2 CT VFR BLDCOA CALC: 13.6 ML/DL
OXYHGB MFR BLDCOA: 71.5 %
OXYHGB MFR BLDCOV: 73.4 %
PCO2 BLDCOA: 46 MM[HG] (ref 30–60)
PCO2 BLDCOV: 38.6 MM HG (ref 27–43)
PH BLDCOA: 7.29 [PH] (ref 7.23–7.43)
PH BLDCOV: 7.4 [PH] (ref 7.19–7.49)
PLATELET # BLD AUTO: 310 THOUSANDS/UL (ref 149–390)
PMV BLD AUTO: 10.1 FL (ref 8.9–12.7)
PO2 BLDCOA: 33.5 MM HG (ref 5–25)
PO2 BLDCOV: 30.7 MM HG (ref 15–45)
RBC # BLD AUTO: 4.7 MILLION/UL (ref 3.81–5.12)
RH BLD: POSITIVE
SAO2 % BLDCOV: 14 ML/DL
SPECIMEN EXPIRATION DATE: NORMAL
TREPONEMA PALLIDUM IGG+IGM AB [PRESENCE] IN SERUM OR PLASMA BY IMMUNOASSAY: NORMAL
WBC # BLD AUTO: 8.96 THOUSAND/UL (ref 4.31–10.16)

## 2023-12-04 PROCEDURE — 86780 TREPONEMA PALLIDUM: CPT | Performed by: OBSTETRICS & GYNECOLOGY

## 2023-12-04 PROCEDURE — 85027 COMPLETE CBC AUTOMATED: CPT | Performed by: OBSTETRICS & GYNECOLOGY

## 2023-12-04 PROCEDURE — NC001 PR NO CHARGE: Performed by: OBSTETRICS & GYNECOLOGY

## 2023-12-04 PROCEDURE — 4A1HXCZ MONITORING OF PRODUCTS OF CONCEPTION, CARDIAC RATE, EXTERNAL APPROACH: ICD-10-PCS | Performed by: OBSTETRICS & GYNECOLOGY

## 2023-12-04 PROCEDURE — 86901 BLOOD TYPING SEROLOGIC RH(D): CPT | Performed by: OBSTETRICS & GYNECOLOGY

## 2023-12-04 PROCEDURE — 99024 POSTOP FOLLOW-UP VISIT: CPT | Performed by: OBSTETRICS & GYNECOLOGY

## 2023-12-04 PROCEDURE — 86850 RBC ANTIBODY SCREEN: CPT | Performed by: OBSTETRICS & GYNECOLOGY

## 2023-12-04 PROCEDURE — 82805 BLOOD GASES W/O2 SATURATION: CPT | Performed by: OBSTETRICS & GYNECOLOGY

## 2023-12-04 PROCEDURE — 86900 BLOOD TYPING SEROLOGIC ABO: CPT | Performed by: OBSTETRICS & GYNECOLOGY

## 2023-12-04 RX ORDER — DIPHENHYDRAMINE HYDROCHLORIDE 50 MG/ML
25 INJECTION INTRAMUSCULAR; INTRAVENOUS EVERY 6 HOURS PRN
Status: DISCONTINUED | OUTPATIENT
Start: 2023-12-04 | End: 2023-12-05 | Stop reason: HOSPADM

## 2023-12-04 RX ORDER — DOCUSATE SODIUM 100 MG/1
100 CAPSULE, LIQUID FILLED ORAL 2 TIMES DAILY
Status: DISCONTINUED | OUTPATIENT
Start: 2023-12-04 | End: 2023-12-05 | Stop reason: HOSPADM

## 2023-12-04 RX ORDER — SIMETHICONE 80 MG
80 TABLET,CHEWABLE ORAL 4 TIMES DAILY PRN
Status: DISCONTINUED | OUTPATIENT
Start: 2023-12-04 | End: 2023-12-05 | Stop reason: HOSPADM

## 2023-12-04 RX ORDER — IBUPROFEN 600 MG/1
600 TABLET ORAL EVERY 6 HOURS PRN
Status: DISCONTINUED | OUTPATIENT
Start: 2023-12-04 | End: 2023-12-05 | Stop reason: HOSPADM

## 2023-12-04 RX ORDER — ONDANSETRON 2 MG/ML
4 INJECTION INTRAMUSCULAR; INTRAVENOUS EVERY 8 HOURS PRN
Status: DISCONTINUED | OUTPATIENT
Start: 2023-12-04 | End: 2023-12-05 | Stop reason: HOSPADM

## 2023-12-04 RX ORDER — SENNOSIDES 8.6 MG
1 TABLET ORAL DAILY
Status: DISCONTINUED | OUTPATIENT
Start: 2023-12-04 | End: 2023-12-05 | Stop reason: HOSPADM

## 2023-12-04 RX ORDER — CALCIUM CARBONATE 500 MG/1
1000 TABLET, CHEWABLE ORAL DAILY PRN
Status: DISCONTINUED | OUTPATIENT
Start: 2023-12-04 | End: 2023-12-05 | Stop reason: HOSPADM

## 2023-12-04 RX ORDER — ACETAMINOPHEN 325 MG/1
650 TABLET ORAL EVERY 4 HOURS PRN
Status: DISCONTINUED | OUTPATIENT
Start: 2023-12-04 | End: 2023-12-05 | Stop reason: HOSPADM

## 2023-12-04 RX ORDER — DIAPER,BRIEF,INFANT-TODD,DISP
1 EACH MISCELLANEOUS DAILY PRN
Status: DISCONTINUED | OUTPATIENT
Start: 2023-12-04 | End: 2023-12-05 | Stop reason: HOSPADM

## 2023-12-04 RX ORDER — OXYTOCIN/RINGER'S LACTATE 30/500 ML
250 PLASTIC BAG, INJECTION (ML) INTRAVENOUS ONCE
Status: COMPLETED | OUTPATIENT
Start: 2023-12-04 | End: 2023-12-04

## 2023-12-04 RX ADMIN — DOCUSATE SODIUM 100 MG: 100 CAPSULE, LIQUID FILLED ORAL at 17:25

## 2023-12-04 RX ADMIN — BENZOCAINE AND LEVOMENTHOL 1 APPLICATION: 200; 5 SPRAY TOPICAL at 17:25

## 2023-12-04 RX ADMIN — Medication 250 MILLI-UNITS/MIN: at 12:00

## 2023-12-04 RX ADMIN — HYDROCORTISONE 1 APPLICATION: 1 CREAM TOPICAL at 17:25

## 2023-12-04 NOTE — L&D DELIVERY NOTE
Vaginal Delivery Summary - OB/GYN   Fany Loredo 29 y.o. female MRN: [id number]  Unit/Bed#: -01 Encounter: [id number]      Pre-delivery Diagnosis:   1. Pregnancy at 36w  2. H/o HSV, no current outbreak  3. Anemia   4. Hep B non-immune    Post-delivery Diagnosis: same, delivered    Procedure: Spontaneous Vaginal Delivery     Attending: Kuldip Langley    Assistant(s): Delmis    Anesthesia: Epidural    EBL: minimal   QBL unable to be calculated due to rapid cervical change to complete and delivery     Complications: none apparent    Specimens:   1. Arterial and venous cord gases  2. Cord blood  3. Segment of umbilical cord  4. Placenta to storage     Findings:  1. Viable male on 2023 at 1134, with APGARS of 9 and 9 at 1 and 5 minutes respectively  2. Spontaneous delivery of intact placenta at 1138  3. No lacerations   4. Blood gases:   Arterial pH: 7.293   Arterial base excess: -4.9   Venous pH: 7.397   Venous base excess: -1.4    Disposition:  Patient tolerated the procedure well and was recovering in labor and delivery room       Brief history and labor course:  Ms. Radha Barton is a 29 y.o. [id number] at 36w5d. She presented to labor and delivery for contractions and was taken immediately to a labor room. Her pregnancy was complicated by anemia, non-immunity to hepatitis B, and a history of HSV. On exam on arrival she was noted to be 7/70/0 and make rapid change to complete. SROM shortly after arrival for meconium tinged fluid. She quickly progressed to complete and started pushing. Description of procedure:  After pushing for 1 minute, at 1134 patient delivered a viable male , wt pending, apgars of 9 (1 min) and 9 (5 min). The fetal vertex delivered spontaneously. There was no nuchal cord. The right anterior shoulder delivered atraumatically with maternal expulsive forces and the assistance of gentle downward traction.  The left posterior shoulder delivered with maternal expulsive forces and the assistance of gentle upward traction. The remainder of the fetus delivered spontaneously. Upon delivery, the infant was placed on the mothers abdomen and the cord was clamped and cut after delayed cord clamping. The infant was noted to cry spontaneously and was moving all extremities appropriately. There was no evidence for injury. Awaiting nurse resuscitators evaluated the . Arterial and venous cord blood gases and cord blood was collected for analysis. These were promptly sent to the lab. In the immediate post-partum, 30 units of IV pitocin was administered, and the uterus was noted to contract down well with massage and pitocin. The placenta delivered spontaneously at 1138 and was noted to have a centrally inserted 3 vessel cord. The vagina, cervix, perineum, and rectum were inspected and there was noted to be no laceration. Bimanual exam revealed minimal clots and good uterine tone. The fundus was firm and at the level of the umbilicus. At the conclusion of the procedure, all needle, sponge, and instrument counts were noted to be correct. Patient tolerated the procedure well and was allowed to recover in labor and delivery room with family and  before being transferred to the post-partum floor. Dr. Best Rolle was present and participated in all key portions of the case.       Jorge Luis Voss MD  2023  11:52 AM

## 2023-12-04 NOTE — H&P
[address] [facility] 29 y.o. female MRN: [id number]  Unit/Bed#: -01 Encounter: [id number]    Assessment: 29 y.o. [id number] at 36w5d admitted for labor. SVE: /0 with a taught bag -> rapid change to complete and then delivery   FHT: category I  Clinical EFW: 75%, cephalic confirmed on digital exam. Acuity and speed of change did now allow for TAUS  GBS status: unknown   Postpartum contraception: declined    Plan:     39 Weeks gestation  Admit to [facility]   Clear liquid diet   F/u T&S, CBC, RPR   IVF LR 125cc/hr   Continuous fetal monitoring and tocometry   Expectant management of normal labor with presentation of advanced cervical dilation    History of HSV  Not currently on suppression   No lesions present on presentation     Non-immune to hepatitis B  Consider vaccine series through PCP    Anemia affecting pregnancy   F/u admission CBC      Discussed case and plan w/ Dr. Charyl Dancer      Chief Complaint: contractions    HPI: Yajaira Ho is a 29 y.o. [id number] with an MALIA of 2023, by Ultrasound at 36w5d who is being admitted for labor with advanced cervical dilation. She complains of uterine contractions, occurring every 2-3 minutes, has no LOF, and reports no VB. She states she has felt good FM. She SROM for meconium tinged fluid shortly after arrival.     Patient Active Problem List   Diagnosis    Third trimester pregnancy    labor    Herpes simplex infection of genitourinary system    History of  delivery, currently pregnant    Nonimmune to hepatitis B virus    36 weeks gestation of pregnancy    Language barrier    Anemia affecting pregnancy in third trimester    Bacterial vaginosis    Vaginal yeast infection       Baby complications/comments: none    Review of Systems   Constitutional:  Negative for chills and fever. HENT:  Negative for congestion, rhinorrhea, sneezing and sore throat. Eyes:  Negative for photophobia and visual disturbance.    Respiratory: Negative for cough and shortness of breath. Cardiovascular:  Negative for chest pain. Gastrointestinal:  Negative for diarrhea, nausea and vomiting. Genitourinary:  Negative for dysuria and frequency. Neurological:  Negative for dizziness, numbness and headaches. Psychiatric/Behavioral: Negative. All other systems reviewed and are negative. OB Hx:  OB History    Para Term  AB Living   5 4 2 2 1 4   SAB IAB Ectopic Multiple Live Births   1     0 4      # Outcome Date GA Lbr Neal/2nd Weight Sex Delivery Anes PTL Lv   5  23 36w5d  2955 g (6 lb 8.2 oz) M Vag-Spont None Y KATH   4  21 32w3d / 00:07  F Vag-Breech None Y KATH   3 SAB 10/2018           2 Term 11 40w0d   F Vag-Spont None  KATH   1 Term 03/28/10    F Vag-Spont None  KATH       Past Medical Hx:  Past Medical History:   Diagnosis Date    Herpes     Latent    Patient denies medical problems        Past Surgical hx:  Past Surgical History:   Procedure Laterality Date    NO PAST SURGERIES           No Known Allergies    No medications prior to admission. Objective:  Temp:  [98.2 °F (36.8 °C)] 98.2 °F (36.8 °C)  HR:  [91] 91  Resp:  [16] 16  BP: (98)/(53) 98/53  Body mass index is 33.54 kg/m². Physical Exam:  Physical Exam  Constitutional:       General: She is not in acute distress. Appearance: Normal appearance. She is not ill-appearing. Genitourinary:      Genitourinary Comments: SVE as above   HENT:      Head: Normocephalic and atraumatic. Mouth/Throat:      Mouth: Mucous membranes are moist. No oral lesions. Eyes:      General: No scleral icterus. Extraocular Movements: Extraocular movements intact. Cardiovascular:      Rate and Rhythm: Normal rate. Pulmonary:      Effort: Pulmonary effort is normal.   Abdominal:      Comments: Gravid   Musculoskeletal:      Right lower leg: No edema. Left lower leg: No edema. Neurological:      General: No focal deficit present. Mental Status: She is alert. Skin:     General: Skin is warm and dry. Psychiatric:         Attention and Perception: Attention normal.         Mood and Affect: Mood normal.         Speech: Speech normal.         Behavior: Behavior normal.         Thought Content: Thought content normal.         Judgment: Judgment normal.   Vitals and nursing note reviewed. Exam conducted with a chaperone present.             FHT:  Category I      Lab Results   Component Value Date    WBC 8.96 12/04/2023    HGB 11.2 (L) 12/04/2023    HCT 36.1 12/04/2023     12/04/2023     Lab Results   Component Value Date    K 4.0 08/20/2021     08/20/2021    CO2 23 08/20/2021    BUN 6 08/20/2021    CREATININE 0.51 (L) 08/20/2021    AST 45 08/20/2021    ALT 35 08/20/2021     Prenatal Labs: Reviewed      Blood type: O pos  Antibody: neg  GBS: unknown  HIV: non-reactive  Rubella: immune   Syphilis IgM/IgG: non-reactive  HBsAg: non-reactive  HCAb: non-reactive  Chlamydia: negative   Gonorrhea: negative  Diabetes 1 hour screen: elevated 137  3 hour glucose: n/a  Platelets: admission CBC pending, previously 310  Hgb: admission CBC pending, previously 9.4  >2 Midnights  INPATIENT     Signature/Title: Dawood Garzon MD  Date: 12/6/2023  Time: 10:24 AM

## 2023-12-04 NOTE — DISCHARGE SUMMARY
Obstetrics Discharge Summary  Fany Burnett 29 y.o. female MRN: [id number]  Unit/Bed#: -01 Encounter: [id number]    Admission Date: 2023     Discharge Date: 2023    Admitting Attending: Karina Francisco  Delivery Attending: Karina Francisco  Discharging Attending: Cr Moss    Admitting Diagnoses:   Pregnancy at 36w   labor  HSV  Hepatitis B Nonimmune  Anemia affecting pregnancy    Discharge Diagnoses:   Same, delivered       Delivery  Route of Delivery: Vaginal, Spontaneous     Anesthesia: None ,   QBL: Non-Surgical QBL (mL): 0          Delivery: Vaginal, Spontaneous  at 2023  11:34 AM   Laceration: Perineal: None      Baby's Weight: 2955 g (6 lb 8.2 oz) ; 104.23     Apgar scores: 9  and 9  at 1 and 5 minutes, respectively      Hospital course: Ronnie Orourke is now a 29 y.o. M3V2202 who was initially admitted at 36w5d for  labor. She had a precipitous vaginal delivery. Her post-delivery course was uncomplicated. Her postpartum pain was well controlled with oral analgesics. Maternal blood type is O positive so RhoGAM was not indicated. On day of discharge, she was ambulating and able to reasonably perform all ADLs. She was voiding and had appropriate bowel function. Pain was well controlled. She was discharged home on postpartum day #1 without complications. Patient was instructed to follow up with her OBGYN as an outpatient and was given appropriate warnings to call provider if she develops signs of infection or uncontrolled pain. Complications: none apparent    Condition at discharge: good     Provisions for Follow-Up Care:  Please see after visit summary for information related to follow-up care and any pertinent home health orders. Disposition: Home    Planned Readmission: No    Discharge Medications:   Please see AVS for a complete list of discharge medications.     Discharge instructions :   Please see AVS for complete discharge instructions.     Gunnar Hackett MD  PGY-I, OB/GYN

## 2023-12-04 NOTE — PLAN OF CARE
Problem: POSTPARTUM  Goal: Experiences normal postpartum course  Description: INTERVENTIONS:  - Monitor maternal vital signs  - Assess uterine involution and lochia  Outcome: Progressing  Goal: Appropriate maternal -  bonding  Description: INTERVENTIONS:  - Identify family support  - Assess for appropriate maternal/infant bonding   -Encourage maternal/infant bonding opportunities  - Referral to  or  as needed  Outcome: Progressing  Goal: Establishment of infant feeding pattern  Description: INTERVENTIONS:  - Assess breast/bottle feeding  - Refer to lactation as needed  Outcome: Progressing     Problem: PAIN - ADULT  Goal: Verbalizes/displays adequate comfort level or baseline comfort level  Description: Interventions:  - Encourage patient to monitor pain and request assistance  - Assess pain using appropriate pain scale  - Administer analgesics based on type and severity of pain and evaluate response  - Implement non-pharmacological measures as appropriate and evaluate response  - Consider cultural and social influences on pain and pain management  - Notify physician/advanced practitioner if interventions unsuccessful or patient reports new pain  Outcome: Progressing     Problem: INFECTION - ADULT  Goal: Absence or prevention of progression during hospitalization  Description: INTERVENTIONS:  - Assess and monitor for signs and symptoms of infection  - Monitor lab/diagnostic results  - Monitor all insertion sites, i.e. indwelling lines, tubes, and drains  - Monitor endotracheal if appropriate and nasal secretions for changes in amount and color  - [place] appropriate cooling/warming therapies per order  - Administer medications as ordered  - Instruct and encourage patient and family to use good hand hygiene technique  - Identify and instruct in appropriate isolation precautions for identified infection/condition  Outcome: Progressing     Problem: SAFETY ADULT  Goal: Patient will remain free of falls  Description: INTERVENTIONS:  - Educate patient/family on patient safety including physical limitations  - Instruct patient to call for assistance with activity   - Consult OT/PT to assist with strengthening/mobility   - Keep Call bell within reach  - Keep bed low and locked with side rails adjusted as appropriate  - Keep care items and personal belongings within reach  - Initiate and maintain comfort rounds  Outcome: Progressing  Goal: Maintain or return to baseline ADL function  Description: INTERVENTIONS:  -  Assess patient's ability to carry out ADLs; assess patient's baseline for ADL function and identify physical deficits which impact ability to perform ADLs (bathing, care of mouth/teeth, toileting, grooming, dressing, etc.)  - Assess/evaluate cause of self-care deficits   - Assess range of motion  - Assess patient's mobility; develop plan if impaired  - Assess patient's need for assistive devices and provide as appropriate  - Encourage maximum independence but intervene and supervise when necessary  - Involve family in performance of ADLs  - Assess for home care needs following discharge   - Consider OT consult to assist with ADL evaluation and planning for discharge  - Provide patient education as appropriate  Outcome: Progressing  Goal: Maintains/Returns to pre admission functional level  Description: INTERVENTIONS:  - Perform AM-PAC 6 Click Basic Mobility/ Daily Activity assessment daily.  - Set and communicate daily mobility goal to care team and patient/family/caregiver. - Collaborate with rehabilitation services on mobility goals if consulted  Outcome: Progressing     Problem: Knowledge Deficit  Goal: Patient/family/caregiver demonstrates understanding of disease process, treatment plan, medications, and discharge instructions  Description: Complete learning assessment and assess knowledge base.   Interventions:  - Provide teaching at level of understanding  - Provide teaching via preferred learning methods  Outcome: Progressing     Problem: DISCHARGE PLANNING  Goal: Discharge to home or other facility with appropriate resources  Description: INTERVENTIONS:  - Identify barriers to discharge w/patient and caregiver  - Arrange for needed discharge resources and transportation as appropriate  - Identify discharge learning needs (meds, wound care, etc.)  - Arrange for interpretive services to assist at discharge as needed  - Refer to Case Management Department for coordinating discharge planning if the patient needs post-hospital services based on physician/advanced practitioner order or complex needs related to functional status, cognitive ability, or social support system  Outcome: Progressing

## 2023-12-05 VITALS
HEART RATE: 91 BPM | WEIGHT: 155 LBS | OXYGEN SATURATION: 99 % | HEIGHT: 57 IN | SYSTOLIC BLOOD PRESSURE: 98 MMHG | TEMPERATURE: 98.2 F | BODY MASS INDEX: 33.44 KG/M2 | DIASTOLIC BLOOD PRESSURE: 53 MMHG | RESPIRATION RATE: 16 BRPM

## 2023-12-05 PROCEDURE — 99024 POSTOP FOLLOW-UP VISIT: CPT | Performed by: OBSTETRICS & GYNECOLOGY

## 2023-12-05 RX ORDER — IBUPROFEN 600 MG/1
600 TABLET ORAL EVERY 6 HOURS PRN
Qty: 30 TABLET | Refills: 0 | Status: SHIPPED | OUTPATIENT
Start: 2023-12-05 | End: 2024-01-04

## 2023-12-05 RX ORDER — ACETAMINOPHEN 325 MG/1
650 TABLET ORAL EVERY 4 HOURS PRN
Refills: 0
Start: 2023-12-05

## 2023-12-05 RX ORDER — DOCUSATE SODIUM 100 MG/1
100 CAPSULE, LIQUID FILLED ORAL 2 TIMES DAILY
Refills: 0
Start: 2023-12-05

## 2023-12-05 NOTE — PLAN OF CARE
Problem: POSTPARTUM  Goal: Experiences normal postpartum course  Description: INTERVENTIONS:  - Monitor maternal vital signs  - Assess uterine involution and lochia  2023 by Mauricio Rodrigues RN  Outcome: Completed  2023 by Mauricio Rodrigues RN  Outcome: Adequate for Discharge  Goal: Appropriate maternal -  bonding  Description: INTERVENTIONS:  - Identify family support  - Assess for appropriate maternal/infant bonding   -Encourage maternal/infant bonding opportunities  - Referral to  or  as needed  2023 by Mauricio Rodrigues RN  Outcome: Completed  2023 by Mauricio Rodrigues RN  Outcome: Adequate for Discharge  Goal: Establishment of infant feeding pattern  Description: INTERVENTIONS:  - Assess breast/bottle feeding  - Refer to lactation as needed  2023 by Mauricio Rodrigues RN  Outcome: Completed  2023 by Mauricio Rodrigues RN  Outcome: Adequate for Discharge     Problem: PAIN - ADULT  Goal: Verbalizes/displays adequate comfort level or baseline comfort level  Description: Interventions:  - Encourage patient to monitor pain and request assistance  - Assess pain using appropriate pain scale  - Administer analgesics based on type and severity of pain and evaluate response  - Implement non-pharmacological measures as appropriate and evaluate response  - Consider cultural and social influences on pain and pain management  - Notify physician/advanced practitioner if interventions unsuccessful or patient reports new pain  2023 by Mauricio Rodrigues RN  Outcome: Completed  2023 by Mauricio Rodrigues RN  Outcome: Adequate for Discharge     Problem: INFECTION - ADULT  Goal: Absence or prevention of progression during hospitalization  Description: INTERVENTIONS:  - Assess and monitor for signs and symptoms of infection  - Monitor lab/diagnostic results  - Monitor all insertion sites, i.e. indwelling lines, tubes, and drains  - Monitor endotracheal if appropriate and nasal secretions for changes in amount and color  - [employer] [place] appropriate cooling/warming therapies per order  - Administer medications as ordered  - Instruct and encourage patient and family to use good hand hygiene technique  - Identify and instruct in appropriate isolation precautions for identified infection/condition  12/5/2023 1811 by Anne-Marie Hayden RN  Outcome: Completed  12/5/2023 1551 by Anne-Marie Hayden RN  Outcome: Adequate for Discharge     Problem: SAFETY ADULT  Goal: Patient will remain free of falls  Description: INTERVENTIONS:  - Educate patient/family on patient safety including physical limitations  - Instruct patient to call for assistance with activity   - Consult OT/PT to assist with strengthening/mobility   - Keep Call bell within reach  - Keep bed low and locked with side rails adjusted as appropriate  - Keep care items and personal belongings within reach  - Initiate and maintain comfort rounds  12/5/2023 1811 by Anne-Marie Hayden RN  Outcome: Completed  12/5/2023 1551 by Anne-Marie Hayden RN  Outcome: Adequate for Discharge  Goal: Maintain or return to baseline ADL function  Description: INTERVENTIONS:  -  Assess patient's ability to carry out ADLs; assess patient's baseline for ADL function and identify physical deficits which impact ability to perform ADLs (bathing, care of mouth/teeth, toileting, grooming, dressing, etc.)  - Assess/evaluate cause of self-care deficits   - Assess range of motion  - Assess patient's mobility; develop plan if impaired  - Assess patient's need for assistive devices and provide as appropriate  - Encourage maximum independence but intervene and supervise when necessary  - Involve family in performance of ADLs  - Assess for home care needs following discharge   - Consider OT consult to assist with ADL evaluation and planning for discharge  - Provide patient education as appropriate  12/5/2023 1811 by Jessica Tubbs Emanuel Mireles RN  Outcome: Completed  12/5/2023 1551 by Rupert Perez RN  Outcome: Adequate for Discharge  Goal: Maintains/Returns to pre admission functional level  Description: INTERVENTIONS:  - Perform AM-PAC 6 Click Basic Mobility/ Daily Activity assessment daily.  - Set and communicate daily mobility goal to care team and patient/family/caregiver. - Collaborate with rehabilitation services on mobility goals if consulted  12/5/2023 1811 by Rupert Perez RN  Outcome: Completed  12/5/2023 1551 by Rupert Perez RN  Outcome: Adequate for Discharge     Problem: Knowledge Deficit  Goal: Patient/family/caregiver demonstrates understanding of disease process, treatment plan, medications, and discharge instructions  Description: Complete learning assessment and assess knowledge base.   Interventions:  - Provide teaching at level of understanding  - Provide teaching via preferred learning methods  12/5/2023 1811 by Rupert Perez RN  Outcome: Completed  12/5/2023 1551 by Rupert Perez RN  Outcome: Adequate for Discharge     Problem: DISCHARGE PLANNING  Goal: Discharge to home or other facility with appropriate resources  Description: INTERVENTIONS:  - Identify barriers to discharge w/patient and caregiver  - Arrange for needed discharge resources and transportation as appropriate  - Identify discharge learning needs (meds, wound care, etc.)  - Arrange for interpretive services to assist at discharge as needed  - Refer to Case Management Department for coordinating discharge planning if the patient needs post-hospital services based on physician/advanced practitioner order or complex needs related to functional status, cognitive ability, or social support system  12/5/2023 1811 by Rupert Perez RN  Outcome: Completed  12/5/2023 1551 by Rupert Perez RN  Outcome: Adequate for Discharge     Problem: ALTERATION IN THE BREAST  Goal: Optimize infant feeding at the breast  Description: INTERVENTIONS:  - Latch, breast and nipple assessment  - Assess prior breast feeding history  - Hand expression of breast milk  - For cracked, bleeding and or sore nipples reassess latch, treat damaged nipple  -Educate mother on feeding cues  -Positioning/latch techniques  12/5/2023 1811 by July De La O RN  Outcome: Completed  12/5/2023 1551 by July De La O RN  Outcome: Adequate for Discharge     Problem: INADEQUATE LATCH, SUCK OR SWALLOW  Goal: Demonstrate ability to latch and sustain latch, audible swallowing and satiety  Description: INTERVENTIONS:  - Assess oral anatomy, notify Physician/AP for abnormal findings  - Establish milk expression  - Maximize feeding opportunity (skin to skin, behavioral state)  - Position/latch techniques  - Discourage use of pacifier-artificial nipple  - Mechanical pumping  - Nipple Shield  - Supplemental formula feeding (Physician/AP order)  - Alternative feeding method  12/5/2023 1811 by July De La O RN  Outcome: Completed  12/5/2023 1551 by July De La O RN  Outcome: Adequate for Discharge

## 2023-12-05 NOTE — PROGRESS NOTES
Progress Note - OB/GYN  Fany Kwan Ajtun 29 y.o. female MRN: [id number]  Unit/Bed#: -01 Encounter: [id number]    Assessment and Plan     Fany Gutierrez is a patient of: [address] [place] [address]. She is PPD# 1 s/p  spontaneous vaginal delivery  Recovering well and is stable       Anemia affecting pregnancy in third trimester  Assessment & Plan  CBC drawn immediately after delivery Hgb 11.2    Nonimmune to hepatitis B virus  Assessment & Plan  Consider vaccine series through PCP    Herpes simplex infection of genitourinary system  Assessment & Plan  Took suppression previously, but not taking at the time of delivery   No lesions visualized at the time of delivery     *  (spontaneous vaginal delivery)  Assessment & Plan  Lochia WNL   Recovering well   Appropriate bowel and bladder function   Pain well controlled   Tolerating diet   Breastfeeding   Ambulating without issues   No lower extremity tenderness  GBS unknown, no PCN received    Rh pos          Disposition    - Anticipate discharge home on PPD# 2      Subjective/Objective     Chief Complaint: Postpartum State     Subjective:    [address] is PPD#1 s/p  spontaneous vaginal delivery. She has no current complaints. Pain is well controlled. Patient is currently voiding. She is ambulating. Patient is currently passing flatus and has had no bowel movement. She is tolerating PO, and denies nausea or vomitting. Patient denies fever, chills, chest pain, shortness of breath, or calf tenderness. Lochia is normal. She is  Breastfeeding. She is recovering well and is stable.        Vitals:   BP 90/51 (BP Location: Right arm)   Pulse 60   Temp 98.2 °F (36.8 °C) (Oral)   Resp 20   Ht 4' 9" (1.448 m)   Wt 70.3 kg (155 lb)   LMP 2023 (Approximate)   SpO2 98%   Breastfeeding Yes   BMI 33.54 kg/m²       Intake/Output Summary (Last 24 hours) at 2023 0600  Last data filed at 2023 1500  Gross per 24 hour Intake --   Output 1400 ml   Net -1400 ml       Invasive Devices       Peripheral Intravenous Line  Duration             Peripheral IV 12/04/23 Left;Proximal;Ventral (anterior) Forearm <1 day                    Physical Exam:   GEN: Alyssa Arellano appears well, alert and oriented x 3, pleasant and cooperative   CARDIO: RRR, no murmurs or rubs  RESP:  CTAB, no wheezes or rales  ABDOMEN: soft, no tenderness, no distention, fundus firm U-3  EXTREMITIES: non tender, no erythema, b/l Abhinav's sign negative      Labs:     Hemoglobin   Date Value Ref Range Status   12/04/2023 11.2 (L) 11.5 - 15.4 g/dL Final   10/21/2023 9.4 (L) 11.5 - 15.4 g/dL Final     WBC   Date Value Ref Range Status   12/04/2023 8.96 4.31 - 10.16 Thousand/uL Final   10/21/2023 7.19 4.31 - 10.16 Thousand/uL Final     Platelets   Date Value Ref Range Status   12/04/2023 310 149 - 390 Thousands/uL Final   10/21/2023 310 149 - 390 Thousands/uL Final     Creatinine   Date Value Ref Range Status   08/20/2021 0.51 (L) 0.60 - 1.30 mg/dL Final     Comment:     Standardized to IDMS reference method   07/12/2021 0.49 (L) 0.60 - 1.30 mg/dL Final     Comment:     Standardized to IDMS reference method     AST   Date Value Ref Range Status   08/20/2021 45 5 - 45 U/L Final     Comment:     Specimen collection should occur prior to Sulfasalazine administration due to the potential for falsely depressed results. 07/12/2021 20 5 - 45 U/L Final     Comment:     Specimen collection should occur prior to Sulfasalazine administration due to the potential for falsely depressed results. ALT   Date Value Ref Range Status   08/20/2021 35 12 - 78 U/L Final     Comment:     Specimen collection should occur prior to Sulfasalazine administration due to the potential for falsely depressed results.     07/12/2021 16 12 - 78 U/L Final     Comment:     Specimen collection should occur prior to Sulfasalazine administration due to the potential for falsely depressed results.            Jeff Buckley MD  12/5/2023  6:00 AM

## 2023-12-05 NOTE — PLAN OF CARE
Problem: POSTPARTUM  Goal: Experiences normal postpartum course  Description: INTERVENTIONS:  - Monitor maternal vital signs  - Assess uterine involution and lochia  Outcome: Adequate for Discharge  Goal: Appropriate maternal -  bonding  Description: INTERVENTIONS:  - Identify family support  - Assess for appropriate maternal/infant bonding   -Encourage maternal/infant bonding opportunities  - Referral to  or  as needed  Outcome: Adequate for Discharge  Goal: Establishment of infant feeding pattern  Description: INTERVENTIONS:  - Assess breast/bottle feeding  - Refer to lactation as needed  Outcome: Adequate for Discharge     Problem: PAIN - ADULT  Goal: Verbalizes/displays adequate comfort level or baseline comfort level  Description: Interventions:  - Encourage patient to monitor pain and request assistance  - Assess pain using appropriate pain scale  - Administer analgesics based on type and severity of pain and evaluate response  - Implement non-pharmacological measures as appropriate and evaluate response  - Consider cultural and social influences on pain and pain management  - Notify physician/advanced practitioner if interventions unsuccessful or patient reports new pain  Outcome: Adequate for Discharge     Problem: INFECTION - ADULT  Goal: Absence or prevention of progression during hospitalization  Description: INTERVENTIONS:  - Assess and monitor for signs and symptoms of infection  - Monitor lab/diagnostic results  - Monitor all insertion sites, i.e. indwelling lines, tubes, and drains  - Monitor endotracheal if appropriate and nasal secretions for changes in amount and color  - [place] appropriate cooling/warming therapies per order  - Administer medications as ordered  - Instruct and encourage patient and family to use good hand hygiene technique  - Identify and instruct in appropriate isolation precautions for identified infection/condition  Outcome: Adequate for Discharge     Problem: SAFETY ADULT  Goal: Patient will remain free of falls  Description: INTERVENTIONS:  - Educate patient/family on patient safety including physical limitations  - Instruct patient to call for assistance with activity   - Consult OT/PT to assist with strengthening/mobility   - Keep Call bell within reach  - Keep bed low and locked with side rails adjusted as appropriate  - Keep care items and personal belongings within reach  - Initiate and maintain comfort rounds  Outcome: Adequate for Discharge  Goal: Maintain or return to baseline ADL function  Description: INTERVENTIONS:  -  Assess patient's ability to carry out ADLs; assess patient's baseline for ADL function and identify physical deficits which impact ability to perform ADLs (bathing, care of mouth/teeth, toileting, grooming, dressing, etc.)  - Assess/evaluate cause of self-care deficits   - Assess range of motion  - Assess patient's mobility; develop plan if impaired  - Assess patient's need for assistive devices and provide as appropriate  - Encourage maximum independence but intervene and supervise when necessary  - Involve family in performance of ADLs  - Assess for home care needs following discharge   - Consider OT consult to assist with ADL evaluation and planning for discharge  - Provide patient education as appropriate  Outcome: Adequate for Discharge  Goal: Maintains/Returns to pre admission functional level  Description: INTERVENTIONS:  - Perform AM-PAC 6 Click Basic Mobility/ Daily Activity assessment daily.  - Set and communicate daily mobility goal to care team and patient/family/caregiver.    - Collaborate with rehabilitation services on mobility goals if consulted  Outcome: Adequate for Discharge     Problem: Knowledge Deficit  Goal: Patient/family/caregiver demonstrates understanding of disease process, treatment plan, medications, and discharge instructions  Description: Complete learning assessment and assess knowledge base.  Interventions:  - Provide teaching at level of understanding  - Provide teaching via preferred learning methods  Outcome: Adequate for Discharge     Problem: DISCHARGE PLANNING  Goal: Discharge to home or other facility with appropriate resources  Description: INTERVENTIONS:  - Identify barriers to discharge w/patient and caregiver  - Arrange for needed discharge resources and transportation as appropriate  - Identify discharge learning needs (meds, wound care, etc.)  - Arrange for interpretive services to assist at discharge as needed  - Refer to Case Management Department for coordinating discharge planning if the patient needs post-hospital services based on physician/advanced practitioner order or complex needs related to functional status, cognitive ability, or social support system  Outcome: Adequate for Discharge     Problem: ALTERATION IN THE BREAST  Goal: Optimize infant feeding at the breast  Description: INTERVENTIONS:  - Latch, breast and nipple assessment  - Assess prior breast feeding history  - Hand expression of breast milk  - For cracked, bleeding and or sore nipples reassess latch, treat damaged nipple  -Educate mother on feeding cues  -Positioning/latch techniques  Outcome: Adequate for Discharge     Problem: INADEQUATE LATCH, SUCK OR SWALLOW  Goal: Demonstrate ability to latch and sustain latch, audible swallowing and satiety  Description: INTERVENTIONS:  - Assess oral anatomy, notify Physician/AP for abnormal findings  - Establish milk expression  - Maximize feeding opportunity (skin to skin, behavioral state)  - Position/latch techniques  - Discourage use of pacifier-artificial nipple  - Mechanical pumping  - Nipple Shield  - Supplemental formula feeding (Physician/AP order)  - Alternative feeding method  Outcome: Adequate for Discharge

## 2023-12-05 NOTE — ASSESSMENT & PLAN NOTE
Lochia WNL   Recovering well   Appropriate bowel and bladder function   Pain well controlled   Tolerating diet   Breastfeeding   Ambulating without issues   No lower extremity tenderness  GBS unknown, no PCN received    Rh pos

## 2023-12-05 NOTE — LACTATION NOTE
This note was copied from a baby's chart. CONSULT - LACTATION  Baby Boy (Fany) Aquiles Loredo 1 days male MRN: [id number]    [address] NURSERY Room / Bed: (N)/(N) Encounter: [id number]    Maternal Information     MOTHER:  Fany Rick  Maternal Age: 29 y.o.   OB History: # 1 - Date: [birth year], Sex: Female, Weight: None, GA: None, Delivery: Vaginal, Spontaneous, Apgar1: None, Apgar5: None, Living: Living, Birth Comments: None    # 2 - Date: 11, Sex: Female, Weight: None, GA: 40w0d, Delivery: Vaginal, Spontaneous, Apgar1: None, Apgar5: None, Living: Living, Birth Comments: None    # 3 - Date: 10/2018, Sex: None, Weight: None, GA: None, Delivery: None, Apgar1: None, Apgar5: None, Living: None, Birth Comments: None    # 4 - Date: 21, Sex: Female, Weight: None, GA: 32w3d, Delivery: Vaginal, Breech, Apgar1: None, Apgar5: None, Living: Living, Birth Comments: None    # 5 - Date: 23, Sex: Male, Weight: 2955 g (6 lb 8.2 oz), GA: 36w5d, Delivery: Vaginal, Spontaneous, Apgar1: 9, Apgar5: 9, Living: Living, Birth Comments: None   Previouse breast reduction surgery? No    Lactation history:   Has patient previously breast fed: Yes   How long had patient previously breast fed: Her 15 and 15 yo for 2 years. She states that her 1yo didn't latch and she formula fed. Previous breast feeding complications: Other (Comment) (latch issues with 1 yo.)     Past Surgical History:   Procedure Laterality Date    NO PAST SURGERIES          Birth information:  YOB: 2023   Time of birth: 11:34 AM   Sex: male   Delivery type: Vaginal, Spontaneous   Birth Weight: 2955 g (6 lb 8.2 oz)   Percent of Weight Change: 0%     Gestational Age: 44w11d   [unfilled]    Assessment     Breast and nipple assessment: normal assessment     Assessment: normal assessment    Feeding assessment:  not assessed  LATCH:  Latch:     Audible Swallowing:    Type of Nipple:    Comfort (Breast/Nipple):    Hold (Positioning):    LATCH Score:         Feeding recommendations:   Place baby at the breast every 2-3 hours and supplement with formula as needed until breast milk supply is established and baby is breastfeeding well. Met with Fany and provided her with the Ready Set Baby, Late  Infant and  Discharge Breastfeeding Booklets and reviewed information. All written and verbal information was provided in Wallisian. [employer] Una Caglebrandon #[id number] and Usman Rawls #[id number] were used for Wallisian translation. Information given and discussed on breastfeeding a late  infant. Discussed sleepiness, maintaining body temperature, the lack of stamina necessitating shorter feedings. Encouraged feeding every 2-3 hours around the clock followed by hand expressing/pumping. Discussed risks for early supplementation: over feeding, longer digestion times, engorgement for mom, lower milk supply for mom, and nipple confusion. Discussed Skin to Skin contact and benefits to mom and baby. Feeding cues and what that means for recognizing infant's hunger reviewed. Positioning and latch reviewed as well as showing images of other feeding positions. Discussed the properties of a good latch in any position. Reviewed hand/manual expression. Gave information on common concerns, what to expect the first few weeks after delivery, preparing for other caregivers, and how partners can help. Resources for support also provided. Also went over the feeding log. Emphasized 8 or more (12) feedings in a 24 hour period, what to expect for the number of diapers per day of life and the progression of properties of the  stooling pattern. Reviewed breastfeeding and your lifestyle, storage and preparation of breast milk, how to keep you breast pump clean and paced bottle feeding handouts.      Booklet included Breastfeeding Resources for after discharge including access to the number for the [address] [place] [address] & [facility] for follow up breastfeeding support as needed. Mom does not have medical insurance presently. A Hand Pump was provided and Fany was given phone number for [facility] to schedule an appointment , they may be able to help her obtain an electric pump. Encouraged mom to call if she has additional questions or for a latch assessment as needed. Phone Number provided.         Cristi Guzman RN 12/5/2023 5:43 PM

## 2023-12-05 NOTE — ASSESSMENT & PLAN NOTE
Took suppression previously, but not taking at the time of delivery   No lesions visualized at the time of delivery

## 2023-12-05 NOTE — PLAN OF CARE
Problem: POSTPARTUM  Goal: Experiences normal postpartum course  Description: INTERVENTIONS:  - Monitor maternal vital signs  - Assess uterine involution and lochia  Outcome: Progressing  Goal: Appropriate maternal -  bonding  Description: INTERVENTIONS:  - Identify family support  - Assess for appropriate maternal/infant bonding   -Encourage maternal/infant bonding opportunities  - Referral to  or  as needed  Outcome: Progressing  Goal: Establishment of infant feeding pattern  Description: INTERVENTIONS:  - Assess breast/bottle feeding  - Refer to lactation as needed  Outcome: Progressing     Problem: PAIN - ADULT  Goal: Verbalizes/displays adequate comfort level or baseline comfort level  Description: Interventions:  - Encourage patient to monitor pain and request assistance  - Assess pain using appropriate pain scale  - Administer analgesics based on type and severity of pain and evaluate response  - Implement non-pharmacological measures as appropriate and evaluate response  - Consider cultural and social influences on pain and pain management  - Notify physician/advanced practitioner if interventions unsuccessful or patient reports new pain  Outcome: Progressing     Problem: INFECTION - ADULT  Goal: Absence or prevention of progression during hospitalization  Description: INTERVENTIONS:  - Assess and monitor for signs and symptoms of infection  - Monitor lab/diagnostic results  - Monitor all insertion sites, i.e. indwelling lines, tubes, and drains  - Monitor endotracheal if appropriate and nasal secretions for changes in amount and color  - [place] appropriate cooling/warming therapies per order  - Administer medications as ordered  - Instruct and encourage patient and family to use good hand hygiene technique  - Identify and instruct in appropriate isolation precautions for identified infection/condition  Outcome: Progressing     Problem: SAFETY ADULT  Goal: Patient will remain free of falls  Description: INTERVENTIONS:  - Educate patient/family on patient safety including physical limitations  - Instruct patient to call for assistance with activity   - Consult OT/PT to assist with strengthening/mobility   - Keep Call bell within reach  - Keep bed low and locked with side rails adjusted as appropriate  - Keep care items and personal belongings within reach  - Initiate and maintain comfort rounds  Outcome: Progressing  Goal: Maintain or return to baseline ADL function  Description: INTERVENTIONS:  -  Assess patient's ability to carry out ADLs; assess patient's baseline for ADL function and identify physical deficits which impact ability to perform ADLs (bathing, care of mouth/teeth, toileting, grooming, dressing, etc.)  - Assess/evaluate cause of self-care deficits   - Assess range of motion  - Assess patient's mobility; develop plan if impaired  - Assess patient's need for assistive devices and provide as appropriate  - Encourage maximum independence but intervene and supervise when necessary  - Involve family in performance of ADLs  - Assess for home care needs following discharge   - Consider OT consult to assist with ADL evaluation and planning for discharge  - Provide patient education as appropriate  Outcome: Progressing  Goal: Maintains/Returns to pre admission functional level  Description: INTERVENTIONS:  - Perform AM-PAC 6 Click Basic Mobility/ Daily Activity assessment daily.  - Set and communicate daily mobility goal to care team and patient/family/caregiver. - Collaborate with rehabilitation services on mobility goals if consulted  Outcome: Progressing     Problem: Knowledge Deficit  Goal: Patient/family/caregiver demonstrates understanding of disease process, treatment plan, medications, and discharge instructions  Description: Complete learning assessment and assess knowledge base.   Interventions:  - Provide teaching at level of understanding  - Provide teaching via preferred learning methods  Outcome: Progressing     Problem: DISCHARGE PLANNING  Goal: Discharge to home or other facility with appropriate resources  Description: INTERVENTIONS:  - Identify barriers to discharge w/patient and caregiver  - Arrange for needed discharge resources and transportation as appropriate  - Identify discharge learning needs (meds, wound care, etc.)  - Arrange for interpretive services to assist at discharge as needed  - Refer to Case Management Department for coordinating discharge planning if the patient needs post-hospital services based on physician/advanced practitioner order or complex needs related to functional status, cognitive ability, or social support system  Outcome: Progressing

## 2023-12-10 LAB — PLACENTA IN STORAGE: NORMAL

## 2023-12-14 ENCOUNTER — TELEPHONE (OUTPATIENT)
Dept: OBGYN CLINIC | Facility: CLINIC | Age: 28
End: 2023-12-14

## 2023-12-14 NOTE — TELEPHONE ENCOUNTER
Transition of care done using Headplay  line    Transition of Care Post Partum phone call: Congratulations.     Date of delivery: 2023  Weeks gestation: pre-term at 36.5 weeks gestation  Type of delivery: vaginal delivery  Sex of child: male  Name of child:   Birth weight: 2955 gm   6lbs 8ounces  Perineum laceration: No   How are you feeling: good  Vaginal bleeding status: light  Urinary status: none  Bowel movement: Yes  Incision status: NA   Pain control: none  [place] feeding: both   Any baby blues: No  Scheduled for follow-up appointment: 2024

## 2024-01-02 ENCOUNTER — POSTPARTUM VISIT (OUTPATIENT)
Dept: OBGYN CLINIC | Facility: CLINIC | Age: 29
End: 2024-01-02

## 2024-01-02 VITALS
DIASTOLIC BLOOD PRESSURE: 64 MMHG | HEIGHT: 57 IN | HEART RATE: 52 BPM | SYSTOLIC BLOOD PRESSURE: 103 MMHG | WEIGHT: 116 LBS | BODY MASS INDEX: 25.03 KG/M2 | RESPIRATION RATE: 18 BRPM

## 2024-01-02 NOTE — PROGRESS NOTES
"[facility] OB/GYN Office Visit  Fany Loredo 28 y.o. female   MRN: [id number] : 1995  ENCOUNTER: 2024 3:36 AM    Assessment and Plan   Postpartum care and examination  No complaints since delivery  BP in office Blood Pressure: 103/64 appropriate  Examination WNL / No signs of post-partum complications  Follow up as needed      Chief Complaint     Chief Complaint   Patient presents with    Postpartum Care       History of Present Illness   Fany Loredo is a 28 y.o.-year-old female who presents today for post partum check up.  Denies any problems today.  Breast feeding without difficulty. Ocassionally having headaches but does not take anything for it.  Denies any conditions/symptoms today.     Review of Systems   Review of Systems   All other systems reviewed and are negative.      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    Objective   /64 (BP Location: Right arm, Patient Position: Sitting, Cuff Size: Adult)   Pulse (!) 52   Resp 18   Ht 4' 9\" (1.448 m)   Wt 52.6 kg (116 lb)   LMP 2023 (Approximate)   Breastfeeding Yes   BMI 25.10 kg/m²     Physical Exam  Vitals and nursing note reviewed.   Constitutional:       General: She is not in acute distress.     Appearance: Normal appearance.   HENT:      Head: Normocephalic and atraumatic.      Right Ear: External ear normal.      Left Ear: External ear normal.      Nose: " Dr. Madison aware and consulted per ER MD.   Nose normal.      Mouth/Throat:      Mouth: Mucous membranes are moist.      Pharynx: Oropharynx is clear. No oropharyngeal exudate.   Eyes:      General: No scleral icterus.        Right eye: No discharge.         Left eye: No discharge.      Extraocular Movements: Extraocular movements intact.      Pupils: Pupils are equal, round, and reactive to light.   Cardiovascular:      Rate and Rhythm: Normal rate and regular rhythm.      Pulses: Normal pulses.      Heart sounds: Normal heart sounds. No murmur heard.  Pulmonary:      Effort: Pulmonary effort is normal. No respiratory distress.      Breath sounds: Normal breath sounds. No stridor. No wheezing, rhonchi or rales.   Chest:      Chest wall: No tenderness.   Abdominal:      General: Abdomen is flat. Bowel sounds are normal.      Tenderness: There is no abdominal tenderness. There is no right CVA tenderness, left CVA tenderness or guarding.   Musculoskeletal:         General: Normal range of motion.      Cervical back: Normal range of motion and neck supple. No rigidity or tenderness.      Right lower leg: No edema.      Left lower leg: No edema.   Lymphadenopathy:      Cervical: No cervical adenopathy.   Skin:     General: Skin is warm and dry.      Coloration: Skin is not jaundiced.      Findings: No rash.   Neurological:      General: No focal deficit present.      Mental Status: She is alert and oriented to person, place, and time.      Cranial Nerves: No cranial nerve deficit.      Motor: No weakness.   Psychiatric:         Mood and Affect: Mood normal.         Behavior: Behavior normal.         Pertinent Laboratory/Diagnostic Studies:  Lab Results   Component Value Date    BUN 6 08/20/2021    CREATININE 0.51 (L) 08/20/2021    CALCIUM 8.6 08/20/2021    K 4.0 08/20/2021    CO2 23 08/20/2021     08/20/2021     Lab Results   Component Value Date    ALT 35 08/20/2021    AST 45 08/20/2021    ALKPHOS 404 (H) 08/20/2021       Lab Results   Component Value Date  "   WBC 8.96 12/04/2023    HGB 11.2 (L) 12/04/2023    HCT 36.1 12/04/2023    MCV 77 (L) 12/04/2023     12/04/2023       No results found for: \"TSH\"    No results found for: \"CHOL\"  No results found for: \"TRIG\"  No results found for: \"HDL\"  No results found for: \"LDLCALC\"  No results found for: \"HGBA1C\"    Results for orders placed or performed during the hospital encounter of 12/04/23   CORD, Blood gas, arterial   Result Value Ref Range    pH, Cord Art 7.293 7.230 - 7.430    pCO2, Cord Art 46.0 30.0 - 60.0    pO2, Cord Art 33.5 (H) 5.0 - 25.0 mm HG    HCO3, Cord Art 21.8 17.3 - 27.3 mmol/L    Base Exc, Cord Art -4.9 (L) 3.0 - 11.0 mmol/L    O2 Content, Cord Art 13.6 ml/dl    O2 Hgb, Arterial Cord 71.5 %   CORD, Blood gas, venous   Result Value Ref Range    pH, Cord Jacob 7.397 7.190 - 7.490    pCO2, Cord Jacob 38.6 27.0 - 43.0 mm HG    pO2, Cord Jacob 30.7 15.0 - 45.0 mm HG    HCO3, Cord Jacob 23.2 12.2 - 28.6 mmol/L    Base Exc, Cord Jacob -1.4 (L) 1.0 - 9.0 mmol/L    O2 Cont, Cord Jacob 14.0 mL/dL    O2 HGB,VENOUS CORD 73.4 %   Placenta in Storage   Result Value Ref Range    PLACENTA IN STORAGE Placenta Discarded    CBC and Platelet   Result Value Ref Range    WBC 8.96 4.31 - 10.16 Thousand/uL    RBC 4.70 3.81 - 5.12 Million/uL    Hemoglobin 11.2 (L) 11.5 - 15.4 g/dL    Hematocrit 36.1 34.8 - 46.1 %    MCV 77 (L) 82 - 98 fL    MCH 23.8 (L) 26.8 - 34.3 pg    MCHC 31.0 (L) 31.4 - 37.4 g/dL    RDW 17.4 (H) 11.6 - 15.1 %    Platelets 310 149 - 390 Thousands/uL    MPV 10.1 8.9 - 12.7 fL   RPR-Syphilis Screening (Total Syphilis IGG/IGM)   Result Value Ref Range    Syphilis Total Antibody Non-reactive Non-Reactive   Type and screen   Result Value Ref Range    ABO Grouping O     Rh Factor Positive     Antibody Screen Negative     Specimen Expiration Date 20231207        No orders of the defined types were placed in this encounter.        Current Medications     Current Outpatient Medications   Medication Sig Dispense Refill    " benzocaine-menthol-lanolin-aloe (DERMOPLAST) 20-0.5 % topical spray Apply 1 Application topically every 6 (six) hours as needed for mild pain or irritation  0    ferrous sulfate 324 (65 Fe) mg Take 1 tablet (324 mg total) by mouth daily before breakfast 90 tablet 3    witch hazel-glycerin (TUCKS) topical pad Apply 1 Pad topically every 4 (four) hours as needed for irritation  0    acetaminophen (TYLENOL) 325 mg tablet Take 2 tablets (650 mg total) by mouth every 4 (four) hours as needed for mild pain (Patient not taking: Reported on 1/2/2024)  0    docusate sodium (COLACE) 100 mg capsule Take 1 capsule (100 mg total) by mouth 2 (two) times a day (Patient not taking: Reported on 1/2/2024)  0    doxylamine (UNISOM) 25 MG tablet Take 0.5 tablets (12.5 mg total) by mouth daily at bedtime as needed for nausea (Patient not taking: Reported on 1/2/2024) 30 tablet 2    ibuprofen (MOTRIN) 600 mg tablet Take 1 tablet (600 mg total) by mouth every 6 (six) hours as needed for mild pain (Patient not taking: Reported on 1/2/2024) 30 tablet 0    Prenatal Vit-Fe Fumarate-FA (PRENATAL PO) Take by mouth (Patient not taking: Reported on 1/2/2024)      pyridoxine (B-6) 25 MG tablet Take 1 tablet (25 mg total) by mouth daily (Patient not taking: Reported on 8/14/2023) 60 tablet 2     No current facility-administered medications for this visit.       ALLERGIES:  No Known Allergies    Health Maintenance     Health Maintenance   Topic Date Due    COVID-19 Vaccine (1) Never done    Annual Physical  Never done    Cervical Cancer Screening  03/12/2024    Depression Screening  10/27/2024    DTaP,Tdap,and Td Vaccines (3 - Td or Tdap) 10/10/2033    Zoster Vaccine (1 of 2) 10/01/2045    HIV Screening  Completed    Hepatitis C Screening  Completed    Influenza Vaccine  Completed    Pneumococcal Vaccine: Pediatrics (0 to 5 Years) and At-Risk Patients (6 to 64 Years)  Aged Out    HIB Vaccine  Aged Out    IPV Vaccine  Aged Out    Hepatitis A Vaccine   Aged Out    Meningococcal ACWY Vaccine  Aged Out    HPV Vaccine  Aged Out    Chlamydia Screening  Discontinued     Immunization History   Administered Date(s) Administered    Influenza, injectable, quadrivalent, preservative free 0.5 mL 03/05/2021, 10/10/2023    Tdap 07/22/2021, 10/10/2023         DO iRckey Christianson [facility]      Parts of this note were dictated using MVocab dictation software and may have sounds-like errors due to variation in pronunciation.

## 2024-01-11 NOTE — ASSESSMENT & PLAN NOTE
No complaints since delivery  BP in office Blood Pressure: 103/64 appropriate  Examination WNL / No signs of post-partum complications  Follow up as needed
